# Patient Record
Sex: FEMALE | Race: WHITE | NOT HISPANIC OR LATINO | Employment: OTHER | URBAN - METROPOLITAN AREA
[De-identification: names, ages, dates, MRNs, and addresses within clinical notes are randomized per-mention and may not be internally consistent; named-entity substitution may affect disease eponyms.]

---

## 2017-02-27 DIAGNOSIS — G43.909 MIGRAINE, UNSPECIFIED, NOT INTRACTABLE, WITHOUT STATUS MIGRAINOSUS: ICD-10-CM

## 2017-02-28 NOTE — TELEPHONE ENCOUNTER
NIFEdipine      Last Written Prescription Date: 04/04/2016  Last Fill Quantity: 90, # refills: 3  Last Office Visit with G, [facility] or [facility] prescribing provider: 04/04/2016       BP Readings from Last 3 Encounters:   12/09/16 122/72   05/17/16 116/78   05/19/15 124/84

## 2017-03-01 RX ORDER — NIFEDIPINE 90 MG/1
TABLET, EXTENDED RELEASE ORAL
Qty: 90 TABLET | Refills: 0 | Status: SHIPPED | OUTPATIENT
Start: 2017-03-01 | End: 2017-03-20

## 2017-03-14 NOTE — PROGRESS NOTES
"  SUBJECTIVE:                                                    Christine Dubose is a 46 year old female who presents to clinic today for the following health issues:    Hypertension Follow-up      Outpatient blood pressures are not being checked.    Low Salt Diet: no added salt    Headaches: no    Edema: YES, by the end of the day       Depression and Anxiety Follow-Up    Status since last visit: No change    Other associated symptoms:None    Complicating factors:     Significant life event: No     Current substance abuse: None    Judaism makes her feel better    Activity: Walks    Side effects: no    Patient is flying tomorrow and would like something to help her sleep.     PHQ-9 SCORE 2/27/2015 5/17/2016   Total Score 9 -   Total Score - 5     JAYDE-7 SCORE 2/27/2015 5/17/2016   Total Score 0 -   Total Score - 0        PHQ-9  English      PHQ-9   Any Language     GAD7     Hypothyroidism Follow-up      Since last visit, patient describes the following symptoms: Weight gain- does not know if from the thyroid, no hair loss, no skin changes, no constipation, no loose stools       Amount of exercise or physical activity: walking    Problems taking medications regularly: No    Medication side effects: none    Diet: low salt    Problem list and histories reviewed & adjusted, as indicated.  Additional history: as documented    ROS:  Constitutional, HEENT, cardiovascular, pulmonary, GI, , musculoskeletal, neuro, skin, endocrine and psych systems are negative, except as otherwise noted.    This document serves as a record of the services and decisions personally performed and made by Prudencio Conde MD. It was created on his behalf by Mayelin Navarro, a trained medical scribe. The creation of this document is based the provider's statements to the medical scribe.  Mayelin Navarro    OBJECTIVE:                                                    /78  Pulse 83  Temp 98.2  F (36.8  C) (Oral)  Ht 1.689 m (5' 6.5\")  Wt 102.1 " kg (225 lb)  LMP 03/17/2014  SpO2 97%  BMI 35.77 kg/m2 Body mass index is 35.77 kg/(m^2).   GENERAL: healthy, alert, well nourished, well hydrated, no distress  EYES: Eyes grossly normal to inspection, extraocular movements - intact  RESP: lungs clear to auscultation - no rales, no rhonchi, no wheezes  CV: regular rates and rhythm, normal S1 S2, no S3 or S4 and no murmur, no click or rub -  MS: extremities- no gross deformities noted, no edema  SKIN: no suspicious lesions, no rashes  PSYCH: Alert and oriented times 3; speech- coherent , normal rate and volume; able to articulate logical thoughts, able to abstract reason, no tangential thoughts, no hallucinations or delusions, affect- normal  Diagnostic test results:  Results for orders placed or performed in visit on 03/20/17 (from the past 48 hour(s))   BASIC METABOLIC PANEL   Result Value Ref Range    Sodium 140 133 - 144 mmol/L    Potassium 3.9 3.4 - 5.3 mmol/L    Chloride 104 94 - 109 mmol/L    Carbon Dioxide 26 20 - 32 mmol/L    Anion Gap 10 3 - 14 mmol/L    Glucose 86 70 - 99 mg/dL    Urea Nitrogen 13 7 - 30 mg/dL    Creatinine 0.74 0.52 - 1.04 mg/dL    GFR Estimate 84 >60 mL/min/1.7m2    GFR Estimate If Black >90   GFR Calc   >60 mL/min/1.7m2    Calcium 9.3 8.5 - 10.1 mg/dL   Albumin Random Urine Quantitative   Result Value Ref Range    Creatinine Urine 33 mg/dL    Albumin Urine mg/L <5 mg/L    Albumin Urine mg/g Cr Unable to calculate due to low value 0 - 25 mg/g Cr          ASSESSMENT/PLAN:         Christine was seen today for recheck medication.    Diagnoses and all orders for this visit:    Hypothyroidism, unspecified type - controlled - continue medication and followed by Endocrine.    Posttraumatic stress disorder: - controlled - continue medication.  -     escitalopram (LEXAPRO) 20 MG tablet; Take 0.5-1 tablets (10-20 mg) by mouth every evening    Hypertension goal BP (blood pressure) < 140/90  -     BASIC METABOLIC PANEL  -     Albumin  "Random Urine Quantitative  -     Lipid panel reflex to direct LDL - FUTURE  S+90; Future  -     lisinopril (PRINIVIL/ZESTRIL) 5 MG tablet; Take 1 tablet (5 mg) by mouth daily    Migraine, unspecified, not intractable, without status migrainosus: - controlled - continue medication.  -     NIFEdipine ER osmotic (PROCARDIA XL) 90 MG 24 hr tablet; Take 1 tablet (90 mg) by mouth daily    Sleep disorder, circadian, jet lag type: Patient advised to start with half a dose and see if that helps her sleep.   -     diazepam (VALIUM) 5 MG tablet; Take 1 tablet (5 mg) by mouth every 6 hours as needed for anxiety or sleep    Risks, benefits and alternatives of treatments discussed. Plan agreed on.      Followup: 12 months    Will call, return to clinic, or go to ED if worsening or symptoms not improving as discussed.    See patient instructions.     BMI:   Estimated body mass index is 35.77 kg/(m^2) as calculated from the following:    Height as of this encounter: 1.689 m (5' 6.5\").    Weight as of this encounter: 102.1 kg (225 lb).   Weight management plan: Discussed healthy diet and exercise guidelines and patient will follow up in 6 months in clinic to re-evaluate.      Health Maintenance Topics with due status: Overdue       Topic Date Due    HPV Q5 YEARS (Complete with PAP) 04/08/2000    LIPID MONITORING Q1 YEAR( NO INBASKET) 02/03/2015    WELLNESS VISIT Q1 YR (NO INBASKET) 02/27/2016    BMP Q1 YR (NO INBASKET) 09/14/2016     Health Maintenance Topics with due status: Due Soon       Topic Date Due    MICROALBUMIN Q1 YEAR( NO INBASKET) 04/14/2017       Health maintenance reviewed/updated? Yes    The information in this document, created by the medical scribe for me, accurately reflects the services I personally performed and the decisions made by me. I have reviewed and approved this document for accuracy prior to leaving the patient care area.  Prudencio Conde MD March 20, 2017 7:52 AM        Sonny Conde MD   "

## 2017-03-20 ENCOUNTER — OFFICE VISIT (OUTPATIENT)
Dept: FAMILY MEDICINE | Facility: CLINIC | Age: 47
End: 2017-03-20
Payer: COMMERCIAL

## 2017-03-20 VITALS
SYSTOLIC BLOOD PRESSURE: 120 MMHG | HEIGHT: 67 IN | WEIGHT: 225 LBS | BODY MASS INDEX: 35.31 KG/M2 | HEART RATE: 83 BPM | OXYGEN SATURATION: 97 % | TEMPERATURE: 98.2 F | DIASTOLIC BLOOD PRESSURE: 78 MMHG

## 2017-03-20 DIAGNOSIS — I10 HYPERTENSION GOAL BP (BLOOD PRESSURE) < 140/90: ICD-10-CM

## 2017-03-20 DIAGNOSIS — F43.10 POSTTRAUMATIC STRESS DISORDER: ICD-10-CM

## 2017-03-20 DIAGNOSIS — G43.909 MIGRAINE, UNSPECIFIED, NOT INTRACTABLE, WITHOUT STATUS MIGRAINOSUS: ICD-10-CM

## 2017-03-20 DIAGNOSIS — G47.25 SLEEP DISORDER, CIRCADIAN, JET LAG TYPE: ICD-10-CM

## 2017-03-20 DIAGNOSIS — E03.9 HYPOTHYROIDISM, UNSPECIFIED TYPE: Primary | ICD-10-CM

## 2017-03-20 LAB
ANION GAP SERPL CALCULATED.3IONS-SCNC: 10 MMOL/L (ref 3–14)
BUN SERPL-MCNC: 13 MG/DL (ref 7–30)
CALCIUM SERPL-MCNC: 9.3 MG/DL (ref 8.5–10.1)
CHLORIDE SERPL-SCNC: 104 MMOL/L (ref 94–109)
CO2 SERPL-SCNC: 26 MMOL/L (ref 20–32)
CREAT SERPL-MCNC: 0.74 MG/DL (ref 0.52–1.04)
GFR SERPL CREATININE-BSD FRML MDRD: 84 ML/MIN/1.7M2
GLUCOSE SERPL-MCNC: 86 MG/DL (ref 70–99)
POTASSIUM SERPL-SCNC: 3.9 MMOL/L (ref 3.4–5.3)
SODIUM SERPL-SCNC: 140 MMOL/L (ref 133–144)

## 2017-03-20 PROCEDURE — 36415 COLL VENOUS BLD VENIPUNCTURE: CPT | Performed by: FAMILY MEDICINE

## 2017-03-20 PROCEDURE — 80048 BASIC METABOLIC PNL TOTAL CA: CPT | Performed by: FAMILY MEDICINE

## 2017-03-20 PROCEDURE — 82043 UR ALBUMIN QUANTITATIVE: CPT | Performed by: FAMILY MEDICINE

## 2017-03-20 PROCEDURE — 99214 OFFICE O/P EST MOD 30 MIN: CPT | Performed by: FAMILY MEDICINE

## 2017-03-20 RX ORDER — NIFEDIPINE 90 MG/1
90 TABLET, EXTENDED RELEASE ORAL DAILY
Qty: 90 TABLET | Refills: 3 | Status: SHIPPED | OUTPATIENT
Start: 2017-03-20 | End: 2018-04-30

## 2017-03-20 RX ORDER — ESCITALOPRAM OXALATE 20 MG/1
10-20 TABLET ORAL EVERY EVENING
Qty: 90 TABLET | Refills: 3 | Status: SHIPPED | OUTPATIENT
Start: 2017-03-20 | End: 2018-04-30

## 2017-03-20 RX ORDER — LEVOTHYROXINE SODIUM 125 UG/1
125 TABLET ORAL DAILY
Qty: 90 TABLET | Refills: 3 | Status: CANCELLED | OUTPATIENT
Start: 2017-03-20

## 2017-03-20 RX ORDER — LISINOPRIL 5 MG/1
5 TABLET ORAL DAILY
Qty: 90 TABLET | Refills: 3 | Status: SHIPPED | OUTPATIENT
Start: 2017-03-20 | End: 2018-04-30

## 2017-03-20 RX ORDER — DIAZEPAM 5 MG
5 TABLET ORAL EVERY 6 HOURS PRN
Qty: 2 TABLET | Refills: 0 | Status: SHIPPED | OUTPATIENT
Start: 2017-03-20 | End: 2017-06-23

## 2017-03-20 ASSESSMENT — ANXIETY QUESTIONNAIRES
6. BECOMING EASILY ANNOYED OR IRRITABLE: SEVERAL DAYS
IF YOU CHECKED OFF ANY PROBLEMS ON THIS QUESTIONNAIRE, HOW DIFFICULT HAVE THESE PROBLEMS MADE IT FOR YOU TO DO YOUR WORK, TAKE CARE OF THINGS AT HOME, OR GET ALONG WITH OTHER PEOPLE: NOT DIFFICULT AT ALL
GAD7 TOTAL SCORE: 1
7. FEELING AFRAID AS IF SOMETHING AWFUL MIGHT HAPPEN: NOT AT ALL
5. BEING SO RESTLESS THAT IT IS HARD TO SIT STILL: NOT AT ALL
1. FEELING NERVOUS, ANXIOUS, OR ON EDGE: NOT AT ALL
2. NOT BEING ABLE TO STOP OR CONTROL WORRYING: NOT AT ALL
3. WORRYING TOO MUCH ABOUT DIFFERENT THINGS: NOT AT ALL

## 2017-03-20 ASSESSMENT — PATIENT HEALTH QUESTIONNAIRE - PHQ9: 5. POOR APPETITE OR OVEREATING: NOT AT ALL

## 2017-03-20 NOTE — NURSING NOTE
"Chief Complaint   Patient presents with     Recheck Medication       Initial /78  Pulse 83  Temp 98.2  F (36.8  C) (Oral)  Ht 5' 6.5\" (1.689 m)  Wt 225 lb (102.1 kg)  LMP 03/17/2014  SpO2 97%  BMI 35.77 kg/m2 Estimated body mass index is 35.77 kg/(m^2) as calculated from the following:    Height as of this encounter: 5' 6.5\" (1.689 m).    Weight as of this encounter: 225 lb (102.1 kg).  Medication Reconciliation: complete  "

## 2017-03-20 NOTE — LETTER
[facility] PRIOR [address] [address]  [phone]        June 19, 2017    Christine Dubose  [address] [facility] [address]              Dear Christine Dubose    This is to remind you that your fasting lab work is due.    You may call our office at [phone] to schedule an appointment.    Please disregard this notice if you have already had your labs drawn or made an appointment.        Sincerely,        Prudencio Conde MD

## 2017-03-20 NOTE — MR AVS SNAPSHOT
After Visit Summary   3/20/2017    Christine Dubose    MRN: [id number]           Patient Information     Date Of Birth          1970        Visit Information        Provider Department      3/20/2017 2:40 PM Prudencio Conde MD [facility]        Today's Diagnoses     Hypothyroidism, unspecified type    -  1    Posttraumatic stress disorder        Hypertension goal BP (blood pressure) < 140/90        Migraine, unspecified, not intractable, without status migrainosus        Sleep disorder, circadian, jet lag type           Follow-ups after your visit        Your next 10 appointments already scheduled     May 30, 2017  1:15 PM CDT   MA SCREENING DIGITAL BILATERAL with WEMA1   [facility] ([facility])    [address] [place] [address], [address]  [facility] [id number] [phone]           Do not use any powder, lotion or deodorant under your arms or on your breast. If you do, we will ask you to remove it before your exam.  Wear comfortable, two-piece clothing.  If you have any allergies, tell your care team.  Bring any previous mammograms from other facilities or have them mailed to the breast center.            May 30, 2017  1:30 PM CDT   PHYSICAL with Christiana Andersen MD   [facility] ([facility])    [address] [facility]  [address]  [facility] [id number] [phone]              Future tests that were ordered for you today     Open Future Orders        Priority Expected Expires Ordered    Lipid panel reflex to direct LDL - FUTURE  S+90 Routine  6/12/2017 3/20/2017            Who to contact     If you have questions or need follow up information about today's clinic visit or your schedule please contact [facility] directly at [phone].  Normal or non-critical lab and imaging results will be communicated to you by MyChart, letter or phone within 4 business days after the clinic has  "received the results. If you do not hear from us within 7 days, please contact the clinic through [facility] or phone. If you have a critical or abnormal lab result, we will notify you by phone as soon as possible.  Submit refill requests through [facility] or call your pharmacy and they will forward the refill request to us. Please allow 3 business days for your refill to be completed.          Additional Information About Your Visit        [facility] Information     [facility] gives you secure access to your electronic health record. If you see a primary care provider, you can also send messages to your care team and make appointments. If you have questions, please call your primary care clinic.  If you do not have a primary care provider, please call [phone] and they will assist you.        Care EveryWhere ID     This is your Care EveryWhere ID. This could be used by other organizations to access your [facility] medical records  [id number]        Your Vitals Were     Pulse Temperature Height Last Period Pulse Oximetry BMI (Body Mass Index)    83 98.2  F (36.8  C) (Oral) 5' 6.5\" (1.689 m) 03/17/2014 97% 35.77 kg/m2       Blood Pressure from Last 3 Encounters:   03/20/17 120/78   12/09/16 122/72   05/17/16 116/78    Weight from Last 3 Encounters:   03/20/17 225 lb (102.1 kg)   12/09/16 222 lb (100.7 kg)   05/17/16 224 lb (101.6 kg)              We Performed the Following     Albumin Random Urine Quantitative     BASIC METABOLIC PANEL          Today's Medication Changes          These changes are accurate as of: 3/20/17  3:21 PM.  If you have any questions, ask your nurse or doctor.               Start taking these medicines.        Dose/Directions    diazepam 5 MG tablet   Commonly known as:  VALIUM   Used for:  Sleep disorder, circadian, jet lag type   Started by:  Prudencio Conde MD        Dose:  5 mg   Take 1 tablet (5 mg) by mouth every 6 hours as needed for anxiety or sleep   Quantity:  2 tablet   Refills:  0       "   These medicines have changed or have updated prescriptions.        Dose/Directions    NIFEdipine ER osmotic 90 MG 24 hr tablet   Commonly known as:  PROCARDIA XL   This may have changed:  See the new instructions.   Used for:  Migraine, unspecified, not intractable, without status migrainosus   Changed by:  Prudencio Conde MD        Dose:  90 mg   Take 1 tablet (90 mg) by mouth daily   Quantity:  90 tablet   Refills:  3            Where to get your medicines      These medications were sent to [place] [address] [place] [facility] - [facility] [address] [facility] [address]  [address] [facility] [address], [place] [address]     Phone:  [phone]     escitalopram 20 MG tablet    lisinopril 5 MG tablet    NIFEdipine ER osmotic 90 MG 24 hr tablet         Some of these will need a paper prescription and others can be bought over the counter.  Ask your nurse if you have questions.     Bring a paper prescription for each of these medications     diazepam 5 MG tablet                Primary Care Provider Office Phone # Fax #    Prudencio Conde [phone]       [address] [address]        Thank you!     Thank you for choosing [facility]  for your care. Our goal is always to provide you with excellent care. Hearing back from our patients is one way we can continue to improve our services. Please take a few minutes to complete the written survey that you may receive in the mail after your visit with us. Thank you!             Your Updated Medication List - Protect others around you: Learn how to safely use, store and throw away your medicines at www.disposemymeds.org.          This list is accurate as of: 3/20/17  3:21 PM.  Always use your most recent med list.                   Brand Name Dispense Instructions for use    Calcium-Magnesium-Zinc 333-133-5 MG Tabs      Take 1 tablet by mouth At Bedtime       diazepam 5 MG tablet    VALIUM    2 tablet    Take 1 tablet (5 mg)  by mouth every 6 hours as needed for anxiety or sleep       escitalopram 20 MG tablet    LEXAPRO    90 tablet    Take 0.5-1 tablets (10-20 mg) by mouth every evening       KRILL OIL PO      Take 1 capsule by mouth every evening       levothyroxine 125 MCG tablet    SYNTHROID/LEVOTHROID    90 tablet    Take 1 tablet (125 mcg) by mouth daily Take 1 Tablet daily       lisinopril 5 MG tablet    PRINIVIL/ZESTRIL    90 tablet    Take 1 tablet (5 mg) by mouth daily       MULTIVITAMIN GUMMIES CHILDRENS PO      Take 2 chew tab by mouth daily as needed       NIFEdipine ER osmotic 90 MG 24 hr tablet    PROCARDIA XL    90 tablet    Take 1 tablet (90 mg) by mouth daily       PATANOL 0.1 % ophthalmic solution   Generic drug:  olopatadine      Place 1 drop into both eyes daily as needed for allergies       VITAMIN B-1 PO          VITAMIN D3 PO      Take by mouth daily

## 2017-03-21 LAB
CREAT UR-MCNC: 33 MG/DL
MICROALBUMIN UR-MCNC: <5 MG/L
MICROALBUMIN/CREAT UR: NORMAL MG/G CR (ref 0–25)

## 2017-03-21 ASSESSMENT — PATIENT HEALTH QUESTIONNAIRE - PHQ9: SUM OF ALL RESPONSES TO PHQ QUESTIONS 1-9: 6

## 2017-03-21 ASSESSMENT — ANXIETY QUESTIONNAIRES: GAD7 TOTAL SCORE: 1

## 2017-05-27 DIAGNOSIS — G43.909 MIGRAINE, UNSPECIFIED, NOT INTRACTABLE, WITHOUT STATUS MIGRAINOSUS: ICD-10-CM

## 2017-05-30 ENCOUNTER — RADIANT APPOINTMENT (OUTPATIENT)
Dept: MAMMOGRAPHY | Facility: CLINIC | Age: 47
End: 2017-05-30
Payer: COMMERCIAL

## 2017-05-30 DIAGNOSIS — Z12.31 VISIT FOR SCREENING MAMMOGRAM: ICD-10-CM

## 2017-05-30 PROCEDURE — 77063 BREAST TOMOSYNTHESIS BI: CPT | Mod: TC

## 2017-05-30 PROCEDURE — G0202 SCR MAMMO BI INCL CAD: HCPCS | Mod: TC

## 2017-05-30 NOTE — TELEPHONE ENCOUNTER
NIFEdipine ER osmotic (PROCARDIA XL) 90 MG 24 hr tablet      This refill has been requested too soon.  Please decline and close.

## 2017-05-31 RX ORDER — NIFEDIPINE 90 MG/1
TABLET, EXTENDED RELEASE ORAL
Qty: 90 TABLET | Refills: 0 | OUTPATIENT
Start: 2017-05-31

## 2017-06-23 ENCOUNTER — OFFICE VISIT (OUTPATIENT)
Dept: OBGYN | Facility: CLINIC | Age: 47
End: 2017-06-23
Payer: COMMERCIAL

## 2017-06-23 VITALS
HEIGHT: 66 IN | DIASTOLIC BLOOD PRESSURE: 78 MMHG | WEIGHT: 228 LBS | SYSTOLIC BLOOD PRESSURE: 122 MMHG | BODY MASS INDEX: 36.64 KG/M2

## 2017-06-23 DIAGNOSIS — Z01.419 ENCOUNTER FOR GYNECOLOGICAL EXAMINATION WITHOUT ABNORMAL FINDING: Primary | ICD-10-CM

## 2017-06-23 DIAGNOSIS — E03.9 HYPOTHYROIDISM, UNSPECIFIED TYPE: ICD-10-CM

## 2017-06-23 PROCEDURE — 87624 HPV HI-RISK TYP POOLED RSLT: CPT | Performed by: OBSTETRICS & GYNECOLOGY

## 2017-06-23 PROCEDURE — 99396 PREV VISIT EST AGE 40-64: CPT | Performed by: OBSTETRICS & GYNECOLOGY

## 2017-06-23 PROCEDURE — G0145 SCR C/V CYTO,THINLAYER,RESCR: HCPCS | Performed by: OBSTETRICS & GYNECOLOGY

## 2017-06-23 RX ORDER — LEVOTHYROXINE SODIUM 137 UG/1
TABLET ORAL
Refills: 3 | COMMUNITY
Start: 2017-04-27 | End: 2018-04-30

## 2017-06-23 ASSESSMENT — ANXIETY QUESTIONNAIRES
IF YOU CHECKED OFF ANY PROBLEMS ON THIS QUESTIONNAIRE, HOW DIFFICULT HAVE THESE PROBLEMS MADE IT FOR YOU TO DO YOUR WORK, TAKE CARE OF THINGS AT HOME, OR GET ALONG WITH OTHER PEOPLE: NOT DIFFICULT AT ALL
6. BECOMING EASILY ANNOYED OR IRRITABLE: NOT AT ALL
2. NOT BEING ABLE TO STOP OR CONTROL WORRYING: NOT AT ALL
7. FEELING AFRAID AS IF SOMETHING AWFUL MIGHT HAPPEN: NOT AT ALL
1. FEELING NERVOUS, ANXIOUS, OR ON EDGE: NOT AT ALL
3. WORRYING TOO MUCH ABOUT DIFFERENT THINGS: NOT AT ALL
5. BEING SO RESTLESS THAT IT IS HARD TO SIT STILL: NOT AT ALL
GAD7 TOTAL SCORE: 0

## 2017-06-23 ASSESSMENT — PATIENT HEALTH QUESTIONNAIRE - PHQ9: 5. POOR APPETITE OR OVEREATING: NOT AT ALL

## 2017-06-23 NOTE — PROGRESS NOTES
Christine is a 47 year old  female who presents for annual exam.     Besides routine health maintenance, she has no other health concerns today .    HPI:  Here today for yearly exam --doing well.  S/p LASH in  with [facility].  No vb/spotting.  Has also had anterior repair with Dr. Kurtz in .  Denies hot flushes or night sweats.  +SA --no issues.  Denies dryness or pain.  Has had hemorrhoid since travel in march --denies signficant constipation.  Improving but will monitor.  No bleeding.  No bladder issues --getting up 1-2x/night but manageable at this time.  No leaking with coughing/sneezing/laughing.    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 Ayala --increased thyroid meds this spring  Hx depression/anxiety/PTSD --stable on lexapro with PCP         GYNECOLOGIC HISTORY:    Patient's last menstrual period was 2014.  Her current contraception method is: hysterectomy.  She  reports that she has never smoked. She has never used smokeless tobacco.    Patient is sexually active.  STD testing offered?  Declined  Last PHQ-9 score on record =   PHQ-9 SCORE 2017   Total Score -   Total Score 6     Last GAD7 score on record =   JAYDE-7 SCORE 2017   Total Score -   Total Score 0     Alcohol Score = 1    HEALTH MAINTENANCE:  Cholesterol:   Cholesterol   Date Value Ref Range Status   2014 202 (H) 0 - 200 mg/dL Final     Comment:     LDL Cholesterol is the primary guide to therapy.   The NCEP recommends further evaluation of: patients with cholesterol greater   than 200 mg/dL if additional risk factors are present, cholesterol greater   than   240 mg/dL, triglycerides greater than 150 mg/dL, or HDL less than 40 mg/dL.    2012 188 0 - 200 mg/dL Final     Comment:     LDL Cholesterol is the primary guide to therapy.   The NCEP recommends further evaluation of: patients with cholesterol greater   than 200 mg/dL if additional risk factors are present, cholesterol greater   than   240 mg/dL, triglycerides greater than 150 mg/dL, or HDL less than 40 mg/dL.   Last Mammo: 2017, Result: normal, Next Mammo: 2018  Pap: 2013 - WNL  Colonoscopy:  Never, Result: not applicable, Next Colonoscopy: Age 50  Dexa: Never    Health maintenance updated:  yes    HISTORY:  Obstetric History       T3      L4     SAB0   TAB0   Ectopic0   Multiple0   Live Births4       # Outcome Date GA Lbr Amol/2nd Weight Sex Delivery Anes PTL Lv   5 Term 07 37w0d  6 lb 14 oz (3.118 kg) M    SHAN      Name: Garrick   4 Term 02 37w0d  6 lb 6 oz (2.892 kg) M    SHAN      Name: Tone   3 Term 99 40w0d  8 lb 3 oz (3.714 kg) F    SHAN   2  97 35w0d   M -SEC   SHAN      Name: Giancarlo Vizcarra SAB                   Patient Active Problem List   Diagnosis     CARDIOVASCULAR SCREENING; LDL GOAL LESS THAN 160     Hypertension goal BP (blood pressure) < 140/90     Migraine     Osteoarthritis     Knee joint replacement by other means     Abnormal gait     Low back pain     Synovial cyst     History of arthroplasty of left knee     Posttraumatic stress disorder     Hypothyroidism, unspecified type     Past Surgical History:   Procedure Laterality Date     ARTHROPLASTY KNEE UNICOMPARTMENT Right 2015    Procedure: ARTHROPLASTY KNEE UNICOMPARTMENT;  Surgeon: Pablo Lara MD;  Location:  OR     ARTHROPLASTY KNEE UNICOMPARTMENT Left 10/27/2015    Procedure: ARTHROPLASTY KNEE UNICOMPARTMENT;  Surgeon: Pablo Lara MD;  Location:  OR     BACK SURGERY      CERVICAL SURGERY  (injections)      SECTION  1997    for breech     CYCTOCELE REPAIR       CYSTOSCOPY  2014    Procedure:  CYSTOSCOPY;;  Surgeon: Ad Crandall MD;  Location: [facility]     DECOMPRESSION LUMBAR ONE LEVEL Right 9/16/2015    Procedure: DECOMPRESSION LUMBAR ONE LEVEL;  Surgeon: Elio Starr MD;  Location:  OR     DILATION AND CURETTAGE, OPERATIVE HYSTEROSCOPY, COMBINED  3/8/2012    Procedure:COMBINED DILATION AND CURETTAGE, OPERATIVE HYSTEROSCOPY; OPERATIVE HYSTEROSCOPY, DILATION AND CURETTAGE, POSSIBLE RESECTION OF POLYP; Surgeon:AD CRANDALL; Location:[facility]     ENT SURGERY      TONSILLECTOMY     GENITOURINARY SURGERY      BLADDER SLING     LAPAROSCOPIC HYSTERECTOMY SUPRACERVICAL  4/9/2014    Procedure: LAPAROSCOPIC HYSTERECTOMY SUPRACERVICAL;  LAPAROSCOPIC ASSISTED SUPRACERVICAL HYSTERECTOMY, cystoscopy;  Surgeon: Ad Crandall MD;  Location: [facility]     STRIP VEIN       STRIP VEIN  11/2010     TONSILLECTOMY  1974      Social History   Substance Use Topics     Smoking status: Never Smoker     Smokeless tobacco: Never Used     Alcohol use 0.0 oz/week     0 Standard drinks or equivalent per week      Comment: social      Problem (# of Occurrences) Relation (Name,Age of Onset)    CEREBROVASCULAR DISEASE (1) Paternal Grandmother: TIA    DIABETES (4) Maternal Grandmother, Maternal Grandfather, Paternal Grandmother, Paternal Grandfather    Fractures (1) Maternal Grandmother: Hip    Hyperlipidemia (2) Mother: high triglycerides, Father: not on meds    Hypertension (4) Mother, Father, Maternal Grandmother, Paternal Grandmother    Lung Cancer (1) Maternal Grandfather (68): smoker    OSTEOPOROSIS (1) Maternal Grandmother    Stomach Cancer (1) Paternal Grandfather (68)    Thyroid Disease (1) Mother       Negative family history of: Coronary Artery Disease, Breast Cancer, Cancer - colorectal            Current Outpatient Prescriptions   Medication Sig     levothyroxine (SYNTHROID/LEVOTHROID) 137 MCG tablet TAKE ONE TABLET BY MOUTH ONCE DAILY.     escitalopram (LEXAPRO) 20 MG tablet Take  "0.5-1 tablets (10-20 mg) by mouth every evening     lisinopril (PRINIVIL/ZESTRIL) 5 MG tablet Take 1 tablet (5 mg) by mouth daily     NIFEdipine ER osmotic (PROCARDIA XL) 90 MG 24 hr tablet Take 1 tablet (90 mg) by mouth daily     Thiamine HCl (VITAMIN B-1 PO)      Pediatric Multivit-Minerals-C (MULTIVITAMIN GUMMIES CHILDRENS PO) Take 2 chew tab by mouth daily as needed     Calcium-Magnesium-Zinc 333-133-5 MG TABS Take 1 tablet by mouth At Bedtime     KRILL OIL PO Take 1 capsule by mouth every evening      PATANOL 0.1 % ophthalmic solution Place 1 drop into both eyes daily as needed for allergies      No current facility-administered medications for this visit.      Allergies   Allergen Reactions     Imitrex [Sumatriptan] Other (See Comments)     Throat tightness       Past medical, surgical, social and family histories were reviewed and updated in EPIC.    ROS:   12 point review of systems negative other than symptoms noted below.  Constitutional: Fatigue and Weight Gain  Cardiovascular: Lower Extremity Swelling  Gastrointestinal: Hemorrhoids     EXAM:  /78  Ht 5' 6\" (1.676 m)  Wt 228 lb (103.4 kg)  LMP 03/17/2014  Breastfeeding? No  BMI 36.8 kg/m2   BMI: Body mass index is 36.8 kg/(m^2).    PHYSICAL EXAM:  Constitutional:  Appearance: Well nourished, well developed, alert, in no acute distress  Neck:  Lymph Nodes:  No lymphadenopathy present    Thyroid:  Gland size normal, nontender, no nodules or masses present  on palpation  Chest:  Respiratory Effort:  Breathing unlabored  Cardiovascular:    Heart: Auscultation:  Regular rate, normal rhythm, no murmurs present  Breasts: Inspection of Breasts:  No lymphadenopathy present    Palpation of Breasts and Axillae:  No masses present on palpation, no  breast tenderness    Axillary Lymph Nodes:  No lymphadenopathy present  Gastrointestinal:   Abdominal Examination:  Abdomen nontender to palpation, tone normal without rigidity or guarding, no masses present, " umbilicus without lesions   Liver and Spleen:  No hepatomegaly present, liver nontender to palpation    Hernias:  No hernias present  Lymphatic: Lymph Nodes:  No other lymphadenopathy present  Skin:  General Inspection:  No rashes present, no lesions present, no areas of  discoloration    Genitalia and Groin:  No rashes present, no lesions present, no areas of  discoloration, no masses present  Neurologic/Psychiatric:    Mental Status:  Oriented X3     Pelvic Exam:  External Genitalia:     Normal appearance for age, no discharge present, no tenderness present, no inflammatory lesions present, color normal  Vagina:     Normal vaginal vault without central or paravaginal defects, no discharge present, no inflammatory lesions present, no masses present  Bladder:     Nontender to palpation  Urethra:   Urethral Body:  Urethra palpation normal, urethra structural support normal   Urethral Meatus:  No erythema or lesions present  Cervix:     Appearance healthy, no lesions present, nontender to palpation, no bleeding present  Uterus:     Surgically absent  Adnexa:     No adnexal tenderness present, no adnexal masses present  Perineum:     Perineum within normal limits, no evidence of trauma, no rashes or skin lesions present  Anus:     Anus within normal limits, no hemorrhoids present  Inguinal Lymph Nodes:     No lymphadenopathy present  Pubic Hair:     Normal pubic hair distribution for age  Genitalia and Groin:     No rashes present, no lesions present, no areas of discoloration, no masses present    COUNSELING:   Reviewed preventive health counseling, as reflected in patient instructions  Special attention given to:        Regular exercise       Healthy diet/nutrition       (Amanda)menopause management    BMI: Body mass index is 36.8 kg/(m^2).  Weight management plan: Discussed healthy diet and exercise guidelines and patient will follow up in 12 months in clinic to re-evaluate.    ASSESSMENT:  47 year old female with  satisfactory annual exam.    ICD-10-CM    1. Encounter for gynecological examination without abnormal finding Z01.419 Pap imaged thin layer screen with HPV - recommended age 30 - 65     HPV High Risk Types DNA Cervical   2. Hypothyroidism, unspecified type E03.9        PLAN:  Patient Instructions   Follow up with your primary care provider for your other medical problems.  Continue self breast exam.  Increase physical activity and exercise.  Lab and pap smear results will be called to the patient.  Co-testing done today and will repeat in 5yrs if negative.  Usual safety and preventative measures counseling done.  BMI >25  Weight loss encouraged.      Christiana Andersen MD

## 2017-06-23 NOTE — PATIENT INSTRUCTIONS
Follow up with your primary care provider for your other medical problems.  Continue self breast exam.  Increase physical activity and exercise.  Lab and pap smear results will be called to the patient.  Co-testing done today and will repeat in 5yrs if negative.  Usual safety and preventative measures counseling done.  BMI >25  Weight loss encouraged.

## 2017-06-23 NOTE — MR AVS SNAPSHOT
After Visit Summary   6/23/2017    Christine Dubose    MRN: [id number]           Patient Information     Date Of Birth          1970        Visit Information        Provider Department      6/23/2017 2:00 PM Christiana Andersen MD [facility]        Today's Diagnoses     Encounter for gynecological examination without abnormal finding    -  1    Hypothyroidism, unspecified type          Care Instructions    Follow up with your primary care provider for your other medical problems.  Continue self breast exam.  Increase physical activity and exercise.  Lab and pap smear results will be called to the patient.  Co-testing done today and will repeat in 5yrs if negative.  Usual safety and preventative measures counseling done.  BMI >25  Weight loss encouraged.          Follow-ups after your visit        Follow-up notes from your care team     Return in about 1 year (around 6/23/2018) for Annual Exam.      Who to contact     If you have questions or need follow up information about today's clinic visit or your schedule please contact [facility] directly at [phone].  Normal or non-critical lab and imaging results will be communicated to you by [facility] [employer]ion[employer], letter or phone within 4 business days after the clinic has received the results. If you do not hear from us within 7 days, please contact the clinic through [email or web address] or phone. If you have a critical or abnormal lab result, we will notify you by phone as soon as possible.  Submit refill requests through [email or web address] or call your pharmacy and they will forward the refill request to us. Please allow 3 business days for your refill to be completed.          Additional Information About Your Visit        [facility] [employer] Information     [email or web address] gives you secure access to your electronic health record. If you see a primary care provider, you can also send messages to your care team and make appointments. If you have questions, please  "call your primary care clinic.  If you do not have a primary care provider, please call [phone] and they will assist you.        Care EveryWhere ID     This is your Care EveryWhere ID. This could be used by other organizations to access your [place] medical records  [id number]        Your Vitals Were     Height Last Period Breastfeeding? BMI (Body Mass Index)          5' 6\" (1.676 m) 03/17/2014 No 36.8 kg/m2         Blood Pressure from Last 3 Encounters:   06/23/17 122/78   03/20/17 120/78   12/09/16 122/72    Weight from Last 3 Encounters:   06/23/17 228 lb (103.4 kg)   03/20/17 225 lb (102.1 kg)   12/09/16 222 lb (100.7 kg)              We Performed the Following     HPV High Risk Types DNA Cervical     Pap imaged thin layer screen with HPV - recommended age 30 - 65        Primary Care Provider Office Phone # Fax #    Prudencio Conde [phone]       [address] [address]        Equal Access to Services     PADMINI ALTAMIRANO : Hadii aguila ku hadasho Sokristopher, waaxda luqadaha, qaybta kaalmada mindy, stephany benavidez . So [facility] [phone].    ATENCIÓN: Si habla español, tiene a aguirre disposición servicios gratuitos de asistencia lingüística. [facility] [phone].    We comply with applicable federal civil rights laws and [place] laws. We do not discriminate on the basis of race, color, national origin, age, disability sex, sexual orientation or gender identity.            Thank you!     Thank you for choosing [facility]  for your care. Our goal is always to provide you with excellent care. Hearing back from our patients is one way we can continue to improve our services. Please take a few minutes to complete the written survey that you may receive in the mail after your visit with us. Thank you!             Your Updated Medication List - Protect others around you: Learn how to safely use, store and " throw away your medicines at www.disposemymeds.org.          This list is accurate as of: 6/23/17  2:53 PM.  Always use your most recent med list.                   Brand Name Dispense Instructions for use Diagnosis    Calcium-Magnesium-Zinc 333-133-5 MG Tabs      Take 1 tablet by mouth At Bedtime        escitalopram 20 MG tablet    LEXAPRO    90 tablet    Take 0.5-1 tablets (10-20 mg) by mouth every evening    Posttraumatic stress disorder       KRILL OIL PO      Take 1 capsule by mouth every evening        levothyroxine 137 MCG tablet    SYNTHROID/LEVOTHROID     TAKE ONE TABLET BY MOUTH ONCE DAILY.        lisinopril 5 MG tablet    PRINIVIL/ZESTRIL    90 tablet    Take 1 tablet (5 mg) by mouth daily    Hypertension goal BP (blood pressure) < 140/90       MULTIVITAMIN GUMMIES CHILDRENS PO      Take 2 chew tab by mouth daily as needed        NIFEdipine ER osmotic 90 MG 24 hr tablet    PROCARDIA XL    90 tablet    Take 1 tablet (90 mg) by mouth daily    Migraine, unspecified, not intractable, without status migrainosus       PATANOL 0.1 % ophthalmic solution   Generic drug:  olopatadine      Place 1 drop into both eyes daily as needed for allergies        VITAMIN B-1 PO

## 2017-06-24 ASSESSMENT — PATIENT HEALTH QUESTIONNAIRE - PHQ9: SUM OF ALL RESPONSES TO PHQ QUESTIONS 1-9: 6

## 2017-06-24 ASSESSMENT — ANXIETY QUESTIONNAIRES: GAD7 TOTAL SCORE: 0

## 2017-06-26 DIAGNOSIS — I10 HYPERTENSION GOAL BP (BLOOD PRESSURE) < 140/90: ICD-10-CM

## 2017-06-26 LAB
CHOLEST SERPL-MCNC: 192 MG/DL
HDLC SERPL-MCNC: 36 MG/DL
LDLC SERPL CALC-MCNC: 115 MG/DL
NONHDLC SERPL-MCNC: 156 MG/DL
TRIGL SERPL-MCNC: 207 MG/DL

## 2017-06-26 PROCEDURE — 36415 COLL VENOUS BLD VENIPUNCTURE: CPT | Performed by: FAMILY MEDICINE

## 2017-06-26 PROCEDURE — 80061 LIPID PANEL: CPT | Performed by: FAMILY MEDICINE

## 2017-06-26 NOTE — PROGRESS NOTES
Dear Candie,    Here is a summary of your recent test results:  -LDL(bad) cholesterol level is normal.  -HDL(good) cholesterol level is low and your triglycerides are elevated which can increase your heart disease risk.  A diet high in fat and simple carbohydrates, genetics and being overweight can contribute to this.   ADVISE: a regular exercise program with at least 30 minutes of aerobic exercise 3-4 days/week ( 45 minutes 4-6 days/week if weight loss needed), and omega-3 fatty acids (fish oil) 3976-4212 mg daily are helpful to improve this.  Rechecking your cholesterol in 12 months is recommended     For additional lab test information, labtestsonline.org is an excellent reference.             Thank you very much for trusting me and [facility].     Healthy regards,  Sonny Conde MD

## 2017-06-27 LAB
COPATH REPORT: NORMAL
PAP: NORMAL

## 2017-06-28 LAB
FINAL DIAGNOSIS: NORMAL
HPV HR 12 DNA CVX QL NAA+PROBE: NEGATIVE
HPV16 DNA SPEC QL NAA+PROBE: NEGATIVE
HPV18 DNA SPEC QL NAA+PROBE: NEGATIVE
SPECIMEN DESCRIPTION: NORMAL

## 2017-07-20 ENCOUNTER — COMMUNICATION - HEALTHEAST (OUTPATIENT)
Dept: PALLIATIVE MEDICINE | Facility: OTHER | Age: 47
End: 2017-07-20

## 2017-07-20 DIAGNOSIS — M47.812 CERVICAL SPONDYLOSIS: ICD-10-CM

## 2017-07-31 ENCOUNTER — TELEPHONE (OUTPATIENT)
Dept: FAMILY MEDICINE | Facility: CLINIC | Age: 47
End: 2017-07-31

## 2017-08-01 NOTE — PROGRESS NOTES
SUBJECTIVE:                                                    Christine Dubose is a 47 year old female who presents to clinic today for the following health issues:    Forms DMV - The patient has a history of one unresponsive episode in 2006. The patient had one tonic-clonic episode after a neck steroid injection over ten years ago. She was originally placed on seizure controlling medications but stopped taking them ten years ago. She had an EEG done which was normal. She needs forms completed for the DMV every four years.    Joint Pain - The patient has been experiencing right shoulder, elbow, and wrist pain. She has a history of arthritis in the knees.       Problem list and histories reviewed & adjusted, as indicated.  Additional history: as documented      ROS:  Constitutional, HEENT, cardiovascular, pulmonary, GI, , musculoskeletal, neuro, skin, endocrine and psych systems are negative, except as otherwise noted.    This document serves as a record of the services and decisions personally performed and made by Prudencio Conde MD. It was created on his behalf by Flavia Carcamo, a trained medical scribe. The creation of this document is based on the provider's statements to the medical scribe.  Flavia Carcamo 8:33 AM 8/3/2017  OBJECTIVE:                                                    LMP 03/17/2014 There is no height or weight on file to calculate BMI.   GENERAL: healthy, alert, well nourished, well hydrated, no distress  HENT: ear canals- normal; TMs- normal; Nose- normal; Mouth- no ulcers, no lesions  NECK: no tenderness, no adenopathy, no asymmetry, no masses, no stiffness; thyroid- normal to palpation  RESP: lungs clear to auscultation - no rales, no rhonchi, no wheezes  CV: regular rates and rhythm, normal S1 S2, no S3 or S4 and no murmur, no click or rub -  ABDOMEN: soft, no tenderness, no  hepatosplenomegaly, no masses, normal bowel sounds  MS: right DIP and PIP synovitis, proximal elbow pain  with forced pronation of the forearm, no pain with rotation of the shoulder, tender over the right AC joint, otherwise extremities- no gross deformities noted, no edema  SKIN: no suspicious lesions, no rashes  NEURO: strength and tone- normal, sensory exam- grossly normal, mentation- intact, speech- normal, reflexes- symmetric    Diagnostic test results:  none      ASSESSMENT/PLAN:         Christine was seen today for forms.    Diagnoses and all orders for this visit:    Unresponsive episode -no symptoms - OK to drive - DMV paperwork completed.    Arthralgia of right acromioclavicular joint - Stretching and ROM exercises. Take Aleve as needed for symptoms.  -     naproxen sodium (ANAPROX) 220 MG tablet; Take 2 tablets (440 mg) by mouth 2 times daily (with meals) for 5 days    Arthritis of right hand - Stretching and ROM exercises. Take Aleve as needed for symptoms.  -     naproxen sodium (ANAPROX) 220 MG tablet; Take 2 tablets (440 mg) by mouth 2 times daily (with meals) for 5 days        Risks, benefits and alternatives of treatments discussed. Plan agreed on.      Followup: As needed    Will call, return to clinic, or go to ED if worsening or symptoms not improving as discussed.    See patient instructions.       Health Maintenance Topics with due status: Overdue       Topic Date Due    WELLNESS VISIT Q1 YR 02/27/2016       Health maintenance reviewed/updated? Yes    The information in this document, created by a scribe for me, accurately reflects the services I personally performed and the decisions made by me. I have reviewed and approved this document for accuracy.      Sonny Conde MD

## 2017-08-03 ENCOUNTER — OFFICE VISIT (OUTPATIENT)
Dept: FAMILY MEDICINE | Facility: CLINIC | Age: 47
End: 2017-08-03
Payer: COMMERCIAL

## 2017-08-03 DIAGNOSIS — M19.041 ARTHRITIS OF RIGHT HAND: ICD-10-CM

## 2017-08-03 DIAGNOSIS — M25.511 ARTHRALGIA OF RIGHT ACROMIOCLAVICULAR JOINT: ICD-10-CM

## 2017-08-03 DIAGNOSIS — R40.4 UNRESPONSIVE EPISODE: Primary | ICD-10-CM

## 2017-08-03 PROCEDURE — 99213 OFFICE O/P EST LOW 20 MIN: CPT | Performed by: FAMILY MEDICINE

## 2017-08-03 RX ORDER — NAPROXEN SODIUM 220 MG
440 TABLET ORAL 2 TIMES DAILY WITH MEALS
Qty: 32 TABLET | Refills: 0 | COMMUNITY
Start: 2017-08-03 | End: 2017-08-08

## 2017-08-03 NOTE — MR AVS SNAPSHOT
After Visit Summary   8/3/2017    Christine Duboes    MRN: [id number]           Patient Information     Date Of Birth          1970        Visit Information        Provider Department      8/3/2017 11:20 AM Prudencio Conde MD [facility]        Today's Diagnoses     Unresponsive episode    -  1    Arthralgia of right acromioclavicular joint        Arthritis of right hand           Follow-ups after your visit        Who to contact     If you have questions or need follow up information about today's clinic visit or your schedule please contact [facility] directly at [phone].  Normal or non-critical lab and imaging results will be communicated to you by MyChart, letter or phone within 4 business days after the clinic has received the results. If you do not hear from us within 7 days, please contact the clinic through [employer] or phone. If you have a critical or abnormal lab result, we will notify you by phone as soon as possible.  Submit refill requests through [employer] or call your pharmacy and they will forward the refill request to us. Please allow 3 business days for your refill to be completed.          Additional Information About Your Visit        MyChart Information     [employer] gives you secure access to your electronic health record. If you see a primary care provider, you can also send messages to your care team and make appointments. If you have questions, please call your primary care clinic.  If you do not have a primary care provider, please call [phone] and they will assist you.        Care EveryWhere ID     This is your Care EveryWhere ID. This could be used by other organizations to access your [facility] medical records  [id number]        Your Vitals Were     Last Period                   03/17/2014            Blood Pressure from Last 3 Encounters:   06/23/17 122/78   03/20/17 120/78   12/09/16 122/72    Weight from Last 3 Encounters:    06/23/17 228 lb (103.4 kg)   03/20/17 225 lb (102.1 kg)   12/09/16 222 lb (100.7 kg)              Today, you had the following     No orders found for display         Today's Medication Changes          These changes are accurate as of: 8/3/17 11:48 AM.  If you have any questions, ask your nurse or doctor.               Start taking these medicines.        Dose/Directions    naproxen sodium 220 MG tablet   Commonly known as:  ANAPROX   Used for:  Arthritis of right hand, Arthralgia of right acromioclavicular joint   Started by:  Prudencio Conde MD        Dose:  440 mg   Take 2 tablets (440 mg) by mouth 2 times daily (with meals) for 5 days   Quantity:  32 tablet   Refills:  0            Where to get your medicines      Some of these will need a paper prescription and others can be bought over the counter.  Ask your nurse if you have questions.     You don't need a prescription for these medications     naproxen sodium 220 MG tablet                Primary Care Provider Office Phone # Fax #    Prudencio Conde [phone]       [facility] [address] [place] [address]        Equal Access to Services     [facility] AH: Hadii aad ku hadasho Soomaali, waaxda luqadaha, qaybta kaalmada adeegyada, waxay timboin haydavinn ashley benavidez ah. So [facility] [phone].    ATENCIÓN: Si habla español, tiene a aguirre disposición servicios gratuitos de asistencia lingüística. Llame al [phone].    We comply with applicable federal civil rights laws and [place] laws. We do not discriminate on the basis of race, color, national origin, age, disability sex, sexual orientation or gender identity.            Thank you!     Thank you for choosing [facility]  for your care. Our goal is always to provide you with excellent care. Hearing back from our patients is one way we can continue to improve our services. Please take a few minutes to complete the written survey that you  may receive in the mail after your visit with us. Thank you!             Your Updated Medication List - Protect others around you: Learn how to safely use, store and throw away your medicines at www.disposemymeds.org.          This list is accurate as of: 8/3/17 11:48 AM.  Always use your most recent med list.                   Brand Name Dispense Instructions for use Diagnosis    Calcium-Magnesium-Zinc 333-133-5 MG Tabs per tablet      Take 1 tablet by mouth At Bedtime        escitalopram 20 MG tablet    LEXAPRO    90 tablet    Take 0.5-1 tablets (10-20 mg) by mouth every evening    Posttraumatic stress disorder       KRILL OIL PO      Take 1 capsule by mouth every evening        levothyroxine 137 MCG tablet    SYNTHROID/LEVOTHROID     TAKE ONE TABLET BY MOUTH ONCE DAILY.        lisinopril 5 MG tablet    PRINIVIL/ZESTRIL    90 tablet    Take 1 tablet (5 mg) by mouth daily    Hypertension goal BP (blood pressure) < 140/90       MULTIVITAMIN GUMMIES CHILDRENS PO      Take 2 chew tab by mouth daily as needed        naproxen sodium 220 MG tablet    ANAPROX    32 tablet    Take 2 tablets (440 mg) by mouth 2 times daily (with meals) for 5 days    Arthritis of right hand, Arthralgia of right acromioclavicular joint       NIFEdipine ER osmotic 90 MG 24 hr tablet    PROCARDIA XL    90 tablet    Take 1 tablet (90 mg) by mouth daily    Migraine, unspecified, not intractable, without status migrainosus       PATANOL 0.1 % ophthalmic solution   Generic drug:  olopatadine      Place 1 drop into both eyes daily as needed for allergies        VITAMIN B-1 PO

## 2017-09-07 DIAGNOSIS — H10.13 ALLERGIC CONJUNCTIVITIS, BILATERAL: Primary | ICD-10-CM

## 2017-09-07 NOTE — TELEPHONE ENCOUNTER
Reason for Call:  Other - prescription    Detailed comments: Refill of patanol - patient called [facility] and they stated they have no standing orders for this.  Phone Number Patient can be reached at: Cell number on file:    Telephone Information:   Mobile [phone]       Best Time: any    Can we leave a detailed message on this number? YES    Call taken on 9/7/2017 at 10:50 AM by Kelsey Lew

## 2017-09-08 RX ORDER — OLOPATADINE HYDROCHLORIDE 1 MG/ML
1 SOLUTION/ DROPS OPHTHALMIC DAILY PRN
Qty: 1 BOTTLE | Refills: 1 | Status: SHIPPED | OUTPATIENT
Start: 2017-09-08 | End: 2018-08-21

## 2017-09-08 NOTE — TELEPHONE ENCOUNTER
Disp Refills Start End GEO   PATANOL 0.1 % ophthalmic solution  98 5/17/2015  --   Sig: Place 1 drop into both eyes daily as needed for allergies    Class: Historical - reported by patient     Need to verify current usage, dose, diagnosis for med.     Called   Telephone Information:   Mobile [phone]     Patient states that this was originally prescribed by her eye doctor but she does not see that provider anymore.   Patient takes for seasonal allergies (fall) - uses for ~1 month per year.   Directions: Take 1 drop in both eyes as needed.   Dose: 0.1%    Routing to PCP for further review/recommendations/orders.      Sharon Davila RN  [facility]

## 2017-09-14 ENCOUNTER — COMMUNICATION - HEALTHEAST (OUTPATIENT)
Dept: PALLIATIVE MEDICINE | Facility: OTHER | Age: 47
End: 2017-09-14

## 2017-09-18 ENCOUNTER — HOSPITAL ENCOUNTER (OUTPATIENT)
Dept: PALLIATIVE MEDICINE | Facility: OTHER | Age: 47
Discharge: HOME | End: 2017-09-18
Attending: PAIN MEDICINE

## 2017-09-18 DIAGNOSIS — M47.812 CERVICAL SPONDYLOSIS: ICD-10-CM

## 2017-09-18 ASSESSMENT — MIFFLIN-ST. JEOR: SCORE: 1538.94

## 2017-09-19 ENCOUNTER — COMMUNICATION - HEALTHEAST (OUTPATIENT)
Dept: PALLIATIVE MEDICINE | Facility: OTHER | Age: 47
End: 2017-09-19

## 2017-10-09 ENCOUNTER — HOSPITAL ENCOUNTER (OUTPATIENT)
Dept: PALLIATIVE MEDICINE | Facility: OTHER | Age: 47
Discharge: HOME | End: 2017-10-09
Attending: PAIN MEDICINE

## 2017-10-09 ENCOUNTER — TRANSFERRED RECORDS (OUTPATIENT)
Dept: HEALTH INFORMATION MANAGEMENT | Facility: CLINIC | Age: 47
End: 2017-10-09

## 2017-10-09 DIAGNOSIS — M79.18 MYOFASCIAL PAIN ON LEFT SIDE: ICD-10-CM

## 2017-10-09 DIAGNOSIS — M47.812 CERVICAL SPONDYLOSIS: ICD-10-CM

## 2017-10-09 ASSESSMENT — MIFFLIN-ST. JEOR: SCORE: 1538.94

## 2017-10-10 ENCOUNTER — COMMUNICATION - HEALTHEAST (OUTPATIENT)
Dept: PALLIATIVE MEDICINE | Facility: OTHER | Age: 47
End: 2017-10-10

## 2017-11-06 ENCOUNTER — COMMUNICATION - HEALTHEAST (OUTPATIENT)
Dept: PALLIATIVE MEDICINE | Facility: OTHER | Age: 47
End: 2017-11-06

## 2017-11-07 ENCOUNTER — HOSPITAL ENCOUNTER (OUTPATIENT)
Dept: PALLIATIVE MEDICINE | Facility: OTHER | Age: 47
Discharge: HOME | End: 2017-11-07
Attending: PAIN MEDICINE

## 2017-11-07 DIAGNOSIS — M47.812 CERVICAL SPONDYLOSIS WITHOUT MYELOPATHY: ICD-10-CM

## 2017-11-07 ASSESSMENT — MIFFLIN-ST. JEOR: SCORE: 1538.94

## 2017-11-08 ENCOUNTER — COMMUNICATION - HEALTHEAST (OUTPATIENT)
Dept: PALLIATIVE MEDICINE | Facility: OTHER | Age: 47
End: 2017-11-08

## 2018-04-30 ENCOUNTER — OFFICE VISIT (OUTPATIENT)
Dept: FAMILY MEDICINE | Facility: CLINIC | Age: 48
End: 2018-04-30
Payer: COMMERCIAL

## 2018-04-30 VITALS
BODY MASS INDEX: 37.93 KG/M2 | DIASTOLIC BLOOD PRESSURE: 76 MMHG | WEIGHT: 236 LBS | TEMPERATURE: 97 F | RESPIRATION RATE: 12 BRPM | HEART RATE: 80 BPM | HEIGHT: 66 IN | OXYGEN SATURATION: 99 % | SYSTOLIC BLOOD PRESSURE: 124 MMHG

## 2018-04-30 DIAGNOSIS — F43.10 POSTTRAUMATIC STRESS DISORDER: ICD-10-CM

## 2018-04-30 DIAGNOSIS — E03.9 HYPOTHYROIDISM, UNSPECIFIED TYPE: ICD-10-CM

## 2018-04-30 DIAGNOSIS — I10 HYPERTENSION GOAL BP (BLOOD PRESSURE) < 140/90: Primary | ICD-10-CM

## 2018-04-30 DIAGNOSIS — G43.909 MIGRAINE WITHOUT STATUS MIGRAINOSUS, NOT INTRACTABLE, UNSPECIFIED MIGRAINE TYPE: ICD-10-CM

## 2018-04-30 DIAGNOSIS — E66.01 MORBID OBESITY (H): ICD-10-CM

## 2018-04-30 LAB
ALBUMIN SERPL-MCNC: 3.6 G/DL (ref 3.4–5)
ALP SERPL-CCNC: 73 U/L (ref 40–150)
ALT SERPL W P-5'-P-CCNC: 25 U/L (ref 0–50)
ANION GAP SERPL CALCULATED.3IONS-SCNC: 7 MMOL/L (ref 3–14)
AST SERPL W P-5'-P-CCNC: 13 U/L (ref 0–45)
BILIRUB SERPL-MCNC: 0.6 MG/DL (ref 0.2–1.3)
BUN SERPL-MCNC: 14 MG/DL (ref 7–30)
CALCIUM SERPL-MCNC: 9.7 MG/DL (ref 8.5–10.1)
CHLORIDE SERPL-SCNC: 105 MMOL/L (ref 94–109)
CHOLEST SERPL-MCNC: 211 MG/DL
CO2 SERPL-SCNC: 25 MMOL/L (ref 20–32)
CREAT SERPL-MCNC: 0.75 MG/DL (ref 0.52–1.04)
CREAT UR-MCNC: 60 MG/DL
GFR SERPL CREATININE-BSD FRML MDRD: 83 ML/MIN/1.7M2
GLUCOSE SERPL-MCNC: 92 MG/DL (ref 70–99)
HDLC SERPL-MCNC: 37 MG/DL
LDLC SERPL CALC-MCNC: 145 MG/DL
MICROALBUMIN UR-MCNC: 5 MG/L
MICROALBUMIN/CREAT UR: 9.04 MG/G CR (ref 0–25)
NONHDLC SERPL-MCNC: 174 MG/DL
POTASSIUM SERPL-SCNC: 4 MMOL/L (ref 3.4–5.3)
PROT SERPL-MCNC: 7.4 G/DL (ref 6.8–8.8)
SODIUM SERPL-SCNC: 137 MMOL/L (ref 133–144)
T3FREE SERPL-MCNC: 2.4 PG/ML (ref 2.3–4.2)
T4 FREE SERPL-MCNC: 1.24 NG/DL (ref 0.76–1.46)
TRIGL SERPL-MCNC: 146 MG/DL
TSH SERPL DL<=0.005 MIU/L-ACNC: 2.08 MU/L (ref 0.4–4)

## 2018-04-30 PROCEDURE — 84481 FREE ASSAY (FT-3): CPT | Performed by: FAMILY MEDICINE

## 2018-04-30 PROCEDURE — 80053 COMPREHEN METABOLIC PANEL: CPT | Performed by: FAMILY MEDICINE

## 2018-04-30 PROCEDURE — 84443 ASSAY THYROID STIM HORMONE: CPT | Performed by: FAMILY MEDICINE

## 2018-04-30 PROCEDURE — 99214 OFFICE O/P EST MOD 30 MIN: CPT | Performed by: FAMILY MEDICINE

## 2018-04-30 PROCEDURE — 80061 LIPID PANEL: CPT | Performed by: FAMILY MEDICINE

## 2018-04-30 PROCEDURE — 36415 COLL VENOUS BLD VENIPUNCTURE: CPT | Performed by: FAMILY MEDICINE

## 2018-04-30 PROCEDURE — 82043 UR ALBUMIN QUANTITATIVE: CPT | Performed by: FAMILY MEDICINE

## 2018-04-30 PROCEDURE — 84439 ASSAY OF FREE THYROXINE: CPT | Performed by: FAMILY MEDICINE

## 2018-04-30 RX ORDER — LEVOTHYROXINE SODIUM 137 UG/1
137 TABLET ORAL DAILY
Qty: 90 TABLET | Refills: 3 | Status: SHIPPED | OUTPATIENT
Start: 2018-04-30 | End: 2019-03-28

## 2018-04-30 RX ORDER — LISINOPRIL 10 MG/1
10 TABLET ORAL DAILY
Qty: 90 TABLET | Refills: 3 | Status: SHIPPED | OUTPATIENT
Start: 2018-04-30 | End: 2019-05-03

## 2018-04-30 RX ORDER — NIFEDIPINE 60 MG/1
60 TABLET, EXTENDED RELEASE ORAL DAILY
Qty: 90 TABLET | Refills: 3 | Status: SHIPPED | OUTPATIENT
Start: 2018-04-30 | End: 2019-05-03

## 2018-04-30 RX ORDER — ESCITALOPRAM OXALATE 20 MG/1
10-20 TABLET ORAL EVERY EVENING
Qty: 90 TABLET | Refills: 3 | Status: SHIPPED | OUTPATIENT
Start: 2018-04-30 | End: 2018-04-30

## 2018-04-30 ASSESSMENT — ANXIETY QUESTIONNAIRES
3. WORRYING TOO MUCH ABOUT DIFFERENT THINGS: NOT AT ALL
6. BECOMING EASILY ANNOYED OR IRRITABLE: NOT AT ALL
GAD7 TOTAL SCORE: 0
7. FEELING AFRAID AS IF SOMETHING AWFUL MIGHT HAPPEN: NOT AT ALL
IF YOU CHECKED OFF ANY PROBLEMS ON THIS QUESTIONNAIRE, HOW DIFFICULT HAVE THESE PROBLEMS MADE IT FOR YOU TO DO YOUR WORK, TAKE CARE OF THINGS AT HOME, OR GET ALONG WITH OTHER PEOPLE: NOT DIFFICULT AT ALL
2. NOT BEING ABLE TO STOP OR CONTROL WORRYING: NOT AT ALL
5. BEING SO RESTLESS THAT IT IS HARD TO SIT STILL: NOT AT ALL
1. FEELING NERVOUS, ANXIOUS, OR ON EDGE: NOT AT ALL

## 2018-04-30 ASSESSMENT — PATIENT HEALTH QUESTIONNAIRE - PHQ9: 5. POOR APPETITE OR OVEREATING: NOT AT ALL
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
## 2018-04-30 NOTE — PROGRESS NOTES
Dear Candie,    Here is a summary of your recent test results:  -Liver and gallbladder tests are normal. (ALT,AST, Alk phos, bilirubin), kidney function is normal (Cr, GFR), Sodium is normal, Potassium is normal, Calcium is normal, Glucose is normal (diabetes screening test).   -LDL(bad) cholesterol level is elevated,  A diet high in fat and simple carbohydrates, genetics and being overweight can contribute to this. ADVISE: Exercise, a low fat, low carbohydrate diet and weight control are helpful to improve this.  Rechecking your fasting cholesterol panel in 12 months is recommended (Lipid w/ LDL reflex, DX:hyperlipidemia)  -TSH (thyroid stimulating hormone) level is normal which indicates appropriate thyroid replacement dosing.  ADVISE: continuing same replacement dose and recheck in 12 months (TSH w/ T4 reflex, DX: hypothyroidism.)  -Microalbumin (urine protein) test is normal.  ADVISE: recheck annually    For additional lab test information, labtestsonline.org is an excellent reference.    Thank you very much for trusting me and [facility].     Healthy regards,  Sonny Conde MD

## 2018-04-30 NOTE — NURSING NOTE
"Chief Complaint   Patient presents with     Thyroid Problem       Initial /76 (BP Location: Right arm, Patient Position: Sitting, Cuff Size: Adult Large)  Pulse 80  Temp 97  F (36.1  C) (Tympanic)  Resp 12  Ht 5' 6\" (1.676 m)  Wt 236 lb (107 kg)  LMP 03/17/2014  SpO2 99%  Breastfeeding? No  BMI 38.09 kg/m2 Estimated body mass index is 38.09 kg/(m^2) as calculated from the following:    Height as of this encounter: 5' 6\" (1.676 m).    Weight as of this encounter: 236 lb (107 kg).  Medication Reconciliation: complete   Silvia Bloedow LPN    "

## 2018-04-30 NOTE — MR AVS SNAPSHOT
After Visit Summary   4/30/2018    Christine Dubose    MRN: [id number]           Patient Information     Date Of Birth          1970        Visit Information        Provider Department      4/30/2018 9:40 AM Prudencio Conde MD [facility]        Today's Diagnoses     Hypertension goal BP (blood pressure) < 140/90    -  1    Hypothyroidism, unspecified type        Posttraumatic stress disorder        Migraine without status migrainosus, not intractable, unspecified migraine type        Morbid obesity (H)           Follow-ups after your visit        Follow-up notes from your care team     Return in about 2 months (around 6/30/2018) for recheck.      Your next 10 appointments already scheduled     Jul 02, 2018 11:00 AM CDT   Office Visit with Prudencio Conde MD   [facility]    [address] [place] [id number] [phone]           Bring a current list of meds and any records pertaining to this visit. For Physicals, please bring immunization records and any forms needing to be filled out. Please arrive 10 minutes early to complete paperwork.            Jul 13, 2018  1:15 PM CDT   (Arrive by 1:00 PM)   MA SCREENING DIGITAL BILATERAL with [id number]   [facility] [place] ([facility])    [address], [address]  [facility] [id number] [phone]           Do not use any powder, lotion or deodorant under your arms or on your breast. If you do, we will ask you to remove it before your exam.  Wear comfortable, two-piece clothing.  If you have any allergies, tell your care team.  Bring any previous mammograms from other facilities or have them mailed to the breast center. Three-dimensional (3D) mammograms are available at [place] locations in [facility], [place], [place], [facility], [place], [place], and [place]. [facility] locations include [place] and [facility] in [place]. Benefits of 3D mammograms include: - Improved rate of cancer detection - Decreases your chance of having to go back for more tests, which means fewer: - \"False-positive\" results (This means that there is an abnormal area but it isn't cancer.) - Invasive testing procedures, such as a biopsy or surgery - Can provide clearer images of the breast if you have dense breast tissue. 3D mammography is an optional exam that anyone can have with a 2D mammogram. It doesn't replace or take the place of a 2D mammogram. 2D mammograms remain an effective screening test for all women.  Not all insurance companies cover the cost of a 3D mammogram. Check with your insurance.            Jul 13, 2018  1:30 PM CDT   PHYSICAL with Christiana Andersen MD   [facility] ([facility])    [address] [place] [id number] [phone]              Who to contact     If you have questions or need follow up information about today's clinic visit or your schedule please contact [facility] directly at [phone].  Normal or non-critical lab and imaging results will be communicated to you by [employer], letter or phone within 4 business days after the clinic has received the results. If you do not hear from us within 7 days, please contact the clinic through [employer] or phone. If you have a critical or abnormal lab result, we will notify you by phone as soon as possible.  Submit refill requests through [employer] or call your pharmacy and they will forward the refill request to us. Please allow 3 business days for your refill to be completed.          Additional Information About Your Visit        [employer] Information     [employer] gives you secure access to your electronic health record. If you see a primary care provider, you can also send messages to your care team and make appointments. If you have questions, please call your primary care " "clinic.  If you do not have a primary care provider, please call [phone] and they will assist you.        Care EveryWhere ID     This is your Care EveryWhere ID. This could be used by other organizations to access your [facility] medical records  [id number]        Your Vitals Were     Pulse Temperature Respirations Height Last Period Pulse Oximetry    80 97  F (36.1  C) (Tympanic) 12 5' 6\" (1.676 m) 03/17/2014 99%    Breastfeeding? BMI (Body Mass Index)                No 38.09 kg/m2           Blood Pressure from Last 3 Encounters:   04/30/18 124/76   06/23/17 122/78   03/20/17 120/78    Weight from Last 3 Encounters:   04/30/18 236 lb (107 kg)   06/23/17 228 lb (103.4 kg)   03/20/17 225 lb (102.1 kg)              We Performed the Following     Albumin Random Urine Quantitative with Creat Ratio     Comprehensive metabolic panel (BMP + Alb, Alk Phos, ALT, AST, Total. Bili, TP)     Lipid panel reflex to direct LDL Fasting     T3, Free     T4 free     TSH          Today's Medication Changes          These changes are accurate as of 4/30/18 11:10 AM.  If you have any questions, ask your nurse or doctor.               These medicines have changed or have updated prescriptions.        Dose/Directions    levothyroxine 137 MCG tablet   Commonly known as:  SYNTHROID/LEVOTHROID   This may have changed:  See the new instructions.   Used for:  Hypothyroidism, unspecified type   Changed by:  Prudencio Conde MD        Dose:  137 mcg   Take 1 tablet (137 mcg) by mouth daily   Quantity:  90 tablet   Refills:  3       lisinopril 10 MG tablet   Commonly known as:  PRINIVIL/ZESTRIL   This may have changed:    - medication strength  - how much to take   Used for:  Hypertension goal BP (blood pressure) < 140/90   Changed by:  Prudencio Conde MD        Dose:  10 mg   Take 1 tablet (10 mg) by mouth daily   Quantity:  90 tablet   Refills:  3       NIFEdipine ER osmotic 60 MG Tb24   Commonly known as:  PROCARDIA XL   This may have " changed:    - medication strength  - how much to take   Used for:  Migraine without status migrainosus, not intractable, unspecified migraine type   Changed by:  Prudencio Conde MD        Dose:  60 mg   Take 1 tablet (60 mg) by mouth daily   Quantity:  90 tablet   Refills:  3         Stop taking these medicines if you haven't already. Please contact your care team if you have questions.     escitalopram 20 MG tablet   Commonly known as:  LEXAPRO   Stopped by:  Prudencio Conde MD                Where to get your medicines      These medications were sent to [facility] [id number] [address] AT [facility] [address], [facility] [address]    Hours:  24-hours Phone:  [phone]     levothyroxine 137 MCG tablet    lisinopril 10 MG tablet    NIFEdipine ER osmotic 60 MG Tb24                Primary Care Provider Office Phone # Fax #    Prudencio Conde [phone]       [address] [place] [address]        Equal Access to Services     [facility] AH: Hadii aad ku hadasho Soomaali, waaxda luqadaha, qaybta kaalmada adeegyada, waxay idiin hayaan ashley benavidez . So [facility] [phone].    ATENCIÓN: Si habla español, tiene a aguirre disposición servicios gratuitos de asistencia lingüística. Ll[facility] [phone].    We comply with applicable federal civil rights laws and [place] laws. We do not discriminate on the basis of race, color, national origin, age, disability, sex, sexual orientation, or gender identity.            Thank you!     Thank you for choosing [facility]  for your care. Our goal is always to provide you with excellent care. Hearing back from our patients is one way we can continue to improve our services. Please take a few minutes to complete the written survey that you may receive in the mail after your visit with us. Thank you!             Your Updated Medication List - Protect others around you:  Learn how to safely use, store and throw away your medicines at www.disposemymeds.org.          This list is accurate as of 4/30/18 11:10 AM.  Always use your most recent med list.                   Brand Name Dispense Instructions for use Diagnosis    Calcium-Magnesium-Zinc 333-133-5 MG Tabs per tablet      Take 1 tablet by mouth At Bedtime        KRILL OIL PO      Take 1 capsule by mouth every evening        levothyroxine 137 MCG tablet    SYNTHROID/LEVOTHROID    90 tablet    Take 1 tablet (137 mcg) by mouth daily    Hypothyroidism, unspecified type       lisinopril 10 MG tablet    PRINIVIL/ZESTRIL    90 tablet    Take 1 tablet (10 mg) by mouth daily    Hypertension goal BP (blood pressure) < 140/90       MULTIVITAMIN GUMMIES CHILDRENS PO      Take 2 chew tab by mouth daily as needed        NIFEdipine ER osmotic 60 MG Tb24    PROCARDIA XL    90 tablet    Take 1 tablet (60 mg) by mouth daily    Migraine without status migrainosus, not intractable, unspecified migraine type       olopatadine 0.1 % ophthalmic solution    PATANOL    1 Bottle    Place 1 drop into both eyes daily as needed for allergies    Allergic conjunctivitis, bilateral       VITAMIN B-1 PO

## 2018-05-01 ASSESSMENT — PATIENT HEALTH QUESTIONNAIRE - PHQ9: SUM OF ALL RESPONSES TO PHQ QUESTIONS 1-9: 5

## 2018-05-01 ASSESSMENT — ANXIETY QUESTIONNAIRES: GAD7 TOTAL SCORE: 0

## 2018-06-01 ENCOUNTER — OFFICE VISIT (OUTPATIENT)
Dept: OBGYN | Facility: CLINIC | Age: 48
End: 2018-06-01
Payer: COMMERCIAL

## 2018-06-01 VITALS — WEIGHT: 237 LBS | DIASTOLIC BLOOD PRESSURE: 80 MMHG | BODY MASS INDEX: 38.25 KG/M2 | SYSTOLIC BLOOD PRESSURE: 132 MMHG

## 2018-06-01 DIAGNOSIS — N92.6 IRREGULAR BLEEDING: Primary | ICD-10-CM

## 2018-06-01 PROCEDURE — 99213 OFFICE O/P EST LOW 20 MIN: CPT | Performed by: OBSTETRICS & GYNECOLOGY

## 2018-06-01 NOTE — MR AVS SNAPSHOT
"              After Visit Summary   6/1/2018    Christine Dubose    MRN: [id number]           Patient Information     Date Of Birth          1970        Visit Information        Provider Department      6/1/2018 10:00 AM Christiana Andersen MD [facility]        Today's Diagnoses     Irregular bleeding    -  1      Care Instructions    Reassurance given with normal exam today.  No cervical abnormalities or mesh exposure.  Will continue to monitor over the next month prior to her yearly exam.          Follow-ups after your visit        Follow-up notes from your care team     Return in about 4 weeks (around 6/29/2018) for Annual Exam.      Your next 10 appointments already scheduled     Jul 13, 2018  1:15 PM CDT   MA SCREENING DIGITAL BILATERAL with WEMA1   [facility] ([facility])    [address] [place] [address], [address]  [facility] [address] [phone]           Do not use any powder, lotion or deodorant under your arms or on your breast. If you do, we will ask you to remove it before your exam.  Wear comfortable, two-piece clothing.  If you have any allergies, tell your care team.  Bring any previous mammograms from other facilities or have them mailed to the breast center. Three-dimensional (3D) mammograms are available at [place] locations in [facility], [facility], [facility], and [place]. [facility] locations include [facility] in [place]. Benefits of 3D mammograms include: - Improved rate of cancer detection - Decreases your chance of having to go back for more tests, which means fewer: - \"False-positive\" results (This means that there is an abnormal area but it isn't cancer.) - Invasive testing procedures, such as a biopsy or surgery - Can provide clearer images of the breast if you have dense breast tissue. 3D mammography is an optional exam that anyone can have with a 2D " mammogram. It doesn't replace or take the place of a 2D mammogram. 2D mammograms remain an effective screening test for all women.  Not all insurance companies cover the cost of a 3D mammogram. Check with your insurance.            Jul 13, 2018  1:30 PM CDT   PHYSICAL with Christiana Andersen MD   [facility] ([facility])    [address] [place] [id number] [phone]            Jul 23, 2018 11:00 AM CDT   Office Visit with Prudencio Conde MD   [facility] ([facility])    [address] [place] [id number] [phone]           Bring a current list of meds and any records pertaining to this visit. For Physicals, please bring immunization records and any forms needing to be filled out. Please arrive 10 minutes early to complete paperwork.              Who to contact     If you have questions or need follow up information about today's clinic visit or your schedule please contact [facility] directly at [phone].  Normal or non-critical lab and imaging results will be communicated to you by Vericanhart, letter or phone within 4 business days after the clinic has received the results. If you do not hear from us within 7 days, please contact the clinic through 2degreesmobilet or phone. If you have a critical or abnormal lab result, we will notify you by phone as soon as possible.  Submit refill requests through [employer] or call your pharmacy and they will forward the refill request to us. Please allow 3 business days for your refill to be completed.          Additional Information About Your Visit        [employer] Information     [employer] gives you secure access to your electronic health record. If you see a primary care provider, you can also send messages to your care team and make appointments. If you have questions, please call your primary care clinic.  If you do not have a primary  care provider, please call [phone] and they will assist you.        Care EveryWhere ID     This is your Care EveryWhere ID. This could be used by other organizations to access your [place] medical records  [id number]        Your Vitals Were     Last Period Breastfeeding? BMI (Body Mass Index)             03/17/2014 No 38.25 kg/m2          Blood Pressure from Last 3 Encounters:   06/01/18 132/80   04/30/18 124/76   06/23/17 122/78    Weight from Last 3 Encounters:   06/01/18 237 lb (107.5 kg)   04/30/18 236 lb (107 kg)   06/23/17 228 lb (103.4 kg)              Today, you had the following     No orders found for display       Primary Care Provider Office Phone # Fax #    Prudencio Conde [phone] [address] [facility] [address]        Equal Access to Services     [facility]: Hadii aguila munroe hadasho Sokristopher, waaxda luqadaha, qaybta kaalmada adeegyada, stephany benavidez . So [facility] [phone].    ATENCIÓN: Si habla español, tiene a aguirre disposición servicios gratuitos de asistencia lingüística. Llame al [phone].    We comply with applicable federal civil rights laws and [place] laws. We do not discriminate on the basis of race, color, national origin, age, disability, sex, sexual orientation, or gender identity.            Thank you!     Thank you for choosing [facility]  for your care. Our goal is always to provide you with excellent care. Hearing back from our patients is one way we can continue to improve our services. Please take a few minutes to complete the written survey that you may receive in the mail after your visit with us. Thank you!             Your Updated Medication List - Protect others around you: Learn how to safely use, store and throw away your medicines at www.disposemymeds.org.          This list is accurate as of 6/1/18 12:34 PM.  Always use your most recent med list.                   Brand Name  Dispense Instructions for use Diagnosis    Calcium-Magnesium-Zinc 333-133-5 MG Tabs per tablet      Take 1 tablet by mouth At Bedtime        KRILL OIL PO      Take 1 capsule by mouth every evening        levothyroxine 137 MCG tablet    SYNTHROID/LEVOTHROID    90 tablet    Take 1 tablet (137 mcg) by mouth daily    Hypothyroidism, unspecified type       lisinopril 10 MG tablet    PRINIVIL/ZESTRIL    90 tablet    Take 1 tablet (10 mg) by mouth daily    Hypertension goal BP (blood pressure) < 140/90       MULTIVITAMIN GUMMIES CHILDRENS PO      Take 2 chew tab by mouth daily as needed        NIFEdipine ER osmotic 60 MG Tb24    PROCARDIA XL    90 tablet    Take 1 tablet (60 mg) by mouth daily    Migraine without status migrainosus, not intractable, unspecified migraine type       olopatadine 0.1 % ophthalmic solution    PATANOL    1 Bottle    Place 1 drop into both eyes daily as needed for allergies    Allergic conjunctivitis, bilateral       VITAMIN B-1 PO

## 2018-06-01 NOTE — PATIENT INSTRUCTIONS
Reassurance given with normal exam today.  No cervical abnormalities or mesh exposure.  Will continue to monitor over the next month prior to her yearly exam.

## 2018-06-01 NOTE — PROGRESS NOTES
SUBJECTIVE:                                                   Christine Dubose is a 48 year old female who presents to clinic today for the following health issue(s):  Patient presents with:  Vaginal Problem: spotting started yesterday, BRB-pink, this is the 3rd episode, had 2014.       HPI:  Here today for vaginal spotting.  Has had 3 episodes this year --first two were dark brown/maroon spotting for 1-2d.  No cramping or pain.  Otherwise feeling well.  Had repeat episode this week --this time was pink/light red spotting.  Nothing requiring pad or tampon --mainly just spots on her underwear and with wiping.  No cramping/pain.  No urinary symptoms.  Bowels moving well.  +SA but very infrequently; has not be SA recently  Otherwise healthy  Stressed lately with daughter's graduation and upcoming party    Patient's last menstrual period was 2014..   Patient is sexually active, .  Using hysterectomy for contraception.    reports that she has never smoked. She has never used smokeless tobacco.    STD testing offered?  Declined    Health maintenance updated:  yes    Today's PHQ-2 Score:   PHQ-2 (  [employer]) 2018   Q1: Little interest or pleasure in doing things 1   Q2: Feeling down, depressed or hopeless 1   PHQ-2 Score 2     Today's PHQ-9 Score:   PHQ-9 SCORE 2018   Total Score -   Total Score 5     Today's JAYDE-7 Score:   JAYDE-7 SCORE 2018   Total Score -   Total Score 0       Problem list and histories reviewed & adjusted, as indicated.  Additional history: as documented.    Patient Active Problem List   Diagnosis     CARDIOVASCULAR SCREENING; LDL GOAL LESS THAN 160     Hypertension goal BP (blood pressure) < 140/90     Migraine     Osteoarthritis     Knee joint replacement by other means     Abnormal gait     Low back pain     Synovial cyst     History of arthroplasty of left knee     Posttraumatic stress disorder     Hypothyroidism, unspecified type     Arthritis of right hand      Arthralgia of right acromioclavicular joint     Morbid obesity (H)     Past Surgical History:   Procedure Laterality Date     ARTHROPLASTY KNEE UNICOMPARTMENT Right 2015    Procedure: ARTHROPLASTY KNEE UNICOMPARTMENT;  Surgeon: Pablo Lara MD;  Location:  OR     ARTHROPLASTY KNEE UNICOMPARTMENT Left 10/27/2015    Procedure: ARTHROPLASTY KNEE UNICOMPARTMENT;  Surgeon: Pablo Lara MD;  Location:  OR     BACK SURGERY      CERVICAL SURGERY  (injections)      SECTION  1997    for breech     CYCTOCELE REPAIR       CYSTOSCOPY  2014    Procedure: CYSTOSCOPY;;  Surgeon: Ad Crandall MD;  Location: [facility]     DECOMPRESSION LUMBAR ONE LEVEL Right 2015    Procedure: DECOMPRESSION LUMBAR ONE LEVEL;  Surgeon: Elio Starr MD;  Location: [facility]     DILATION AND CURETTAGE, OPERATIVE HYSTEROSCOPY, COMBINED  3/8/2012    Procedure:COMBINED DILATION AND CURETTAGE, OPERATIVE HYSTEROSCOPY; OPERATIVE HYSTEROSCOPY, DILATION AND CURETTAGE, POSSIBLE RESECTION OF POLYP; Surgeon:AD CRANDALL; Location:[facility]     ENT SURGERY      TONSILLECTOMY     GENITOURINARY SURGERY      BLADDER SLING     LAPAROSCOPIC HYSTERECTOMY SUPRACERVICAL  2014    Procedure: LAPAROSCOPIC HYSTERECTOMY SUPRACERVICAL;  LAPAROSCOPIC ASSISTED SUPRACERVICAL HYSTERECTOMY, cystoscopy;  Surgeon: Ad Crandall MD;  Location: [facility]     STRIP VEIN       STRIP VEIN  2010     TONSILLECTOMY  1974      Social History   Substance Use Topics     Smoking status: Never Smoker     Smokeless tobacco: Never Used     Alcohol use 0.0 oz/week     0 Standard drinks or equivalent per week      Comment: social      Problem (# of Occurrences) Relation (Name,Age of Onset)    CEREBROVASCULAR DISEASE (1) Paternal Grandmother: TIA    DIABETES (4) Maternal Grandmother, Maternal Grandfather, Paternal Grandmother, Paternal Grandfather    Fractures (1) Maternal Grandmother: Hip    Hyperlipidemia (2) Mother: high  triglycerides, Father: not on meds    Hypertension (4) Mother, Father, Maternal Grandmother, Paternal Grandmother    Lung Cancer (1) Maternal Grandfather (68): smoker    OSTEOPOROSIS (1) Maternal Grandmother    Stomach Cancer (1) Paternal Grandfather (68)    Thyroid Disease (1) Mother       Negative family history of: Coronary Artery Disease, Breast Cancer, Cancer - colorectal            Current Outpatient Prescriptions   Medication Sig     Calcium-Magnesium-Zinc 333-133-5 MG TABS Take 1 tablet by mouth At Bedtime     KRILL OIL PO Take 1 capsule by mouth every evening      levothyroxine (SYNTHROID/LEVOTHROID) 137 MCG tablet Take 1 tablet (137 mcg) by mouth daily     lisinopril (PRINIVIL/ZESTRIL) 10 MG tablet Take 1 tablet (10 mg) by mouth daily     NIFEdipine ER osmotic (PROCARDIA XL) 60 MG TB24 Take 1 tablet (60 mg) by mouth daily     olopatadine (PATANOL) 0.1 % ophthalmic solution Place 1 drop into both eyes daily as needed for allergies     Pediatric Multivit-Minerals-C (MULTIVITAMIN GUMMIES CHILDRENS PO) Take 2 chew tab by mouth daily as needed     Thiamine HCl (VITAMIN B-1 PO)      No current facility-administered medications for this visit.      Allergies   Allergen Reactions     Imitrex [Sumatriptan] Other (See Comments)     Throat tightness       ROS:  Constitutional: Fatigue and Weight Gain  Genitourinary: Spotting  Musculoskeletal: Joint Pain and Muscle Cramps    OBJECTIVE:     /80  Wt 237 lb (107.5 kg)  LMP 03/17/2014  Breastfeeding? No  BMI 38.25 kg/m2  Body mass index is 38.25 kg/(m^2).    Exam:  Constitutional:  Appearance: Well nourished, well developed alert, in no acute distress  Gastrointestinal:  Abdominal Examination:  Abdomen nontender to palpation, tone normal without rigidity or guarding, no masses present, umbilicus without lesions; Liver/Spleen:  No hepatomegaly present, liver nontender to palpation; Hernias:  No hernias present  Skin:General Inspection:  No rashes present, no  lesions present, no areas of discoloration; Genitalia and Groin:  No rashes present, no lesions present, no areas of discoloration, no masses present.  Neurologic/Psychiatric:  Mental Status:  Oriented X3   Pelvic Exam:  External Genitalia:     Normal appearance for age, no discharge present, no tenderness present, no inflammatory lesions present, color normal  Vagina:     Normal vaginal vault without central or paravaginal defects, no discharge present, no inflammatory lesions present, no masses present; OLD BLOOD IN VAULT; NO ACTIVE BLEEDING; MESH PALPABLE BUT NO EXPOSURE APPRECIATED  Bladder:     Nontender to palpation  Urethra:   Urethral Body:  Urethra palpation normal, urethra structural support normal   Urethral Meatus:  No erythema or lesions present  Cervix:     Appearance healthy, no lesions present, nontender to palpation, no bleeding present; NORMAL; NO POLYP  Uterus:     Surgically absent  Adnexa:     No adnexal tenderness present, no adnexal masses present  Perineum:     Perineum within normal limits, no evidence of trauma, no rashes or skin lesions present  Anus:     Anus within normal limits, no hemorrhoids present  Inguinal Lymph Nodes:     No lymphadenopathy present  Pubic Hair:     Normal pubic hair distribution for age  Genitalia and Groin:     No rashes present, no lesions present, no areas of discoloration, no masses present       In-Clinic Test Results:  No results found for this or any previous visit (from the past 24 hour(s)).    ASSESSMENT/PLAN:                                                        ICD-10-CM    1. Irregular bleeding N92.6        Patient Instructions   Reassurance given with normal exam today.  No cervical abnormalities or mesh exposure.  Will continue to monitor over the next month prior to her yearly exam.      Christiana Andersen MD  [facility] [place]

## 2018-07-13 ENCOUNTER — OFFICE VISIT (OUTPATIENT)
Dept: OBGYN | Facility: CLINIC | Age: 48
End: 2018-07-13
Payer: COMMERCIAL

## 2018-07-13 ENCOUNTER — RADIANT APPOINTMENT (OUTPATIENT)
Dept: MAMMOGRAPHY | Facility: CLINIC | Age: 48
End: 2018-07-13
Payer: COMMERCIAL

## 2018-07-13 VITALS
HEART RATE: 84 BPM | HEIGHT: 67 IN | WEIGHT: 238 LBS | SYSTOLIC BLOOD PRESSURE: 124 MMHG | DIASTOLIC BLOOD PRESSURE: 78 MMHG | BODY MASS INDEX: 37.35 KG/M2

## 2018-07-13 DIAGNOSIS — Z12.31 VISIT FOR SCREENING MAMMOGRAM: ICD-10-CM

## 2018-07-13 DIAGNOSIS — E66.01 MORBID OBESITY (H): ICD-10-CM

## 2018-07-13 DIAGNOSIS — Z01.419 ENCOUNTER FOR GYNECOLOGICAL EXAMINATION WITHOUT ABNORMAL FINDING: Primary | ICD-10-CM

## 2018-07-13 PROCEDURE — 99396 PREV VISIT EST AGE 40-64: CPT | Performed by: OBSTETRICS & GYNECOLOGY

## 2018-07-13 PROCEDURE — 77063 BREAST TOMOSYNTHESIS BI: CPT | Mod: TC

## 2018-07-13 PROCEDURE — 77067 SCR MAMMO BI INCL CAD: CPT | Mod: TC

## 2018-07-13 RX ORDER — ESCITALOPRAM OXALATE 20 MG/1
TABLET ORAL
Refills: 3 | COMMUNITY
Start: 2018-07-02 | End: 2018-08-21

## 2018-07-13 ASSESSMENT — ANXIETY QUESTIONNAIRES
IF YOU CHECKED OFF ANY PROBLEMS ON THIS QUESTIONNAIRE, HOW DIFFICULT HAVE THESE PROBLEMS MADE IT FOR YOU TO DO YOUR WORK, TAKE CARE OF THINGS AT HOME, OR GET ALONG WITH OTHER PEOPLE: NOT DIFFICULT AT ALL
5. BEING SO RESTLESS THAT IT IS HARD TO SIT STILL: NOT AT ALL
6. BECOMING EASILY ANNOYED OR IRRITABLE: NOT AT ALL
GAD7 TOTAL SCORE: 0
7. FEELING AFRAID AS IF SOMETHING AWFUL MIGHT HAPPEN: NOT AT ALL
3. WORRYING TOO MUCH ABOUT DIFFERENT THINGS: NOT AT ALL
2. NOT BEING ABLE TO STOP OR CONTROL WORRYING: NOT AT ALL
1. FEELING NERVOUS, ANXIOUS, OR ON EDGE: NOT AT ALL

## 2018-07-13 ASSESSMENT — PATIENT HEALTH QUESTIONNAIRE - PHQ9: 5. POOR APPETITE OR OVEREATING: NOT AT ALL

## 2018-07-13 NOTE — MR AVS SNAPSHOT
After Visit Summary   7/13/2018    Christine Dubose    MRN: [id number]           Patient Information     Date Of Birth          1970        Visit Information        Provider Department      7/13/2018 1:30 PM Christiana Andersen MD [facility]        Today's Diagnoses     Encounter for gynecological examination without abnormal finding    -  1    Morbid obesity (H)          Care Instructions    Follow up with your primary care provider for your other medical problems.  Continue self breast exam.  Increase physical activity and exercise.  Usual safety and preventative measures counseling done.  BMI >25  Weight loss encouraged.  Last pap smear (2017) was normal and negative for the DNA of high risk HPV subtypes.  No pap was obtained this year.  This was discussed with the patient and she agrees with the plan.          Follow-ups after your visit        Follow-up notes from your care team     Return in about 1 year (around 7/13/2019) for Annual Exam.      Your next 10 appointments already scheduled     Jul 23, 2018 11:00 AM CDT   Office Visit with Prudencio Conde MD   [facility]    [address] [place] [id number] [phone]           Bring a current list of meds and any records pertaining to this visit. For Physicals, please bring immunization records and any forms needing to be filled out. Please arrive 10 minutes early to complete paperwork.              Who to contact     If you have questions or need follow up information about today's clinic visit or your schedule please contact [facility] directly at [phone].  Normal or non-critical lab and imaging results will be communicated to you by MyChart, letter or phone within 4 business days after the clinic has received the results. If you do not hear from us within 7 days, please contact the clinic through MyChart or phone. If you  "have a critical or abnormal lab result, we will notify you by phone as soon as possible.  Submit refill requests through K[employer]cher or call your pharmacy and they will forward the refill request to us. Please allow 3 business days for your refill to be completed.          Additional Information About Your Visit        MyChart Information     K[employer]cher gives you secure access to your electronic health record. If you see a primary care provider, you can also send messages to your care team and make appointments. If you have questions, please call your primary care clinic.  If you do not have a primary care provider, please call [phone] and they will assist you.        Care EveryWhere ID     This is your Care EveryWhere ID. This could be used by other organizations to access your [place] medical records  [id number]        Your Vitals Were     Pulse Height Last Period BMI (Body Mass Index)          84 5' 7.25\" (1.708 m) 03/17/2014 37 kg/m2         Blood Pressure from Last 3 Encounters:   07/13/18 124/78   06/01/18 132/80   04/30/18 124/76    Weight from Last 3 Encounters:   07/13/18 238 lb (108 kg)   06/01/18 237 lb (107.5 kg)   04/30/18 236 lb (107 kg)              Today, you had the following     No orders found for display       Primary Care Provider Office Phone # Fax #    Prudencio Conde [phone]       [address] [place] [address]        Equal Access to Services     [facility]: Hadii aad ku hadasho Soomaali, waaxda luqadaha, qaybta kaalmada adeegyada, stephany limon. So [facility] [phone].    ATENCIÓN: Si habla español, tiene a aguirre disposición servicios gratuitos de asistencia lingüística. Llame al [phone].    We comply with applicable federal civil rights laws and [place] laws. We do not discriminate on the basis of race, color, national origin, age, disability, sex, sexual orientation, or gender identity.            Thank you!     Thank you " for choosing [facility] [place]  for your care. Our goal is always to provide you with excellent care. Hearing back from our patients is one way we can continue to improve our services. Please take a few minutes to complete the written survey that you may receive in the mail after your visit with us. Thank you!             Your Updated Medication List - Protect others around you: Learn how to safely use, store and throw away your medicines at www.disposemymeds.org.          This list is accurate as of 7/13/18  2:19 PM.  Always use your most recent med list.                   Brand Name Dispense Instructions for use Diagnosis    Calcium-Magnesium-Zinc 333-133-5 MG Tabs per tablet      Take 1 tablet by mouth At Bedtime        escitalopram 20 MG tablet    LEXAPRO          KRILL OIL PO      Take 1 capsule by mouth every evening        levothyroxine 137 MCG tablet    SYNTHROID/LEVOTHROID    90 tablet    Take 1 tablet (137 mcg) by mouth daily    Hypothyroidism, unspecified type       lisinopril 10 MG tablet    PRINIVIL/ZESTRIL    90 tablet    Take 1 tablet (10 mg) by mouth daily    Hypertension goal BP (blood pressure) < 140/90       MULTIVITAMIN GUMMIES CHILDRENS PO      Take 2 chew tab by mouth daily as needed        NIFEdipine ER osmotic 60 MG Tb24    PROCARDIA XL    90 tablet    Take 1 tablet (60 mg) by mouth daily    Migraine without status migrainosus, not intractable, unspecified migraine type       olopatadine 0.1 % ophthalmic solution    PATANOL    1 Bottle    Place 1 drop into both eyes daily as needed for allergies    Allergic conjunctivitis, bilateral       VITAMIN B-1 PO

## 2018-07-13 NOTE — PATIENT INSTRUCTIONS
Follow up with your primary care provider for your other medical problems.  Continue self breast exam.  Increase physical activity and exercise.  Usual safety and preventative measures counseling done.  BMI >25  Weight loss encouraged.  Last pap smear (2017) was normal and negative for the DNA of high risk HPV subtypes.  No pap was obtained this year.  This was discussed with the patient and she agrees with the plan.

## 2018-07-13 NOTE — PROGRESS NOTES
Christine is a 48 year old  female who presents for annual exam.     Besides routine health maintenance, she has no other health concerns today .    HPI:  Here today for yearly exam --doing well.  S/p LASH in  with Dr. Crandall.  Had one episode of spotting in early  --nothing since that time.  Denies hot flushes or night sweats.  +SA --no issues.  Deneis dryness or pain.  S/p AP repair and sling in  with Dr. Kurtz.  Denies leaking of urine.  No bowel issues --continues to be regular.    ; 4 children --youngest daughter graduated from  this year and is heading to small college in [place] this fall.  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 Ayala for thyroid  -questions about some joint issues --hip pain, hand joints, etc --mother has had some rheum issues      GYNECOLOGIC HISTORY:    Patient's last menstrual period was 2014.  Her current contraception method is: hysterectomy.  She  reports that she has never smoked. She has never used smokeless tobacco.    Patient is sexually active.  STD testing offered?  Declined  Last PHQ-9 score on record =   PHQ-9 SCORE 2018   Total Score -   Total Score 5     Last GAD7 score on record =   JAYDE-7 SCORE 2018   Total Score -   Total Score 0     Alcohol Score = 0    HEALTH MAINTENANCE:  Cholesterol: 18   Total= 211, Triglycerides=146, HDL=37, SLN=726, FBS=92, TSH=2.08    Cholesterol   Date Value Ref Range Status   2018 211 (H) <200 mg/dL Final     Comment:     Desirable:       <200 mg/dl   2017 192 <200 mg/dL Final      Last Mammo: one year ago, Result: normal, Next Mammo: today   Pap:   Lab Results   Component Value Date    PAP NIL HPV- 2017      Colonoscopy:   never, Result: not applicable, Next Colonoscopy: age 50.  Dexa:  never    Health maintenance updated:  yes    HISTORY:  Obstetric History       T3      L4     SAB1   TAB0   Ectopic0   Multiple0   Live Births4       # Outcome Date GA Lbr Amol/2nd Weight Sex Delivery Anes PTL Lv   5 Term 07 37w0d  6 lb 14 oz (3.118 kg) M    SHAN      Name: Garrick   4 Term 02 37w0d  6 lb 6 oz (2.892 kg) M    SHAN      Name: Tone   3 Term 99 40w0d  8 lb 3 oz (3.714 kg) F    SHAN   2  97 35w0d   M -SEC   SAHN      Name: Giancarlo   1 SAB                   Patient Active Problem List   Diagnosis     CARDIOVASCULAR SCREENING; LDL GOAL LESS THAN 160     Hypertension goal BP (blood pressure) < 140/90     Migraine     Osteoarthritis     Knee joint replacement by other means     Abnormal gait     Low back pain     Synovial cyst     History of arthroplasty of left knee     Posttraumatic stress disorder     Hypothyroidism, unspecified type     Arthritis of right hand     Arthralgia of right acromioclavicular joint     Morbid obesity (H)     Past Surgical History:   Procedure Laterality Date     ARTHROPLASTY KNEE UNICOMPARTMENT Right 2015    Procedure: ARTHROPLASTY KNEE UNICOMPARTMENT;  Surgeon: Pablo Lara MD;  Location:  OR     ARTHROPLASTY KNEE UNICOMPARTMENT Left 10/27/2015    Procedure: ARTHROPLASTY KNEE UNICOMPARTMENT;  Surgeon: Pablo Lara MD;  Location:  OR     BACK SURGERY      CERVICAL SURGERY  (injections)      SECTION  1997    for breech     CYCTOCELE REPAIR       CYSTOSCOPY  2014    Procedure: CYSTOSCOPY;;  Surgeon: Candie Crandall MD;  Location:  [facility]     DECOMPRESSION LUMBAR ONE LEVEL Right 2015    Procedure: DECOMPRESSION LUMBAR ONE LEVEL;  Surgeon: Elio Starr MD;  Location:  OR     DILATION AND CURETTAGE, OPERATIVE HYSTEROSCOPY, COMBINED  3/8/2012    Procedure:COMBINED DILATION AND CURETTAGE, OPERATIVE  HYSTEROSCOPY; OPERATIVE HYSTEROSCOPY, DILATION AND CURETTAGE, POSSIBLE RESECTION OF POLYP; Surgeon:AD CRANDALL; Location:[facility]     ENT SURGERY      TONSILLECTOMY     GENITOURINARY SURGERY      BLADDER SLING     LAPAROSCOPIC HYSTERECTOMY SUPRACERVICAL  4/9/2014    Procedure: LAPAROSCOPIC HYSTERECTOMY SUPRACERVICAL;  LAPAROSCOPIC ASSISTED SUPRACERVICAL HYSTERECTOMY, cystoscopy;  Surgeon: Ad Crandall MD;  Location: [facility]     STRIP VEIN       STRIP VEIN  11/2010     TONSILLECTOMY  1974      Social History   Substance Use Topics     Smoking status: Never Smoker     Smokeless tobacco: Never Used     Alcohol use 0.0 oz/week     0 Standard drinks or equivalent per week      Comment: social      Problem (# of Occurrences) Relation (Name,Age of Onset)    Cerebrovascular Disease (1) Paternal Grandmother: TIA    Diabetes (4) Maternal Grandmother, Maternal Grandfather, Paternal Grandmother, Paternal Grandfather    Fractures (1) Maternal Grandmother: Hip    Hyperlipidemia (2) Mother: high triglycerides, Father: not on meds    Hypertension (4) Mother, Father, Maternal Grandmother, Paternal Grandmother    Lung Cancer (1) Maternal Grandfather (68): smoker    Osteoperosis (1) Maternal Grandmother    Stomach Cancer (1) Paternal Grandfather (68)    Thyroid Disease (1) Mother       Negative family history of: Coronary Artery Disease, Breast Cancer, Cancer - colorectal            Current Outpatient Prescriptions   Medication Sig     Calcium-Magnesium-Zinc 333-133-5 MG TABS Take 1 tablet by mouth At Bedtime     escitalopram (LEXAPRO) 20 MG tablet      KRILL OIL PO Take 1 capsule by mouth every evening      levothyroxine (SYNTHROID/LEVOTHROID) 137 MCG tablet Take 1 tablet (137 mcg) by mouth daily     lisinopril (PRINIVIL/ZESTRIL) 10 MG tablet Take 1 tablet (10 mg) by mouth daily     NIFEdipine ER osmotic (PROCARDIA XL) 60 MG TB24 Take 1 tablet (60 mg) by mouth daily     olopatadine (PATANOL) 0.1 % ophthalmic  "solution Place 1 drop into both eyes daily as needed for allergies     Pediatric Multivit-Minerals-C (MULTIVITAMIN GUMMIES CHILDRENS PO) Take 2 chew tab by mouth daily as needed     Thiamine HCl (VITAMIN B-1 PO)      No current facility-administered medications for this visit.      Allergies   Allergen Reactions     Imitrex [Sumatriptan] Other (See Comments)     Throat tightness       Past medical, surgical, social and family histories were reviewed and updated in EPIC.    ROS:   12 point review of systems negative other than symptoms noted below.  Constitutional: Fatigue and Weight Gain  Cardiovascular: Lower Extremity Swelling  Neurologic: Headaches  Psychiatric: Depression    EXAM:  /78  Pulse 84  Ht 5' 7.25\" (1.708 m)  Wt 238 lb (108 kg)  LMP 03/17/2014  BMI 37 kg/m2   BMI: Body mass index is 37 kg/(m^2).    PHYSICAL EXAM:  Constitutional:  Appearance: Well nourished, well developed, alert, in no acute distress  Neck:  Lymph Nodes:  No lymphadenopathy present    Thyroid:  Gland size normal, nontender, no nodules or masses present  on palpation  Chest:  Respiratory Effort:  Breathing unlabored  Cardiovascular:    Heart: Auscultation:  Regular rate, normal rhythm, no murmurs present  Breasts: Inspection of Breasts:  No lymphadenopathy present., Palpation of Breasts and Axillae:  No masses present on palpation, no breast tenderness., Axillary Lymph Nodes:  No lymphadenopathy present. and No nodularity, asymmetry or nipple discharge bilaterally.  Gastrointestinal:   Abdominal Examination:  Abdomen nontender to palpation, tone normal without rigidity or guarding, no masses present, umbilicus without lesions   Liver and Spleen:  No hepatomegaly present, liver nontender to palpation    Hernias:  No hernias present  Lymphatic: Lymph Nodes:  No other lymphadenopathy present  Skin:  General Inspection:  No rashes present, no lesions present, no areas of  discoloration    Genitalia and Groin:  No rashes present, " no lesions present, no areas of  discoloration, no masses present  Neurologic/Psychiatric:    Mental Status:  Oriented X3     Pelvic Exam:  External Genitalia:     Normal appearance for age, no discharge present, no tenderness present, no inflammatory lesions present, color normal  Vagina:     Normal vaginal vault without central or paravaginal defects, no discharge present, no inflammatory lesions present, no masses present  Bladder:     Nontender to palpation  Urethra:   Urethral Body:  Urethra palpation normal, urethra structural support normal   Urethral Meatus:  No erythema or lesions present  Cervix:     Appearance healthy, no lesions present, nontender to palpation, no bleeding present  Uterus:     Surgically absent  Adnexa:     No adnexal tenderness present, no adnexal masses present  Perineum:     Perineum within normal limits, no evidence of trauma, no rashes or skin lesions present  Anus:     Anus within normal limits, no hemorrhoids present  Inguinal Lymph Nodes:     No lymphadenopathy present  Pubic Hair:     Normal pubic hair distribution for age  Genitalia and Groin:     No rashes present, no lesions present, no areas of discoloration, no masses present      COUNSELING:   Reviewed preventive health counseling, as reflected in patient instructions  Special attention given to:        Regular exercise       Healthy diet/nutrition       (Amanda)menopause management    BMI: Body mass index is 37 kg/(m^2).  Weight management plan: Discussed healthy diet and exercise guidelines and patient will follow up in 12 months in clinic to re-evaluate. Patient was referred to their PCP to discuss a diet and exercise plan.    ASSESSMENT:  48 year old female with satisfactory annual exam.    ICD-10-CM    1. Encounter for gynecological examination without abnormal finding Z01.419    2. Morbid obesity (H) E66.01        PLAN:  Patient Instructions   Follow up with your primary care provider for your other medical  problems.  Continue self breast exam.  Increase physical activity and exercise.  Usual safety and preventative measures counseling done.  BMI >25  Weight loss encouraged.  Last pap smear (2017) was normal and negative for the DNA of high risk HPV subtypes.  No pap was obtained this year.  This was discussed with the patient and she agrees with the plan.      Christiana Andersen MD

## 2018-07-14 ASSESSMENT — ANXIETY QUESTIONNAIRES: GAD7 TOTAL SCORE: 0

## 2018-07-14 ASSESSMENT — PATIENT HEALTH QUESTIONNAIRE - PHQ9: SUM OF ALL RESPONSES TO PHQ QUESTIONS 1-9: 5

## 2018-08-21 ENCOUNTER — OFFICE VISIT (OUTPATIENT)
Dept: FAMILY MEDICINE | Facility: CLINIC | Age: 48
End: 2018-08-21
Payer: COMMERCIAL

## 2018-08-21 VITALS
SYSTOLIC BLOOD PRESSURE: 122 MMHG | DIASTOLIC BLOOD PRESSURE: 80 MMHG | WEIGHT: 238 LBS | TEMPERATURE: 97.3 F | HEART RATE: 82 BPM | HEIGHT: 67 IN | OXYGEN SATURATION: 97 % | BODY MASS INDEX: 37.35 KG/M2

## 2018-08-21 DIAGNOSIS — H10.13 ALLERGIC CONJUNCTIVITIS, BILATERAL: ICD-10-CM

## 2018-08-21 DIAGNOSIS — F41.9 ANXIETY: ICD-10-CM

## 2018-08-21 DIAGNOSIS — M70.61 TROCHANTERIC BURSITIS OF RIGHT HIP: Primary | ICD-10-CM

## 2018-08-21 PROCEDURE — 99214 OFFICE O/P EST MOD 30 MIN: CPT | Performed by: FAMILY MEDICINE

## 2018-08-21 RX ORDER — ESCITALOPRAM OXALATE 20 MG/1
20 TABLET ORAL DAILY
Qty: 90 TABLET | Refills: 1 | Status: SHIPPED | OUTPATIENT
Start: 2018-08-21 | End: 2019-05-03

## 2018-08-21 RX ORDER — NAPROXEN SODIUM 220 MG
440 TABLET ORAL 2 TIMES DAILY WITH MEALS
Qty: 180 TABLET | Refills: 3 | COMMUNITY
Start: 2018-08-21 | End: 2018-08-28

## 2018-08-21 RX ORDER — OLOPATADINE HYDROCHLORIDE 1 MG/ML
1 SOLUTION/ DROPS OPHTHALMIC DAILY PRN
Qty: 1 BOTTLE | Refills: 1 | Status: SHIPPED | OUTPATIENT
Start: 2018-08-21 | End: 2019-05-03

## 2018-08-21 NOTE — PROGRESS NOTES
"  SUBJECTIVE:                                                      Christine Dubose is a 48 year old female who presents to clinic today for the following health issues:    Hypertension Follow-up    Outpatient blood pressures are not being checked.    Low Salt Diet: no added salt    - Pt is doing well on lisinopril & nifedipine, with BP measuring 122/80 today in clinic & no side effect complaints.       Depression and Anxiety Follow-Up    Status since last visit: No change    Other associated symptoms:None    Complicating factors:     Significant life event: No     Current substance abuse: None    PHQ-9  English  PHQ-9 6/23/2017 4/30/2018 7/13/2018   Total Score 6 5 5   Q9: Suicide Ideation Not at all Not at all Not at all     JAYDE-7  JAYDE-7 SCORE 6/23/2017 4/30/2018 7/13/2018   Total Score - - -   Total Score 0 0 0   Suicide Assessment Five-step Evaluation and Treatment (SAFE-T)    - Pt is doing well on Lexapro, reports stable mood & no side effect complaints today.       Hypothyroidism Follow-up    Since last visit, patient describes the following symptoms: Weight stable, no hair loss, no skin changes, no constipation, no loose stools    - Pt is doing well on levothyroxine, no concerns today.      Right Hip Pain -- persisting x 2 months; She reports a \"burning\" pain on the lateral side of her right hip, intermittently radiates down into her leg & knee. The pain is exacerbated with position changes [standing up from chair or pivoting to turn] & lying on her right side to sleep.         Problem list and histories reviewed & adjusted, as indicated.  Additional history: as documented    ROS:  Constitutional, HEENT, cardiovascular, pulmonary, GI, , musculoskeletal, neuro, skin, endocrine and psych systems are negative, except as otherwise noted.      This document serves as a record of the services and decisions personally performed and made by Prudencio Conde MD. It was created on his behalf by Christiana Jaffe, a trained " "medical scribe. The creation of this document is based the provider's statements to the medical scribe.  Steven Jaffe 3:59 PM, August 21, 2018    OBJECTIVE:                                                      /80  Pulse 82  Temp 97.3  F (36.3  C) (Oral)  Ht 1.708 m (5' 7.25\")  Wt 108 kg (238 lb)  LMP 03/17/2014  SpO2 97%  BMI 37 kg/m2 Body mass index is 37 kg/(m^2).   GENERAL: healthy, alert, well nourished, well hydrated, no distress  HENT: ear canals- normal; TMs- normal; Nose- normal; Mouth- no ulcers, no lesions  NECK: no tenderness, no adenopathy, no asymmetry, no masses, no stiffness; thyroid- normal to palpation  RESP: lungs clear to auscultation - no rales, no rhonchi, no wheezes  CV: regular rates and rhythm, normal S1 S2, no S3 or S4 and no murmur, no click or rub -  ABDOMEN: soft, no tenderness, no  hepatosplenomegaly, no masses, normal bowel sounds  MS: Right hip - mild tenderness over greater trochanter; otherwise extremities- no gross deformities noted, no edema  SKIN: no suspicious lesions, no rashes  PSYCH: Alert and oriented times 3; speech- coherent , normal rate and volume; able to articulate logical thoughts, able to abstract reason, no tangential thoughts, no hallucinations or delusions, affect- normal    Diagnostic test results:  None  ASSESSMENT/PLAN:                                                      Christine was seen today for recheck medication and musculoskeletal problem.    Diagnoses and all orders for this visit:    Trochanteric bursitis of right hip - Reviewed stretches & exercises [information given], advised to use ice & OTC naproxen as needed to alleviate pain  -     naproxen sodium (ALEVE) 220 MG tablet; Take 2 tablets (440 mg) by mouth 2 times daily (with meals) for 7 days    Anxiety - Controlled, continue medication  -     escitalopram (LEXAPRO) 20 MG tablet; Take 1 tablet (20 mg) by mouth daily    Allergic conjunctivitis, bilateral - Controlled, continue " "medication  -     olopatadine (PATANOL) 0.1 % ophthalmic solution; Place 1 drop into both eyes daily as needed for allergies        Risks, benefits and alternatives of treatments discussed. Plan agreed on.      Followup: Return in about 6 months (around 2/21/2019) for Med Check.    See patient instructions.       BMI:   Estimated body mass index is 37 kg/(m^2) as calculated from the following:    Height as of this encounter: 1.708 m (5' 7.25\").    Weight as of this encounter: 108 kg (238 lb).   Weight management plan: Discussed healthy diet and exercise guidelines and patient will follow up in 12 months in clinic to re-evaluate.        The information in this document, created by the medical scribe for me, accurately reflects the services I personally performed and the decisions made by me. I have reviewed and approved this document for accuracy prior to leaving the patient care area.  3:59 PM, 08/21/18        Sonny Conde MD   Pager: [phone]    "

## 2018-08-21 NOTE — MR AVS SNAPSHOT
After Visit Summary   8/21/2018    Christine Dubose    MRN: [id number]           Patient Information     Date Of Birth          1970        Visit Information        Provider Department      8/21/2018 3:20 PM Prudencio Conde MD [facility]        Today's Diagnoses     Trochanteric bursitis of right hip    -  1    Anxiety        Allergic conjunctivitis, bilateral          Care Instructions                    Trochanteric Bursitis            What is trochanteric bursitis?   Trochanteric bursitis is irritation or inflammation of the trochanteric bursa. A bursa is a fluid-filled sac that acts as a cushion between tendons, bones, and skin. The trochanteric bursa is located on the upper, outer area of the thigh. There is a bump on the outer side of the upper part of the thigh bone (femur) called the greater trochanter. The trochanteric bursa is located over the greater trochanter.   How does it occur?   The trochanteric bursa may be inflamed by a group of muscles or tendons rubbing over the bursa and causing friction against the thigh bone. This injury can occur with running, walking, or bicycling, especially when the bicycle seat is too high.   What are the symptoms?   You have pain on the upper outer area of your thigh or in your hip. The pain is worse when you walk, bicycle, or go up or down stairs. You have pain when you move your thigh bone and feel tenderness in the area over the greater trochanter.   How is it diagnosed?   Your healthcare provider will ask about your symptoms and examine your hip and thigh.   How is it treated?   To treat this condition:   Put an ice pack, gel pack, or package of frozen vegetables, wrapped in a cloth on the area every 3 to 4 hours, for up to 20 minutes at a time.   Take an anti-inflammatory medicine such as ibuprofen, or other medicine as directed by your provider. Nonsteroidal anti-inflammatory medicines (NSAIDs) may cause stomach bleeding and  other problems. These risks increase with age. Read the label and take as directed. Unless recommended by your healthcare provider, do not take for more than 10 days.   Your provider may give you an injection of a corticosteroid medicine.   While you are recovering from your injury you will need to change your sport or activity to one that does not make your condition worse. For example, you may need to swim instead of running or bicycling. If you are bicycling, you may need to lower your bicycle seat.   How long do the effects last?   The length of recovery depends on many factors such as your age, health, and if you have had a previous injury. Recovery time also depends on the severity of the injury. A bursa that is only mildly inflamed and has just started to hurt may improve within a few weeks. A bursa that is significantly inflamed and has been painful for a long time may take up to a few months to improve. You need to stop doing the activities that cause pain until your bursa has healed. If you continue doing activities that cause pain, your symptoms will return and it will take longer to recover.   When can I return to my normal activities?   Everyone recovers from an injury at a different rate. Return to your activities depends on how soon your leg recovers, not by how many days or weeks it has been since your injury has occurred. In general, the longer you have symptoms before you start treatment, the longer it will take to get better. The goal of rehabilitation is to return to your normal activities as soon as is safely possible. If you return too soon you may worsen your injury.   You may safely return to your normal activities when, starting from the top of the list and progressing to the end, each of the following is true:   You have full range of motion in the injured leg compared to the uninjured leg.   You have full strength of the injured leg compared to the uninjured leg.   You can walk straight  ahead without pain or limping.   How can I prevent trochanteric bursitis?   Trochanteric bursitis is best prevented by warming up properly and stretching the muscles on the outer side of your upper thigh.     Published by [employer].  This content is reviewed periodically and is subject to change as new health information becomes available. The information is intended to inform and educate and is not a replacement for medical evaluation, advice, diagnosis or treatment by a healthcare professional.   Written by Jaylen Vivas MD, for [facility].   ? 2010 [facility] and/or its affiliates. All Rights Reserved.   Copyright   Clinical Reference Systems 2011         Hip Bursitis Exercises   You can do the first 3 stretches to begin stretching the muscles that run along the outside of your hip. You can do the strengthening exercises when the sharp pain lessens.  Stretching exercises  Gluteal stretch: Lie on your back with both knees bent. Rest the ankle of your injured leg over the knee of your other leg. Grasp the thigh of the leg on the uninjured side and pull toward your chest. You will feel a stretch along the buttocks on the injured side and possibly along the outside of your hip. Hold the stretch for 15 to 30 seconds. Repeat 3 times.   Iliotibial band stretch, standing: Cross your uninjured leg in front of the other leg and bend down and reach toward the inside of your back foot. Do not bend your knees. Hold this position for 15 to 30 seconds. Return to the starting position. Repeat 3 times.   Iliotibial band stretch, side-leaning: Stand sideways near a wall with your injured side closest to the wall. Place a hand on the wall for support. Cross the leg farther from the wall over the other leg. Keep the foot closest to the wall flat on the floor. Lean your hips into the wall. Hold the stretch for 15 to 30 seconds. Repeat 3 times   Strengthening exercises  Straight leg raise: Lie on your back with your legs  straight out in front of you. Bend the knee on your uninjured side and place the foot flat on the floor. Tighten the thigh muscle on your injured side and lift your leg about 8 inches off the floor. Keep your leg straight and your thigh muscle tight. Slowly lower your leg back down to the floor. Do 2 sets of 15.   Prone hip extension: Lie on your stomach with your legs straight out behind you. Draw your belly button in towards your spine and tighten your abdominal muscles. Tighten the buttocks and thigh muscles of the leg on your injured side and lift the leg off the floor about 8 inches. Keep your leg straight. Hold for 5 seconds. Then lower your leg and relax. Do 2 sets of 15.   Side-lying leg lift: Lie on your uninjured side. Tighten the front thigh muscles on your injured leg and lift that leg 8 to 10 inches away from the other leg. Keep the leg straight and lower it slowly. Do 2 sets of 15.   Wall squat with a ball: Stand with your back, shoulders, and head against a wall. Look straight ahead. Keep your shoulders relaxed and your feet 3 feet from the wall and shoulder's width apart. Place a soccer or basketball-sized ball behind your back. Keeping your back against the wall, slowly squat down to a 45-degree angle. Your thighs will not yet be parallel to the floor. Hold this position for 10 seconds and then slowly slide back up the wall. Repeat 10 times. Build up to 2 sets of 15.   Clam exercise: Lie on your uninjured side with your hips and knees bent and feet together. Slowly raise your top leg toward the ceiling while keeping your heels touching each other. Hold for 2 seconds and lower slowly. Do 2 sets of 15 repetitions.   Written by Nena Toth, MS, PT, and Carina Bassett, PT, DHSc, OCS, for [facility].   Adult Advisor 2012.1 published by [employer].  Last modified: 2011-08-17  Last reviewed: 2011-05-25                              Follow-ups after your visit        Follow-up notes from your care team      "Return in about 6 months (around 2/21/2019) for [facility].      Who to contact     If you have questions or need follow up information about today's clinic visit or your schedule please contact [facility] directly at [phone].  Normal or non-critical lab and imaging results will be communicated to you by [employer], letter or phone within 4 business days after the clinic has received the results. If you do not hear from us within 7 days, please contact the clinic through [employer] or phone. If you have a critical or abnormal lab result, we will notify you by phone as soon as possible.  Submit refill requests through [employer] or call your pharmacy and they will forward the refill request to us. Please allow 3 business days for your refill to be completed.          Additional Information About Your Visit        [employer] Information     [employer] gives you secure access to your electronic health record. If you see a primary care provider, you can also send messages to your care team and make appointments. If you have questions, please call your primary care clinic.  If you do not have a primary care provider, please call [phone] and they will assist you.        Care EveryWhere ID     This is your Care EveryWhere ID. This could be used by other organizations to access your [place] medical records  [id number]        Your Vitals Were     Pulse Temperature Height Last Period Pulse Oximetry BMI (Body Mass Index)    82 97.3  F (36.3  C) (Oral) 5' 7.25\" (1.708 m) 03/17/2014 97% 37 kg/m2       Blood Pressure from Last 3 Encounters:   08/21/18 122/80   07/13/18 124/78   06/01/18 132/80    Weight from Last 3 Encounters:   08/21/18 238 lb (108 kg)   07/13/18 238 lb (108 kg)   06/01/18 237 lb (107.5 kg)              Today, you had the following     No orders found for display         Today's Medication Changes          These changes are accurate as of 8/21/18  4:11 PM.  If you have any questions, ask your nurse " or doctor.               Start taking these medicines.        Dose/Directions    naproxen sodium 220 MG tablet   Commonly known as:  ALEVE   Used for:  Trochanteric bursitis of right hip   Started by:  Prudencio Conde MD        Dose:  440 mg   Take 2 tablets (440 mg) by mouth 2 times daily (with meals) for 7 days   Quantity:  180 tablet   Refills:  3         These medicines have changed or have updated prescriptions.        Dose/Directions    escitalopram 20 MG tablet   Commonly known as:  LEXAPRO   This may have changed:    - how much to take  - how to take this  - when to take this   Used for:  Anxiety   Changed by:  Prudencio Conde MD        Dose:  20 mg   Take 1 tablet (20 mg) by mouth daily   Quantity:  90 tablet   Refills:  1            Where to get your medicines      [facility] [id number] [place] [address] [facility] AT SEC of [facility] [address]  [address] [facility] [address]     Phone:  [phone]     escitalopram 20 MG tablet    olopatadine 0.1 % ophthalmic solution         Some of these will need a paper prescription and others can be bought over the counter.  Ask your nurse if you have questions.     You don't need a prescription for these medications     naproxen sodium 220 MG tablet                Primary Care Provider Office Phone # Fax #    Prudencio Conde -[phone] [address] [facility] [address]        Equal Access to Services     [facility] AH: Hadii aguila ku hadasho Soomaali, waaxda luqadaha, qaybta kaalmada adeegyada, stephany lang haysuhail benavidez . So [facility] [phone].    ATENCIÓN: Si habla español, tiene a aguirre disposición servicios gratuitos de asistencia lingüística. Llame al [phone].    We comply with applicable federal civil rights laws and [place] laws. We do not discriminate on the basis of race, color, national origin, age, disability, sex, sexual orientation, or gender  identity.            Thank you!     Thank you for choosing [facility]  for your care. Our goal is always to provide you with excellent care. Hearing back from our patients is one way we can continue to improve our services. Please take a few minutes to complete the written survey that you may receive in the mail after your visit with us. Thank you!             Your Updated Medication List - Protect others around you: Learn how to safely use, store and throw away your medicines at www.disposemymeds.org.          This list is accurate as of 8/21/18  4:11 PM.  Always use your most recent med list.                   Brand Name Dispense Instructions for use Diagnosis    Calcium-Magnesium-Zinc 333-133-5 MG Tabs per tablet      Take 1 tablet by mouth At Bedtime        escitalopram 20 MG tablet    LEXAPRO    90 tablet    Take 1 tablet (20 mg) by mouth daily    Anxiety       KRILL OIL PO      Take 1 capsule by mouth every evening        levothyroxine 137 MCG tablet    SYNTHROID/LEVOTHROID    90 tablet    Take 1 tablet (137 mcg) by mouth daily    Hypothyroidism, unspecified type       lisinopril 10 MG tablet    PRINIVIL/ZESTRIL    90 tablet    Take 1 tablet (10 mg) by mouth daily    Hypertension goal BP (blood pressure) < 140/90       MULTIVITAMIN GUMMIES CHILDRENS PO      Take 2 chew tab by mouth daily as needed        naproxen sodium 220 MG tablet    ALEVE    180 tablet    Take 2 tablets (440 mg) by mouth 2 times daily (with meals) for 7 days    Trochanteric bursitis of right hip       NIFEdipine ER osmotic 60 MG Tb24    PROCARDIA XL    90 tablet    Take 1 tablet (60 mg) by mouth daily    Migraine without status migrainosus, not intractable, unspecified migraine type       olopatadine 0.1 % ophthalmic solution    PATANOL    1 Bottle    Place 1 drop into both eyes daily as needed for allergies    Allergic conjunctivitis, bilateral       VITAMIN B-1 PO

## 2018-08-21 NOTE — PATIENT INSTRUCTIONS
Trochanteric Bursitis            What is trochanteric bursitis?   Trochanteric bursitis is irritation or inflammation of the trochanteric bursa. A bursa is a fluid-filled sac that acts as a cushion between tendons, bones, and skin. The trochanteric bursa is located on the upper, outer area of the thigh. There is a bump on the outer side of the upper part of the thigh bone (femur) called the greater trochanter. The trochanteric bursa is located over the greater trochanter.   How does it occur?   The trochanteric bursa may be inflamed by a group of muscles or tendons rubbing over the bursa and causing friction against the thigh bone. This injury can occur with running, walking, or bicycling, especially when the bicycle seat is too high.   What are the symptoms?   You have pain on the upper outer area of your thigh or in your hip. The pain is worse when you walk, bicycle, or go up or down stairs. You have pain when you move your thigh bone and feel tenderness in the area over the greater trochanter.   How is it diagnosed?   Your healthcare provider will ask about your symptoms and examine your hip and thigh.   How is it treated?   To treat this condition:   Put an ice pack, gel pack, or package of frozen vegetables, wrapped in a cloth on the area every 3 to 4 hours, for up to 20 minutes at a time.   Take an anti-inflammatory medicine such as ibuprofen, or other medicine as directed by your provider. Nonsteroidal anti-inflammatory medicines (NSAIDs) may cause stomach bleeding and other problems. These risks increase with age. Read the label and take as directed. Unless recommended by your healthcare provider, do not take for more than 10 days.   Your provider may give you an injection of a corticosteroid medicine.   While you are recovering from your injury you will need to change your sport or activity to one that does not make your condition worse. For example, you may need to swim instead of running  or bicycling. If you are bicycling, you may need to lower your bicycle seat.   How long do the effects last?   The length of recovery depends on many factors such as your age, health, and if you have had a previous injury. Recovery time also depends on the severity of the injury. A bursa that is only mildly inflamed and has just started to hurt may improve within a few weeks. A bursa that is significantly inflamed and has been painful for a long time may take up to a few months to improve. You need to stop doing the activities that cause pain until your bursa has healed. If you continue doing activities that cause pain, your symptoms will return and it will take longer to recover.   When can I return to my normal activities?   Everyone recovers from an injury at a different rate. Return to your activities depends on how soon your leg recovers, not by how many days or weeks it has been since your injury has occurred. In general, the longer you have symptoms before you start treatment, the longer it will take to get better. The goal of rehabilitation is to return to your normal activities as soon as is safely possible. If you return too soon you may worsen your injury.   You may safely return to your normal activities when, starting from the top of the list and progressing to the end, each of the following is true:   You have full range of motion in the injured leg compared to the uninjured leg.   You have full strength of the injured leg compared to the uninjured leg.   You can walk straight ahead without pain or limping.   How can I prevent trochanteric bursitis?   Trochanteric bursitis is best prevented by warming up properly and stretching the muscles on the outer side of your upper thigh.     Published by [employer].  This content is reviewed periodically and is subject to change as new health information becomes available. The information is intended to inform and educate and is not a replacement for medical  evaluation, advice, diagnosis or treatment by a healthcare professional.   Written by Jaylen Vivas MD, for [employer].   ? 2010 [facility] and/or its affiliates. All Rights Reserved.   Copyright   Clinical Reference Systems 2011         Hip Bursitis Exercises   You can do the first 3 stretches to begin stretching the muscles that run along the outside of your hip. You can do the strengthening exercises when the sharp pain lessens.  Stretching exercises  Gluteal stretch: Lie on your back with both knees bent. Rest the ankle of your injured leg over the knee of your other leg. Grasp the thigh of the leg on the uninjured side and pull toward your chest. You will feel a stretch along the buttocks on the injured side and possibly along the outside of your hip. Hold the stretch for 15 to 30 seconds. Repeat 3 times.   Iliotibial band stretch, standing: Cross your uninjured leg in front of the other leg and bend down and reach toward the inside of your back foot. Do not bend your knees. Hold this position for 15 to 30 seconds. Return to the starting position. Repeat 3 times.   Iliotibial band stretch, side-leaning: Stand sideways near a wall with your injured side closest to the wall. Place a hand on the wall for support. Cross the leg farther from the wall over the other leg. Keep the foot closest to the wall flat on the floor. Lean your hips into the wall. Hold the stretch for 15 to 30 seconds. Repeat 3 times   Strengthening exercises  Straight leg raise: Lie on your back with your legs straight out in front of you. Bend the knee on your uninjured side and place the foot flat on the floor. Tighten the thigh muscle on your injured side and lift your leg about 8 inches off the floor. Keep your leg straight and your thigh muscle tight. Slowly lower your leg back down to the floor. Do 2 sets of 15.   Prone hip extension: Lie on your stomach with your legs straight out behind you. Draw your belly button in towards your  spine and tighten your abdominal muscles. Tighten the buttocks and thigh muscles of the leg on your injured side and lift the leg off the floor about 8 inches. Keep your leg straight. Hold for 5 seconds. Then lower your leg and relax. Do 2 sets of 15.   Side-lying leg lift: Lie on your uninjured side. Tighten the front thigh muscles on your injured leg and lift that leg 8 to 10 inches away from the other leg. Keep the leg straight and lower it slowly. Do 2 sets of 15.   Wall squat with a ball: Stand with your back, shoulders, and head against a wall. Look straight ahead. Keep your shoulders relaxed and your feet 3 feet from the wall and shoulder's width apart. Place a soccer or basketball-sized ball behind your back. Keeping your back against the wall, slowly squat down to a 45-degree angle. Your thighs will not yet be parallel to the floor. Hold this position for 10 seconds and then slowly slide back up the wall. Repeat 10 times. Build up to 2 sets of 15.   Clam exercise: Lie on your uninjured side with your hips and knees bent and feet together. Slowly raise your top leg toward the ceiling while keeping your heels touching each other. Hold for 2 seconds and lower slowly. Do 2 sets of 15 repetitions.   Written by Nena Toth MS, PT, and Carina Bassett PT, [facility], [facility], for [facility].   Adult Advisor 2012.1 published by [facility].  Last modified: 2011-08-17  Last reviewed: 2011-05-25

## 2018-10-01 ENCOUNTER — OFFICE VISIT (OUTPATIENT)
Dept: FAMILY MEDICINE | Facility: CLINIC | Age: 48
End: 2018-10-01
Payer: COMMERCIAL

## 2018-10-01 VITALS
BODY MASS INDEX: 37.83 KG/M2 | SYSTOLIC BLOOD PRESSURE: 118 MMHG | HEIGHT: 67 IN | OXYGEN SATURATION: 97 % | DIASTOLIC BLOOD PRESSURE: 78 MMHG | WEIGHT: 241 LBS | HEART RATE: 89 BPM | TEMPERATURE: 98.4 F

## 2018-10-01 DIAGNOSIS — M25.50 MULTIPLE JOINT PAIN: ICD-10-CM

## 2018-10-01 DIAGNOSIS — M70.61 TROCHANTERIC BURSITIS OF RIGHT HIP: Primary | ICD-10-CM

## 2018-10-01 PROCEDURE — 20610 DRAIN/INJ JOINT/BURSA W/O US: CPT | Mod: RT | Performed by: FAMILY MEDICINE

## 2018-10-01 PROCEDURE — 99213 OFFICE O/P EST LOW 20 MIN: CPT | Mod: 25 | Performed by: FAMILY MEDICINE

## 2018-10-01 NOTE — MR AVS SNAPSHOT
After Visit Summary   10/1/2018    Christine Dubose    MRN: [id number]           Patient Information     Date Of Birth          1970        Visit Information        Provider Department      10/1/2018 2:20 PM Prudencio Conde MD [facility]        Today's Diagnoses     Trochanteric bursitis of right hip    -  1    Multiple joint pain           Follow-ups after your visit        Additional Services     RHEUMATOLOGY REFERRAL       Your provider has referred you to:     [facility]: Rheumatology Clinic [facility] [phone]   [email or web address]    OR    [facility] - [place] [phone]   [email or web address]    Please be aware that coverage of these services is subject to the terms and limitations of your health insurance plan.  Call member services at your health plan with any benefit or coverage questions.      Please bring the following with you to your appointment:    (1) Any X-Rays, CTs or MRIs which have been performed.  Contact the facility where they were done to arrange for  prior to your scheduled appointment.    (2) List of current medications   (3) This referral request   (4) Any documents/labs given to you for this referral                  Follow-up notes from your care team     Return in about 2 months (around 12/1/2018) for recheck and referral to physical therapy if needed. .      Who to contact     If you have questions or need follow up information about today's clinic visit or your schedule please contact [facility] directly at [phone].  Normal or non-critical lab and imaging results will be communicated to you by MyChart, letter or phone within 4 business days after the clinic has received the results. If you do not hear from us within 7 days, please contact the clinic through MyChart or phone. If you have a critical or abnormal lab result, we will notify you by  "phone as soon as possible.  Submit refill requests through Tang[employer] or call your pharmacy and they will forward the refill request to us. Please allow 3 business days for your refill to be completed.          Additional Information About Your Visit        [employer] Information     [employer]angled gives you secure access to your electronic health record. If you see a primary care provider, you can also send messages to your care team and make appointments. If you have questions, please call your primary care clinic.  If you do not have a primary care provider, please call [phone] and they will assist you.        Care EveryWhere ID     This is your Care EveryWhere ID. This could be used by other organizations to access your [place] medical records  [id number]        Your Vitals Were     Pulse Temperature Height Last Period Pulse Oximetry BMI (Body Mass Index)    89 98.4  F (36.9  C) (Oral) 5' 7.25\" (1.708 m) 03/17/2014 97% 37.47 kg/m2       Blood Pressure from Last 3 Encounters:   10/01/18 118/78   08/21/18 122/80   07/13/18 124/78    Weight from Last 3 Encounters:   10/01/18 241 lb (109.3 kg)   08/21/18 238 lb (108 kg)   07/13/18 238 lb (108 kg)              We Performed the Following     DRAIN/INJECT LARGE JOINT/BURSA     RHEUMATOLOGY REFERRAL        Primary Care Provider Office Phone # Fax #    Prudencio Conde [phone]       [address] [place] [id number]        Equal Access to Services     [facility]: Hadii aad ku hadasho Soomaali, waaxda luqadaha, qaybta kaalmada adeegyada, waxay precious benavidez . So [facility] [phone].    ATENCIÓN: Si habla español, tiene a aguirre disposición servicios gratuitos de asistencia lingüística. Llame al [phone].    We comply with applicable federal civil rights laws and [place] laws. We do not discriminate on the basis of race, color, national origin, age, disability, sex, sexual orientation, or gender identity.            Thank " you!     Thank you for choosing [facility]  for your care. Our goal is always to provide you with excellent care. Hearing back from our patients is one way we can continue to improve our services. Please take a few minutes to complete the written survey that you may receive in the mail after your visit with us. Thank you!             Your Updated Medication List - Protect others around you: Learn how to safely use, store and throw away your medicines at www.disposemymeds.org.          This list is accurate as of 10/1/18  3:11 PM.  Always use your most recent med list.                   Brand Name Dispense Instructions for use Diagnosis    Calcium-Magnesium-Zinc 333-133-5 MG Tabs per tablet      Take 1 tablet by mouth At Bedtime        escitalopram 20 MG tablet    LEXAPRO    90 tablet    Take 1 tablet (20 mg) by mouth daily    Anxiety       KRILL OIL PO      Take 1 capsule by mouth every evening        levothyroxine 137 MCG tablet    SYNTHROID/LEVOTHROID    90 tablet    Take 1 tablet (137 mcg) by mouth daily    Hypothyroidism, unspecified type       lisinopril 10 MG tablet    PRINIVIL/ZESTRIL    90 tablet    Take 1 tablet (10 mg) by mouth daily    Hypertension goal BP (blood pressure) < 140/90       MULTIVITAMIN GUMMIES CHILDRENS PO      Take 2 chew tab by mouth daily as needed        NIFEdipine ER osmotic 60 MG Tb24    PROCARDIA XL    90 tablet    Take 1 tablet (60 mg) by mouth daily    Migraine without status migrainosus, not intractable, unspecified migraine type       olopatadine 0.1 % ophthalmic solution    PATANOL    1 Bottle    Place 1 drop into both eyes daily as needed for allergies    Allergic conjunctivitis, bilateral       VITAMIN B-1 PO

## 2018-10-01 NOTE — PROGRESS NOTES
"  SUBJECTIVE:                                                      Christine Dubose is a 48 year old female who presents to clinic today for the following health issues:    Right hip pain x 2 months flare up- pt has had injection before from orthopedics- having hard time sleeping    Problem list and histories reviewed & adjusted, as indicated.  Additional history: as documented    ROS:  Constitutional, HEENT, cardiovascular, pulmonary, GI, , musculoskeletal, neuro, skin, endocrine and psych systems are negative, except as otherwise noted.    OBJECTIVE:                                                      /78  Pulse 89  Temp 98.4  F (36.9  C) (Oral)  Ht 5' 7.25\" (1.708 m)  Wt 241 lb (109.3 kg)  LMP 03/17/2014  SpO2 97%  BMI 37.47 kg/m2 Body mass index is 37.47 kg/(m^2).   GENERAL: healthy, alert, well nourished, well hydrated, no distress  NECK: no tenderness, no adenopathy, no asymmetry, no masses, no stiffness; thyroid- normal to palpation  RESP: lungs clear to auscultation - no rales, no rhonchi, no wheezes  CV: regular rates and rhythm, normal S1 S2, no S3 or S4 and no murmur, no click or rub -  ABDOMEN: soft, no tenderness, no  hepatosplenomegaly, no masses, normal bowel sounds  MS: tender over the right lateral trochanter otherwise, good ROM of the hip otherwise extremities- no gross deformities noted, no edema  SKIN: no suspicious lesions, no rashes  NEURO: strength and tone- normal, sensory exam- grossly normal, mentation- intact, speech- normal, reflexes- symmetric  BACK: no CVA tenderness, no paralumbar tenderness    After consent was obtained, using sterile technique the right lateral hip was prepped and 4cc's of 1% plain Lidocaine and 40 mg Depomedrol was then injected and the needle withdrawn.  The procedure was well tolerated.  The patient is asked to continue to rest the leg for a few more days before resuming regular activities.  It may be more painful for the first 1-2 days.  Watch for " fever, or increased swelling or persistent pain in knee. Call or return to clinic prn if such symptoms occur or the pain fails to improve as anticipated.      ASSESSMENT/PLAN:                                                      Christine was seen today for imm/inj.    Diagnoses and all orders for this visit:    Trochanteric bursitis of right hip - continue stretching and   -     DRAIN/INJECT LARGE JOINT/BURSA    Multiple joint pain  -     RHEUMATOLOGY REFERRAL      Risks, benefits and alternatives of treatments discussed. Plan agreed on.      Followup: Return in about 2 months (around 12/1/2018) for recheck and referral to physical therapy if needed. .    See patient instructions.           Sonny Conde MD   Pager: [phone]

## 2019-02-22 ENCOUNTER — OFFICE VISIT (OUTPATIENT)
Dept: FAMILY MEDICINE | Facility: CLINIC | Age: 49
End: 2019-02-22

## 2019-02-22 VITALS
DIASTOLIC BLOOD PRESSURE: 78 MMHG | HEART RATE: 99 BPM | SYSTOLIC BLOOD PRESSURE: 112 MMHG | TEMPERATURE: 98.2 F | OXYGEN SATURATION: 97 % | BODY MASS INDEX: 37.83 KG/M2 | HEIGHT: 67 IN | WEIGHT: 241 LBS

## 2019-02-22 DIAGNOSIS — M70.61 TROCHANTERIC BURSITIS OF RIGHT HIP: Primary | ICD-10-CM

## 2019-02-22 DIAGNOSIS — S80.02XA CONTUSION OF LEFT KNEE, INITIAL ENCOUNTER: ICD-10-CM

## 2019-02-22 PROCEDURE — 20610 DRAIN/INJ JOINT/BURSA W/O US: CPT | Mod: RT | Performed by: FAMILY MEDICINE

## 2019-02-22 PROCEDURE — 99212 OFFICE O/P EST SF 10 MIN: CPT | Mod: 25 | Performed by: FAMILY MEDICINE

## 2019-02-22 RX ORDER — TRIAMCINOLONE ACETONIDE 40 MG/ML
40 INJECTION, SUSPENSION INTRA-ARTICULAR; INTRAMUSCULAR ONCE
Status: DISCONTINUED | OUTPATIENT
Start: 2019-02-22 | End: 2019-07-23

## 2019-02-22 ASSESSMENT — MIFFLIN-ST. JEOR: SCORE: 1759.76

## 2019-02-22 NOTE — PROGRESS NOTES
"  SUBJECTIVE:                                                      Christine Dubose is a 48 year old female who presents to clinic today for the following health issues:    Recurrent right lateral hip pain  Hip injection right hip last injection 10/01/46900 months ago    Left knee pain 4 days ago- fell on ice  Problem list and histories reviewed & adjusted, as indicated.  Additional history: as documented    ROS:  Constitutional, HEENT, cardiovascular, pulmonary, GI, , musculoskeletal, neuro, skin, endocrine and psych systems are negative, except as otherwise noted.    OBJECTIVE:                                                      /78   Pulse 99   Temp 98.2  F (36.8  C) (Oral)   Ht 1.708 m (5' 7.25\")   Wt 109.3 kg (241 lb)   LMP 03/17/2014   SpO2 97%   BMI 37.47 kg/m   Body mass index is 37.47 kg/m .   GENERAL: healthy, alert, well nourished, well hydrated, no distress  HENT: ear canals- normal; TMs- normal; Nose- normal; Mouth- no ulcers, no lesions  NECK: no tenderness, no adenopathy, no asymmetry, no masses, no stiffness; thyroid- normal to palpation  RESP: lungs clear to auscultation - no rales, no rhonchi, no wheezes  CV: regular rates and rhythm, normal S1 S2, no S3 or S4 and no murmur, no click or rub -  ABDOMEN: soft, no tenderness, no  hepatosplenomegaly, no masses, normal bowel sounds  MS: tender over the right lateral trochanter otherwise, good ROM of the hip otherwise extremities- no gross deformities noted, no edema  SKIN: no suspicious lesions, no rashes  NEURO: strength and tone- normal, sensory exam- grossly normal, mentation- intact, speech- normal, reflexes- symmetric  BACK: no CVA tenderness, no paralumbar tenderness    After consent was obtained, using sterile technique the right lateral hip was prepped and 4cc's of 1% plain Lidocaine and 40 mg Depomedrol was then injected and the needle withdrawn.  The procedure was well tolerated.  The patient is asked to continue to rest the " leg for a few more days before resuming regular activities.  It may be more painful for the first 1-2 days.  Watch for fever, or increased swelling or persistent pain in knee. Call or return to clinic prn if such symptoms occur or the pain fails to improve as anticipated.    ASSESSMENT/PLAN:                                                      Christine was seen today for imm/inj.    Diagnoses and all orders for this visit:    Trochanteric bursitis of right hip  -     DRAIN/INJECT LARGE JOINT/BURSA  -     triamcinolone (KENALOG-40) injection 40 mg  -     lidocaine 1 % 4 mL    After consent was obtained, using sterile technique the left lateral hip was prepped and 8cc's of 1% plain Lidocaine and 40 mg Depomedrol was then injected and the needle withdrawn.  The procedure was well tolerated.  The patient is asked to continue to rest the leg for a few more days before resuming regular activities.  It may be more painful for the first 1-2 days.  Watch for fever, or increased swelling or persistent pain in knee. Call or return to clinic prn if such symptoms occur or the pain fails to improve as anticipated.      Contusion of left knee, initial encounter - ice ROM exercises        Risks, benefits and alternatives of treatments discussed. Plan agreed on.      Followup: Return in about 3 months (around 5/22/2019), or if symptoms worsen or fail to improve, for recheck.    See patient instructions.           Sonny Conde MD   Pager: [phone]

## 2019-03-28 DIAGNOSIS — E03.9 HYPOTHYROIDISM, UNSPECIFIED TYPE: ICD-10-CM

## 2019-03-28 RX ORDER — LEVOTHYROXINE SODIUM 137 UG/1
TABLET ORAL
Qty: 90 TABLET | Refills: 0 | Status: CANCELLED | OUTPATIENT
Start: 2019-03-28

## 2019-03-28 RX ORDER — LEVOTHYROXINE SODIUM 137 UG/1
TABLET ORAL
Qty: 90 TABLET | Refills: 0 | Status: SHIPPED | OUTPATIENT
Start: 2019-03-28 | End: 2019-05-03

## 2019-03-28 NOTE — TELEPHONE ENCOUNTER
"Requested Prescriptions   Pending Prescriptions Disp Refills     levothyroxine (SYNTHROID/LEVOTHROID) 137 MCG tablet [Pharmacy Med Name: LEVOTHYROXINE 0.137MG (137MCG) TAB] 90 tablet 0     Sig: TAKE 1 TABLET(137 MCG) BY MOUTH DAILY    Last Refill:   levothyroxine (SYNTHROID/LEVOTHROID) 137 MCG tablet 90 tablet 3 4/30/2018  No   Sig - Route: Take 1 tablet (137 mcg) by mouth daily - Oral     Thyroid Protocol Passed - 3/28/2019  2:23 PM       Passed - Patient is 12 years or older       Passed - Recent (12 mo) or future (30 days) visit within the authorizing provider's specialty    Patient had office visit in the last 12 months or has a visit in the next 30 days with authorizing provider or within the authorizing provider's specialty.  See \"Patient Info\" tab in inbasket, or \"Choose Columns\" in Meds & Orders section of the refill encounter.      LOV: 2/22/19         Passed - Medication is active on med list       Passed - Normal TSH on file in past 12 months    Recent Labs   Lab Test 04/30/18  1021   TSH 2.08           Passed - No active pregnancy on record    If patient is pregnant or has had a positive pregnancy test, please check TSH.       Passed - No positive pregnancy test in past 12 months    If patient is pregnant or has had a positive pregnancy test, please check TSH.        Patient due for recheck of thyroid labs after 04/30/2019  Refilled per RN Protocol.     Sharon Davila RN  [facility] Triage      "

## 2019-03-31 ENCOUNTER — APPOINTMENT (OUTPATIENT)
Dept: CT IMAGING | Facility: CLINIC | Age: 49
End: 2019-03-31
Attending: EMERGENCY MEDICINE
Payer: COMMERCIAL

## 2019-03-31 ENCOUNTER — HOSPITAL ENCOUNTER (EMERGENCY)
Facility: CLINIC | Age: 49
Discharge: HOME | End: 2019-03-31
Attending: EMERGENCY MEDICINE | Admitting: EMERGENCY MEDICINE
Payer: COMMERCIAL

## 2019-03-31 ENCOUNTER — APPOINTMENT (OUTPATIENT)
Dept: ULTRASOUND IMAGING | Facility: CLINIC | Age: 49
End: 2019-03-31
Attending: EMERGENCY MEDICINE
Payer: COMMERCIAL

## 2019-03-31 VITALS
DIASTOLIC BLOOD PRESSURE: 83 MMHG | HEART RATE: 67 BPM | HEIGHT: 66 IN | OXYGEN SATURATION: 98 % | SYSTOLIC BLOOD PRESSURE: 138 MMHG | TEMPERATURE: 97.6 F | RESPIRATION RATE: 18 BRPM | BODY MASS INDEX: 36.16 KG/M2 | WEIGHT: 225 LBS

## 2019-03-31 DIAGNOSIS — N83.202 LEFT OVARIAN CYST: ICD-10-CM

## 2019-03-31 DIAGNOSIS — R10.32 LEFT LOWER QUADRANT PAIN: ICD-10-CM

## 2019-03-31 LAB
ALBUMIN UR-MCNC: NEGATIVE MG/DL
ANION GAP SERPL CALCULATED.3IONS-SCNC: 5 MMOL/L (ref 3–14)
APPEARANCE UR: ABNORMAL
BASOPHILS # BLD AUTO: 0.1 10E9/L (ref 0–0.2)
BASOPHILS NFR BLD AUTO: 0.7 %
BILIRUB UR QL STRIP: NEGATIVE
BUN SERPL-MCNC: 11 MG/DL (ref 7–30)
CALCIUM SERPL-MCNC: 9.3 MG/DL (ref 8.5–10.1)
CHLORIDE SERPL-SCNC: 104 MMOL/L (ref 94–109)
CO2 SERPL-SCNC: 28 MMOL/L (ref 20–32)
COLOR UR AUTO: YELLOW
CREAT SERPL-MCNC: 0.65 MG/DL (ref 0.52–1.04)
DIFFERENTIAL METHOD BLD: ABNORMAL
EOSINOPHIL # BLD AUTO: 0.1 10E9/L (ref 0–0.7)
EOSINOPHIL NFR BLD AUTO: 0.9 %
ERYTHROCYTE [DISTWIDTH] IN BLOOD BY AUTOMATED COUNT: 13.8 % (ref 10–15)
GFR SERPL CREATININE-BSD FRML MDRD: >90 ML/MIN/{1.73_M2}
GLUCOSE SERPL-MCNC: 85 MG/DL (ref 70–99)
GLUCOSE UR STRIP-MCNC: NEGATIVE MG/DL
HCT VFR BLD AUTO: 46.7 % (ref 35–47)
HGB BLD-MCNC: 15.1 G/DL (ref 11.7–15.7)
HGB UR QL STRIP: NEGATIVE
IMM GRANULOCYTES # BLD: 0.1 10E9/L (ref 0–0.4)
IMM GRANULOCYTES NFR BLD: 0.6 %
KETONES UR STRIP-MCNC: NEGATIVE MG/DL
LEUKOCYTE ESTERASE UR QL STRIP: NEGATIVE
LYMPHOCYTES # BLD AUTO: 1.6 10E9/L (ref 0.8–5.3)
LYMPHOCYTES NFR BLD AUTO: 18.4 %
MCH RBC QN AUTO: 28.4 PG (ref 26.5–33)
MCHC RBC AUTO-ENTMCNC: 32.3 G/DL (ref 31.5–36.5)
MCV RBC AUTO: 88 FL (ref 78–100)
MONOCYTES # BLD AUTO: 0.5 10E9/L (ref 0–1.3)
MONOCYTES NFR BLD AUTO: 5.3 %
NEUTROPHILS # BLD AUTO: 6.6 10E9/L (ref 1.6–8.3)
NEUTROPHILS NFR BLD AUTO: 74.1 %
NITRATE UR QL: NEGATIVE
NRBC # BLD AUTO: 0 10*3/UL
NRBC BLD AUTO-RTO: 0 /100
PH UR STRIP: 7.5 PH (ref 5–7)
PLATELET # BLD AUTO: 270 10E9/L (ref 150–450)
POTASSIUM SERPL-SCNC: 3.9 MMOL/L (ref 3.4–5.3)
RBC # BLD AUTO: 5.31 10E12/L (ref 3.8–5.2)
RBC #/AREA URNS AUTO: 1 /HPF (ref 0–2)
SODIUM SERPL-SCNC: 137 MMOL/L (ref 133–144)
SOURCE: ABNORMAL
SP GR UR STRIP: 1.02 (ref 1–1.03)
SQUAMOUS #/AREA URNS AUTO: 1 /HPF (ref 0–1)
UROBILINOGEN UR STRIP-MCNC: NORMAL MG/DL (ref 0–2)
WBC # BLD AUTO: 8.9 10E9/L (ref 4–11)
WBC #/AREA URNS AUTO: 3 /HPF (ref 0–5)

## 2019-03-31 PROCEDURE — 80048 BASIC METABOLIC PNL TOTAL CA: CPT | Performed by: EMERGENCY MEDICINE

## 2019-03-31 PROCEDURE — 25000131 ZZH RX MED GY IP 250 OP 636 PS 637: Performed by: EMERGENCY MEDICINE

## 2019-03-31 PROCEDURE — 99285 EMERGENCY DEPT VISIT HI MDM: CPT | Mod: 25

## 2019-03-31 PROCEDURE — 81001 URINALYSIS AUTO W/SCOPE: CPT | Performed by: EMERGENCY MEDICINE

## 2019-03-31 PROCEDURE — 74177 CT ABD & PELVIS W/CONTRAST: CPT

## 2019-03-31 PROCEDURE — 25000128 H RX IP 250 OP 636: Performed by: EMERGENCY MEDICINE

## 2019-03-31 PROCEDURE — 85025 COMPLETE CBC W/AUTO DIFF WBC: CPT | Performed by: EMERGENCY MEDICINE

## 2019-03-31 PROCEDURE — 93976 VASCULAR STUDY: CPT

## 2019-03-31 PROCEDURE — 96374 THER/PROPH/DIAG INJ IV PUSH: CPT | Mod: 59

## 2019-03-31 RX ORDER — KETOROLAC TROMETHAMINE 15 MG/ML
15 INJECTION, SOLUTION INTRAMUSCULAR; INTRAVENOUS ONCE
Status: COMPLETED | OUTPATIENT
Start: 2019-03-31 | End: 2019-03-31

## 2019-03-31 RX ORDER — ONDANSETRON 4 MG/1
4 TABLET, ORALLY DISINTEGRATING ORAL
Status: COMPLETED | OUTPATIENT
Start: 2019-03-31 | End: 2019-03-31

## 2019-03-31 RX ORDER — HYDROCODONE BITARTRATE AND ACETAMINOPHEN 5; 325 MG/1; MG/1
1 TABLET ORAL EVERY 6 HOURS PRN
Qty: 10 TABLET | Refills: 0 | Status: SHIPPED | OUTPATIENT
Start: 2019-03-31 | End: 2019-05-03

## 2019-03-31 RX ORDER — IOPAMIDOL 755 MG/ML
500 INJECTION, SOLUTION INTRAVASCULAR ONCE
Status: COMPLETED | OUTPATIENT
Start: 2019-03-31 | End: 2019-03-31

## 2019-03-31 RX ADMIN — KETOROLAC TROMETHAMINE 15 MG: 15 INJECTION, SOLUTION INTRAMUSCULAR; INTRAVENOUS at 15:39

## 2019-03-31 RX ADMIN — IOPAMIDOL 100 ML: 755 INJECTION, SOLUTION INTRAVENOUS at 16:01

## 2019-03-31 RX ADMIN — ONDANSETRON 4 MG: 4 TABLET, ORALLY DISINTEGRATING ORAL at 15:05

## 2019-03-31 RX ADMIN — SODIUM CHLORIDE 65 ML: 9 INJECTION, SOLUTION INTRAVENOUS at 16:01

## 2019-03-31 ASSESSMENT — ENCOUNTER SYMPTOMS
DIFFICULTY URINATING: 1
FLANK PAIN: 1
NAUSEA: 1
SHORTNESS OF BREATH: 0
FEVER: 0
COUGH: 0
VOMITING: 0

## 2019-03-31 ASSESSMENT — MIFFLIN-ST. JEOR: SCORE: 1667.34

## 2019-03-31 NOTE — ED PROVIDER NOTES
History     Chief Complaint:  Flank Pain    HPI   Christine Dubose is a 48 year old female who presents to the emergency department today with flank pain. Patient started having left flank pain last night, which has been constantly present since then. The pain radiates down to the groin area. Patient notes some nausea. She denies vomiting. She denies recent cough, shortness of breath, chest pain, fever. Patient has not eaten since pain started. Patient notes some decreased urine and mild odor. She denies recent trauma or fall. She denies history of kidney stones.     Allergies:  Imitrex [Sumatriptan]     Medications:    Calcium-magnesium-zinc 333-133-5 Mg Tabs  Escitalopram (Lexapro) 20 Mg Tablet  Krill Oil Po  Levothyroxine (Synthroid/levothroid) 137 Mcg Tablet  Lisinopril (Prinivil/zestril) 10 Mg Tablet  Nifedipine Er Osmotic (Procardia Xl) 60 Mg Tb24  Olopatadine (Patanol) 0.1 % Ophthalmic Solution  Pediatric Multivit-minerals-c (Multivitamin Gummies Childrens Po)  Thiamine Hcl (Vitamin B-1 Po)    Past Medical History:    Abnormal gait  Anxiety  Arthralgia of right acromioclavicular joint  Arthritis   Arthritis of right hand  Chronic pain   Depression   Dysfunctional uterine bleeding   Heart murmur   History of arthroplasty of left knee  Hypertension   Hypertension    Hypothyroidism, unspecified type  Knee joint replacement by other means  Low back pain  Menorrhagia   Migraine  Miscarriage   Morbid obesity (H)  MVP (mitral valve prolapse)   Osteoarthritis  Other chronic pain   Other forms of migraine, without mention of intractable migraine without mention of status migrainosus   Posttraumatic stress disorder  Seizures (H)   Stress incontinence, female   Synovial cyst  Thyroid disease      Past Surgical History:    Arthroplasty knee    section  Cystocele repair   Cystoscopy   Decompression lumbar one level  Dilation and curettage   Hysterectomy  Tonsillectomy   Bladder sling  Strip vein    Family  "History:    Hypertension  Hyperlipidemia  Thyroid disease   Diabetes  Osteoporosis   Stomach cancer   Lung cancer     Social History:  The patient was accompanied to the ED by .  Smoking Status: Never  Smokeless Tobacco: Never  Alcohol Use: Yes   Marital Status:       Review of Systems   Constitutional: Negative for fever.   Respiratory: Negative for cough and shortness of breath.    Cardiovascular: Negative for chest pain.   Gastrointestinal: Positive for nausea. Negative for vomiting.   Genitourinary: Positive for difficulty urinating and flank pain (left, radiates to groin).   All other systems reviewed and are negative.      Physical Exam     Patient Vitals for the past 24 hrs:   BP Temp Temp src Pulse Resp SpO2 Height Weight   03/31/19 1700 138/83 -- -- 67 -- 98 % -- --   03/31/19 1645 135/79 -- -- 66 -- 94 % -- --   03/31/19 1630 145/86 -- -- 64 -- 98 % -- --   03/31/19 1600 -- -- -- -- -- 95 % -- --   03/31/19 1545 128/74 -- -- 78 -- 96 % -- --   03/31/19 1435 (!) 159/96 97.6  F (36.4  C) Oral 79 18 96 % 1.676 m (5' 6\") 102.1 kg (225 lb)      Physical Exam  Vital signs and nursing notes reviewed.     Constitutional: laying on gurney appears uncomfortable  HENT: Oropharynx is clear and moist  Eyes: Conjunctivae are normal bilaterally. Pupils equal  Neck: normal range of motion  Cardiovascular: Normal rate, regular rhythm, normal heart sounds.   Pulmonary/Chest: Effort normal and breath sounds normal. No respiratory distress.   Abdominal: Soft. Bowel sounds are normal. Localized pain at left lower quadrant pain to palpation.  No significant other areas of abdominal pain noted, no flank pain to palpation.  No masses palpated, no rebound or guarding. No rebound or guarding.   Musculoskeletal: No joint swelling or edema.   Neurological: Alert and oriented. No focal weakness  Skin: Skin is warm and dry. No rash noted.   Psych: normal affect   Emergency Department Course   Imaging:  Radiology findings " were communicated with the patient who voiced understanding of the findings.  US Pelvic Complete w Transvaginal & Abd/Pel Duplex Limited   Final Result   IMPRESSION:     1. Supracervical hysterectomy with nabothian cysts in the remaining   cervix.   2. There are two cystic lesions in left ovary as described above.   These correspond with the findings on the CT scan today.        LESLEY FAJARDO MD      CT Abdomen Pelvis w Contrast   Final Result   IMPRESSION:    1. Left ovarian cystic lesion measures 4.2 cm. Pelvic ultrasound may   be helpful for further evaluation.   2. No other cause for left-sided pain is identified.   3. There is a trace amount of nonspecific free fluid in the pelvis.      ADAM FISHER MD      Report per radiology      Laboratory:  Laboratory findings were communicated with the patient who voiced understanding of the findings.  CBC: AWNL (WBC 8.9, HGB 15.1, )   BMP: AWNL (Creatinine 0.65)   UA: slightly cloudy, yellow urine with 7.5 pH o/w WNL     Interventions:  1505: Zofran 4mg IV   1539: Toradol 15mg IV injection     Emergency Department Course:  Nursing notes and vitals reviewed.  1516: I performed an exam of the patient as documented above.   IV was inserted and blood was drawn for laboratory testing, results above.  The patient provided a urine sample here in the emergency department. This was sent for laboratory testing, findings above.  The patient was sent for a CT Abdomen Pelvis and US Pelvic Complete w Transvaginal while in the emergency department, results above.   1759: Findings and plan explained to the Patient. Patient discharged home with instructions regarding supportive care, medications, and reasons to return. The importance of close follow-up was reviewed. The patient was prescribed Norco.    I personally reviewed the laboratory and imaging results with the Patient and answered all related questions prior to discharge.      Impression & Plan    Medical Decision  Making:  Christine Dubose is a 48 year old female who presents to the ED with acute onset of abdominal pain, most notably in the left lower abdomen, but also felt somewhat in her flank area as well. On arrival patient appeared mildly uncomfortable with normal vital signs, she had no fever or obvious infectious symptomatology. Labs tests were unremarkable. CT imaging of the abdomen and pelvis showed no evidence of colitis, splenic or kidney abnormality, but did show findings of left ovarian cyst. Therefore, an ultrasound was obtained which showed verification of the cyst. This appears to be simple in nature with another small cyst next to it, but no evidence of torsion, ovarian abscess, or free fluid in the pelvis. At recheck patient appeared quite comfortable. I discussed at length that it is possible the left ovarian cyst caused her increased pain today. There is no clear indication she had any other definable cause of her pain. She has no pleuritic area discomfort or lower rib area pain to suggest PE and again her pain is most reproducible at the lower left abdominal area. I discussed with her the findings and need to follow up with OB GYN to ensure that the cyst resolves and does not progress. If however, she has fever, bloody stools, worsening pain, vomiting, shortness of breath, or chest pain, etc. She is to return here. Patient was advised to take an antiinflammatory consistently over the next 2-3 days. All questions were answered prior to discharge.       Diagnosis:    ICD-10-CM    1. Left ovarian cyst N83.202    2. Left lower quadrant pain R10.32        Disposition:  discharged to home    Discharge Medications:     Medication List      Started    HYDROcodone-acetaminophen 5-325 MG tablet  Commonly known as:  NORCO  1 tablet, Oral, EVERY 6 HOURS PRN          Scribe Disclosure:  Chela PORRAS, am serving as a scribe at 6:09 PM on 3/31/2019 to document services personally performed by Craig Frias  MD based on my observations and the provider's statements to me.  3/31/2019   [facility] [place] [facility] EMERGENCY DEPARTMENT       Craig Frias MD  04/01/19 0910

## 2019-03-31 NOTE — ED AVS SNAPSHOT
[facility] Emergency Department  [address]  [facility] [address]  Phone:  [phone]  Fax:  [phone]                                    Christine Dubose   MRN: [id number]    Department:  [facility] Emergency Department   Date of Visit:  3/31/2019           After Visit Summary Signature Page    I have received my discharge instructions, and my questions have been answered. I have discussed any challenges I see with this plan with the nurse or doctor.    ..........................................................................................................................................  Patient/Patient Representative Signature      ..........................................................................................................................................  Patient Representative Print Name and Relationship to Patient    ..................................................               ................................................  Date                                   Time    ..........................................................................................................................................  Reviewed by Signature/Title    ...................................................              ..............................................  Date                                               Time          22EPIC Rev 08/18

## 2019-03-31 NOTE — ED TRIAGE NOTES
ABCs intact. Pt c/o L flank pain radiating around to groin. Denies urinary symptoms. Denies hx kidney stones.

## 2019-04-10 ENCOUNTER — TELEPHONE (OUTPATIENT)
Dept: RHEUMATOLOGY | Facility: CLINIC | Age: 49
End: 2019-04-10

## 2019-04-10 NOTE — LETTER
May 16, 2019    Christine Dubose  [address]  [facility] [address]    Dear Dr. oCnde,  We want to thank you for your support of the Rheumatology Department at [facility]. We value your partnership, and will continue to do our best to serve you and your patients.  However, our new patient appointments are booked several months, and we receive more requests than we are able to accommodate. Our practice currently consists of six physicians, who are responsible for teaching and research commitments as well as patient care. While we are recruiting, there is a continued shortage of available rheumatologists. In order to provide high quality care to our current patients, we limit new patients according to the likelihood that we will be to provide additional tests or treatment options that are of benefit to the patient.  After reviewing the records, we have determined that your patient does not meet criteria for an appointment.  We appreciate your understanding, and ask that you discuss other options with your patient.  We value you as a referring provider, and hope that you will continue to consider us for your patients who require chronic management for conditions where immunosuppressive or immunomodulatory treatments are required or likely benefit.  Sincerely,  The Division of Arthritis and Autoimmune Disorders

## 2019-04-10 NOTE — TELEPHONE ENCOUNTER
[facility] Call Center    Phone Message    May a detailed message be left on voicemail: yes    Reason for Call: Other: Patient is calling to schedule her appointment the referral has been in epic since october please follow up with the patient asap. Thank you.     Action Taken: Message routed to:  [facility] ([facility] rheum

## 2019-04-12 ENCOUNTER — OFFICE VISIT (OUTPATIENT)
Dept: OBGYN | Facility: CLINIC | Age: 49
End: 2019-04-12
Payer: COMMERCIAL

## 2019-04-12 VITALS — SYSTOLIC BLOOD PRESSURE: 126 MMHG | WEIGHT: 237 LBS | DIASTOLIC BLOOD PRESSURE: 78 MMHG | BODY MASS INDEX: 38.25 KG/M2

## 2019-04-12 DIAGNOSIS — N83.202 LEFT OVARIAN CYST: Primary | ICD-10-CM

## 2019-04-12 PROCEDURE — 99213 OFFICE O/P EST LOW 20 MIN: CPT | Performed by: OBSTETRICS & GYNECOLOGY

## 2019-04-12 NOTE — PATIENT INSTRUCTIONS
Reassurance given with ultrasound findings during ER visit in late March.  Reviewed reassuring features of left ovarian cysts and recommend conservative management at this time.  Will repeat pelvic ultrasound in 1 month with us.  Discussed precautions and reasons to return/call sooner from symptom standpoint.

## 2019-04-12 NOTE — PROGRESS NOTES
SUBJECTIVE:                                                   Christine Dubose is a 49 year old female who presents to clinic today for the following health issue(s):  Patient presents with:  Follow Up: L ovarian cyst, ER visit      HPI:  Here today in follow from ER visit on 3/31 for left side/flank pain.  Awoke at 3AM with sharp shooting left sided abdominal pains.  Radiated from left flank to lower abdomen.  Could not get comfortable --had more pain while lying on her left side.  Rated pain 8/10.  Did not get better throughout the AM and went to ED at noon.  CT scan was normal with exception of left ovarian cyst.  Pelvic us showed 2 small left ovarian cysts --both simple in nature and measuring 3.8cm and 2cm.  No free fluid.   Was sent home with pain meds and rested for ~3d.  Used pain meds for a few days and transitioned to ibuprofen/tylenol.  Has been off meds completely now for several days.  Still having dull aching pain --seems to be both in front and back.  No sharp shooting pains --much better than while in ED.  Rates pain today at 2-3/10.  Fairly constant.  No associated symptoms.  No n/v.  No bowel/bladder issue.  S/p LASH in .  No vb/spotting.  No vaginal symptoms.  Back to normal activities    Patient's last menstrual period was 2014..   Patient is sexually active, .  Using hysterectomy for contraception.    reports that she has never smoked. She has never used smokeless tobacco.    STD testing offered?  Declined    Health maintenance updated:  yes      Problem list and histories reviewed & adjusted, as indicated.  Additional history: as documented.    Patient Active Problem List   Diagnosis     CARDIOVASCULAR SCREENING; LDL GOAL LESS THAN 160     Hypertension goal BP (blood pressure) < 140/90     Migraine     Osteoarthritis     Knee joint replacement by other means     Abnormal gait     Low back pain     Synovial cyst     History of arthroplasty of left knee     Posttraumatic  stress disorder     Hypothyroidism, unspecified type     Arthritis of right hand     Arthralgia of right acromioclavicular joint     Morbid obesity (H)     Anxiety     Past Surgical History:   Procedure Laterality Date     ARTHROPLASTY KNEE UNICOMPARTMENT Right 2015    Procedure: ARTHROPLASTY KNEE UNICOMPARTMENT;  Surgeon: Pablo Lara MD;  Location:  OR     ARTHROPLASTY KNEE UNICOMPARTMENT Left 10/27/2015    Procedure: ARTHROPLASTY KNEE UNICOMPARTMENT;  Surgeon: Pablo Lara MD;  Location:  OR      SECTION  1997    for breech     CYCTOCELE REPAIR       CYSTOSCOPY  2014    Procedure: CYSTOSCOPY;;  Surgeon: Candie Crandall MD;  Location: [facility]     DECOMPRESSION LUMBAR ONE LEVEL Right 2015    Procedure: DECOMPRESSION LUMBAR ONE LEVEL;  Surgeon: Elio Starr MD;  Location:  OR     DILATION AND CURETTAGE, OPERATIVE HYSTEROSCOPY, COMBINED  3/8/2012    Procedure:COMBINED DILATION AND CURETTAGE, OPERATIVE HYSTEROSCOPY; OPERATIVE HYSTEROSCOPY, DILATION AND CURETTAGE, POSSIBLE RESECTION OF POLYP; Surgeon:CANDIE CRANDALL; Location:[facility]     ENT SURGERY      TONSILLECTOMY     GENITOURINARY SURGERY      BLADDER SLING     LAPAROSCOPIC HYSTERECTOMY SUPRACERVICAL  2014    Procedure: LAPAROSCOPIC HYSTERECTOMY SUPRACERVICAL;  LAPAROSCOPIC ASSISTED SUPRACERVICAL HYSTERECTOMY, cystoscopy;  Surgeon: Candie Crandall MD;  Location: [facility]     STRIP VEIN  2010     TONSILLECTOMY  1974      Social History     Tobacco Use     Smoking status: Never Smoker     Smokeless tobacco: Never Used   Substance Use Topics     Alcohol use: Yes     Alcohol/week: 0.0 oz     Comment: Socially      Problem (# of Occurrences) Relation (Name,Age of Onset)    Cerebrovascular Disease (1) Paternal Grandmother: TIA    Diabetes (4) Maternal Grandmother (Amy), Maternal Grandfather (Willis), Paternal Grandmother, Paternal Grandfather    Fractures (1) Maternal Grandmother  (Amy): Hip    Hyperlipidemia (2) Mother (Raiza): high triglycerides, Father (Remy): not on meds    Hypertension (4) Mother (Raiza), Father (Remy), Maternal Grandmother (Amy), Paternal Grandmother    Lung Cancer (1) Maternal Grandfather (Willis, 68): smoker    Osteoporosis (1) Maternal Grandmother (Amy)    Stomach Cancer (1) Paternal Grandfather (68)    Thyroid Disease (1) Mother (Raiza)       Negative family history of: Coronary Artery Disease, Breast Cancer, Cancer - colorectal            Current Outpatient Medications   Medication Sig     Calcium-Magnesium-Zinc 333-133-5 MG TABS Take 1 tablet by mouth At Bedtime     escitalopram (LEXAPRO) 20 MG tablet Take 1 tablet (20 mg) by mouth daily     HYDROcodone-acetaminophen (NORCO) 5-325 MG tablet Take 1 tablet by mouth every 6 hours as needed for severe pain     KRILL OIL PO Take 1 capsule by mouth every evening      levothyroxine (SYNTHROID/LEVOTHROID) 137 MCG tablet TAKE 1 TABLET(137 MCG) BY MOUTH DAILY     lisinopril (PRINIVIL/ZESTRIL) 10 MG tablet Take 1 tablet (10 mg) by mouth daily     NIFEdipine ER osmotic (PROCARDIA XL) 60 MG TB24 Take 1 tablet (60 mg) by mouth daily     olopatadine (PATANOL) 0.1 % ophthalmic solution Place 1 drop into both eyes daily as needed for allergies     Pediatric Multivit-Minerals-C (MULTIVITAMIN GUMMIES CHILDRENS PO) Take 2 chew tab by mouth daily as needed     Thiamine HCl (VITAMIN B-1 PO)      Current Facility-Administered Medications   Medication     lidocaine 1 % 4 mL     triamcinolone (KENALOG-40) injection 40 mg     Allergies   Allergen Reactions     Imitrex [Sumatriptan] Other (See Comments)     Throat tightness       ROS:  12 point review of systems negative other than symptoms noted below.  Constitutional: Fatigue  Genitourinary: Cramps    OBJECTIVE:     /78   Wt 107.5 kg (237 lb)   LMP 03/17/2014   Breastfeeding? No   BMI 38.25 kg/m    Body mass index is 38.25 kg/m .    Exam:  Constitutional:   Appearance: Well nourished, well developed alert, in no acute distress  Neurologic/Psychiatric:  Mental Status:  Oriented X3      In-Clinic Test Results:  No results found for this or any previous visit (from the past 24 hour(s)).    ASSESSMENT/PLAN:                                                        ICD-10-CM    1. Left ovarian cyst N83.202 US Transvaginal Non OB       Patient Instructions   Reassurance given with ultrasound findings during ER visit in late March.  Reviewed reassuring features of left ovarian cysts and recommend conservative management at this time.  Will repeat pelvic ultrasound in 1 month with us.  Discussed precautions and reasons to return/call sooner from symptom standpoint.      Christiana Andersen MD  [facility] [place]

## 2019-04-15 NOTE — TELEPHONE ENCOUNTER
Referral has been received in the Rheumatology clinic on 4/10/2019 for multiple joint pain. Our referral process is as follows:     The Rheumatology Clinic will follow up with the patient in 2-3 weeks to start the intake process by completing an intake form and discuss their medical history with them over the phone. In some cases, a Rheumatologist here at [facility] may not be the right doctor to for the patient. Alternative options will be suggested if that is the case.   Linette Senior CMA  4/15/2019 2:37 PM

## 2019-04-18 NOTE — TELEPHONE ENCOUNTER
Call was placed to patient regarding the referral without success.  Message was left asking patient to call the clinic to discuss the referral. Awaiting a call back from the patient.  Linette Senior CMA  4/18/2019 3:44 PM

## 2019-04-19 NOTE — TELEPHONE ENCOUNTER
[facility] Call Center    Phone Message    May a detailed message be left on voicemail: yes    Reason for Call: Other: Patient is returning Linette's call to schedule please follow up with the patient. Thank you.     Action Taken: Message routed to:  [facility] rheum

## 2019-04-26 NOTE — TELEPHONE ENCOUNTER
[facility] Call [facility]    Phone Message    May a detailed message be left on voicemail: yes    Reason for Call: Other: Patient is calling in for Linette and stated she has been trying for 3 weeks to get an appointment. Please follow up with the patient asap.Thank you.     Action Taken: Message routed to:  [facility] rheum

## 2019-04-26 NOTE — TELEPHONE ENCOUNTER
Another attempt made to reach patient.  Linette Senior [facility]  4/26/2019 3:42 PM

## 2019-05-01 NOTE — TELEPHONE ENCOUNTER
Pt was returning Linette's call.  Please have Linette follow up with pt, via her cell phone #[phone], per pt.  Thank you!

## 2019-05-03 ENCOUNTER — OFFICE VISIT (OUTPATIENT)
Dept: FAMILY MEDICINE | Facility: CLINIC | Age: 49
End: 2019-05-03
Payer: COMMERCIAL

## 2019-05-03 VITALS
WEIGHT: 235 LBS | HEART RATE: 80 BPM | TEMPERATURE: 97.8 F | OXYGEN SATURATION: 98 % | HEIGHT: 66 IN | BODY MASS INDEX: 37.77 KG/M2 | SYSTOLIC BLOOD PRESSURE: 120 MMHG | DIASTOLIC BLOOD PRESSURE: 80 MMHG

## 2019-05-03 DIAGNOSIS — E03.9 HYPOTHYROIDISM, UNSPECIFIED TYPE: ICD-10-CM

## 2019-05-03 DIAGNOSIS — I10 HYPERTENSION GOAL BP (BLOOD PRESSURE) < 130/80: ICD-10-CM

## 2019-05-03 DIAGNOSIS — Z00.00 ROUTINE GENERAL MEDICAL EXAMINATION AT A HEALTH CARE FACILITY: Primary | ICD-10-CM

## 2019-05-03 DIAGNOSIS — E66.01 MORBID OBESITY (H): ICD-10-CM

## 2019-05-03 DIAGNOSIS — F41.9 ANXIETY: ICD-10-CM

## 2019-05-03 DIAGNOSIS — G43.909 MIGRAINE WITHOUT STATUS MIGRAINOSUS, NOT INTRACTABLE, UNSPECIFIED MIGRAINE TYPE: ICD-10-CM

## 2019-05-03 DIAGNOSIS — H10.13 ALLERGIC CONJUNCTIVITIS, BILATERAL: ICD-10-CM

## 2019-05-03 LAB
ANION GAP SERPL CALCULATED.3IONS-SCNC: 5 MMOL/L (ref 3–14)
BUN SERPL-MCNC: 14 MG/DL (ref 7–30)
CALCIUM SERPL-MCNC: 8.9 MG/DL (ref 8.5–10.1)
CHLORIDE SERPL-SCNC: 107 MMOL/L (ref 94–109)
CHOLEST SERPL-MCNC: 199 MG/DL
CO2 SERPL-SCNC: 27 MMOL/L (ref 20–32)
CREAT SERPL-MCNC: 0.77 MG/DL (ref 0.52–1.04)
ERYTHROCYTE [DISTWIDTH] IN BLOOD BY AUTOMATED COUNT: 13.6 % (ref 10–15)
GFR SERPL CREATININE-BSD FRML MDRD: >90 ML/MIN/{1.73_M2}
GLUCOSE SERPL-MCNC: 94 MG/DL (ref 70–99)
HCT VFR BLD AUTO: 40.6 % (ref 35–47)
HDLC SERPL-MCNC: 40 MG/DL
HGB BLD-MCNC: 13.5 G/DL (ref 11.7–15.7)
LDLC SERPL CALC-MCNC: 125 MG/DL
MCH RBC QN AUTO: 29.7 PG (ref 26.5–33)
MCHC RBC AUTO-ENTMCNC: 33.3 G/DL (ref 31.5–36.5)
MCV RBC AUTO: 89 FL (ref 78–100)
NONHDLC SERPL-MCNC: 159 MG/DL
PLATELET # BLD AUTO: 256 10E9/L (ref 150–450)
POTASSIUM SERPL-SCNC: 4.1 MMOL/L (ref 3.4–5.3)
RBC # BLD AUTO: 4.55 10E12/L (ref 3.8–5.2)
SODIUM SERPL-SCNC: 140 MMOL/L (ref 133–144)
TRIGL SERPL-MCNC: 171 MG/DL
TSH SERPL DL<=0.005 MIU/L-ACNC: 0.48 MU/L (ref 0.4–4)
WBC # BLD AUTO: 9.5 10E9/L (ref 4–11)

## 2019-05-03 PROCEDURE — 82043 UR ALBUMIN QUANTITATIVE: CPT | Performed by: FAMILY MEDICINE

## 2019-05-03 PROCEDURE — 80048 BASIC METABOLIC PNL TOTAL CA: CPT | Performed by: FAMILY MEDICINE

## 2019-05-03 PROCEDURE — 36415 COLL VENOUS BLD VENIPUNCTURE: CPT | Performed by: FAMILY MEDICINE

## 2019-05-03 PROCEDURE — 85027 COMPLETE CBC AUTOMATED: CPT | Performed by: FAMILY MEDICINE

## 2019-05-03 PROCEDURE — 80061 LIPID PANEL: CPT | Performed by: FAMILY MEDICINE

## 2019-05-03 PROCEDURE — 99396 PREV VISIT EST AGE 40-64: CPT | Performed by: FAMILY MEDICINE

## 2019-05-03 PROCEDURE — 84443 ASSAY THYROID STIM HORMONE: CPT | Performed by: FAMILY MEDICINE

## 2019-05-03 RX ORDER — OLOPATADINE HYDROCHLORIDE 1 MG/ML
1 SOLUTION/ DROPS OPHTHALMIC 2 TIMES DAILY
Qty: 1 BOTTLE | Refills: 3 | Status: SHIPPED | OUTPATIENT
Start: 2019-05-03 | End: 2020-08-19

## 2019-05-03 RX ORDER — LEVOTHYROXINE SODIUM 137 UG/1
137 TABLET ORAL DAILY
Qty: 90 TABLET | Refills: 3 | Status: SHIPPED | OUTPATIENT
Start: 2019-05-03 | End: 2020-07-20

## 2019-05-03 RX ORDER — ESCITALOPRAM OXALATE 20 MG/1
20 TABLET ORAL DAILY
Qty: 90 TABLET | Refills: 1 | Status: SHIPPED | OUTPATIENT
Start: 2019-05-03 | End: 2020-04-20

## 2019-05-03 RX ORDER — NIFEDIPINE 60 MG/1
60 TABLET, EXTENDED RELEASE ORAL DAILY
Qty: 90 TABLET | Refills: 3 | Status: SHIPPED | OUTPATIENT
Start: 2019-05-03 | End: 2020-06-26

## 2019-05-03 RX ORDER — LISINOPRIL 10 MG/1
10 TABLET ORAL DAILY
Qty: 90 TABLET | Refills: 3 | Status: SHIPPED | OUTPATIENT
Start: 2019-05-03 | End: 2020-06-26

## 2019-05-03 ASSESSMENT — MIFFLIN-ST. JEOR: SCORE: 1707.7

## 2019-05-03 NOTE — PROGRESS NOTES
"   SUBJECTIVE:   CC: Christine Dubose is an 49 year old woman who presents for preventive health visit.     Healthy Habits:    Do you get at least three servings of calcium containing foods daily (dairy, green leafy vegetables, etc.)? yes    Amount of exercise or daily activities, outside of work: walking dog    Problems taking medications regularly No    Medication side effects: No    Have you had an eye exam in the past two years? yes    Do you see a dentist twice per year? yes    Do you have sleep apnea, excessive snoring or daytime drowsiness?no      Hypertension Follow-up    Outpatient blood pressures are not being checked.    Low Salt Diet: no added salt    - Currently on lisinopril & nifedipine, with BP measuring 120/80 today in clinic.    Anxiety Follow-Up    Status since last visit: No change    Other associated symptoms:None    Complicating factors:   Significant life event: No   Current substance abuse: None  Depression symptoms: No    JAYDE-7  JAYDE-7 SCORE 6/23/2017 4/30/2018 7/13/2018   Total Score - - -   Total Score 0 0 0     - Currently on Lexapro, with stable mood & no concerns. She does note her mood improves with warm better, is happy it is finally hamzah outside & the days are longer.           Hypothyroidism Follow-up    Since last visit, patient describes the following symptoms: Weight stable, no hair loss, no skin changes, no constipation, no loose stools    - Currently on levothyroxine 137 mcg, no concerns at today's visit. She does note her daughter has now been diagnosed with hypothyroidism.           Weight Loss  Pt has \"cut back\" on unhealthy foods & increased her diet, but feels she is not losing weight like she would like to. She does not drink any sugared pop, has switched to flavored water, but does have a sweet tooth -- will buy cookies or ice cream for her kids, and then eat them herself.             Today's PHQ-2 Score:   PHQ-2 ( 1999 [employer]) 5/3/2019 4/30/2018   Q1: Little interest or " pleasure in doing things 0 1   Q2: Feeling down, depressed or hopeless 0 1   PHQ-2 Score 0 2     Abuse: Current or Past(Physical, Sexual or Emotional)- No  Do you feel safe in your environment? Yes    Social History     Tobacco Use     Smoking status: Never Smoker     Smokeless tobacco: Never Used   Substance Use Topics     Alcohol use: Yes     Alcohol/week: 0.0 oz     Comment: Socially     If you drink alcohol do you typically have >3 drinks per day or >7 drinks per week? No                     Reviewed orders with patient.  Reviewed health maintenance and updated orders accordingly - Yes  Labs reviewed in [facility]    Mammogram Screening: Patient under age 50, mutual decision reflected in health maintenance.    Pertinent mammograms are reviewed under the imaging tab.  History of abnormal Pap smear: NO - age 30-65 PAP every 5 years with negative HPV co-testing recommended    PAP / HPV Latest Ref Rng & Units 6/23/2017   PAP - NIL   HPV 16 DNA NEG Negative   HPV 18 DNA NEG Negative   OTHER HR HPV NEG Negative     Reviewed and updated as needed this visit by clinical staff  Tobacco  Allergies  Meds  Med Hx  Surg Hx  Fam Hx  Soc Hx        Reviewed and updated as needed this visit by Provider  Fam Hx        Past Medical History:   Diagnosis Date     Arthritis      Chronic pain     CHRONIC NECK PAIN     Depression 2005    on Lexapro     Dysfunctional uterine bleeding      Heart murmur      Hypertension      Menorrhagia      Miscarriage 11/2001     MVP (mitral valve prolapse)     Needs antibiotics for procedures     Other chronic pain      Other forms of migraine, without mention of intractable migraine without mention of status migrainosus     takes Zomig     Ovarian cyst 03/31/2019    found in ER, L side     Seizures (H)     Had seizure in 2006. Was on Tegretol, but discontinued the meds on her own.Sees Dr. Gume Morgan [facility].Was started on Depakote in 2008 for migrines in addition to Zomig      "Stress incontinence, female 12/2009    Dr. Kurtz-Cystocoele repair and pubovag slling     Thyroid disease     on synthroid-Hashimotos thyroiditis; sees Lorenza        ROS:  Constitutional, HEENT, cardiovascular, pulmonary, GI, , musculoskeletal, neuro, skin, endocrine and psych systems are negative, except as otherwise noted.        This document serves as a record of the services and decisions personally performed and made by Prudencio Conde MD. It was created on his behalf by Christiana Jaffe, a trained medical scribe. The creation of this document is based the provider's statements to the medical scribe.  Scribe Christiana Jaffe 10:01 AM, May 3, 2019    OBJECTIVE:   /80   Pulse 80   Temp 97.8  F (36.6  C) (Oral)   Ht 1.676 m (5' 6\")   Wt 106.6 kg (235 lb)   LMP 03/17/2014   SpO2 98%   BMI 37.93 kg/m    EXAM:  GENERAL: healthy, alert and no distress  EYES: Eyes grossly normal to inspection, PERRL and conjunctivae and sclerae normal  HENT: ear canals and TM's normal, nose and mouth without ulcers or lesions  NECK: no adenopathy, no asymmetry, masses, or scars and thyroid normal to palpation  RESP: lungs clear to auscultation - no rales, rhonchi or wheezes  BREAST: Deferred -- will complete with OBGYN  CV: regular rate and rhythm, normal S1 S2, no S3 or S4, no murmur, click or rub, no peripheral edema and peripheral pulses strong  ABDOMEN: soft, nontender, no hepatosplenomegaly, no masses and bowel sounds normal   (female): Deferred -- will complete with OBGYN  MS: no gross musculoskeletal defects noted, no edema  SKIN: no suspicious lesions or rashes  NEURO: Normal strength and tone, mentation intact and speech normal  PSYCH: mentation appears normal, affect normal/bright    ASSESSMENT/PLAN:   Christine was seen today for physical.    Diagnoses and all orders for this visit:    Routine general medical examination at a health care facility - Normal exam    Anxiety - Controlled, continue medication  -     " "escitalopram (LEXAPRO) 20 MG tablet; Take 1 tablet (20 mg) by mouth daily     Hypothyroidism, unspecified type - Labs pending; Controlled, continue medication  -     TSH WITH FREE T4 REFLEX  -     levothyroxine (SYNTHROID/LEVOTHROID) 137 MCG tablet; Take 1 tablet (137 mcg) by mouth daily    Hypertension goal BP (blood pressure) < 130/80 - Labs pending; Controlled, continue medication  -     Lipid panel reflex to direct LDL Fasting  -     Albumin Random Urine Quantitative with Creat Ratio  -     lisinopril (PRINIVIL/ZESTRIL) 10 MG tablet; Take 1 tablet (10 mg) by mouth daily  -     CBC with platelets  -     Basic metabolic panel  (Ca, Cl, CO2, Creat, Gluc, K, Na, BUN)    Migraine without status migrainosus, not intractable, unspecified migraine type - Controlled, continue medication  -     NIFEdipine ER OSMOTIC (PROCARDIA XL) 60 MG 24 hr tablet; Take 1 tablet (60 mg) by mouth daily    Allergic conjunctivitis, bilateral - Controlled, continue medication - uses in the fall.   -     olopatadine (PATANOL) 0.1 % ophthalmic solution; Place 1 drop into both eyes 2 times daily    Morbid obesity (H) - Referral given to consult with Medical Weight Loss specialists; Counseled on cutting down on sweets consumption & exercising more regularly  -     BARIATRIC ADULT REFERRAL        COUNSELING:   Reviewed preventive health counseling, as reflected in patient instructions       Regular exercise       Healthy diet/nutrition    BP Readings from Last 1 Encounters:   05/03/19 120/80     Estimated body mass index is 37.93 kg/m  as calculated from the following:    Height as of this encounter: 1.676 m (5' 6\").    Weight as of this encounter: 106.6 kg (235 lb).      Weight management plan: Discussed healthy diet and exercise guidelines     reports that she has never smoked. She has never used smokeless tobacco.      Counseling Resources:  ATP IV Guidelines  Pooled Cohorts Equation Calculator  Breast Cancer Risk Calculator  FRAX Risk " Assessment  ICSI Preventive Guidelines  Dietary Guidelines for Americans, 2010  USDA's MyPlate  ASA Prophylaxis  Lung CA Screening          The information in this document, created by the medical scribe for me, accurately reflects the services I personally performed and the decisions made by me. I have reviewed and approved this document for accuracy prior to leaving the patient care area.  10:01 AM, 05/03/19    Prudencio Conde MD  [facility] [place]

## 2019-05-03 NOTE — TELEPHONE ENCOUNTER
Pt was calling to return Linette's call, as she has not heard back yet.  Please have Linette follow up with pt, per her cell number - [phone].  Thank you!

## 2019-05-04 LAB
CREAT UR-MCNC: 190 MG/DL
MICROALBUMIN UR-MCNC: 9 MG/L
MICROALBUMIN/CREAT UR: 4.96 MG/G CR (ref 0–25)

## 2019-05-05 NOTE — RESULT ENCOUNTER NOTE
Dear Candie,    Here is a summary of your recent test results:  -Normal red blood cell (hgb) levels, normal white blood cell count and normal platelet levels.  -Cholesterol levels (LDL,HDL, Triglycerides) are okay.  ADVISE: rechecking  in 1 year.  -Kidney function is normal (Cr, GFR), Sodium is normal, Potassium is normal, Calcium is normal, Glucose is normal.   -TSH (thyroid stimulating hormone) level is normal which indicates normal thyroid function.  -Microalbumin (urine protein) test is normal.  ADVISE: rechecking this annually.    For additional lab test information, labtestsonline.org is an excellent reference.    Thank you very much for trusting me and [facility].     Healthy regards,  Sonny Conde MD

## 2019-05-08 NOTE — TELEPHONE ENCOUNTER
Spoke with patient who stated that she was driving at this time and was not able to complete the intake form. I suggested to the patient that I can send them to her via [employer] and patient agreed with plan.  Linette Senior CMA  5/8/2019 3:33 PM

## 2019-05-10 ENCOUNTER — OFFICE VISIT (OUTPATIENT)
Dept: OBGYN | Facility: CLINIC | Age: 49
End: 2019-05-10
Attending: OBSTETRICS & GYNECOLOGY
Payer: COMMERCIAL

## 2019-05-10 ENCOUNTER — ANCILLARY PROCEDURE (OUTPATIENT)
Dept: ULTRASOUND IMAGING | Facility: CLINIC | Age: 49
End: 2019-05-10
Attending: OBSTETRICS & GYNECOLOGY
Payer: COMMERCIAL

## 2019-05-10 VITALS — DIASTOLIC BLOOD PRESSURE: 72 MMHG | SYSTOLIC BLOOD PRESSURE: 122 MMHG | WEIGHT: 235 LBS | BODY MASS INDEX: 37.93 KG/M2

## 2019-05-10 DIAGNOSIS — N83.202 LEFT OVARIAN CYST: Primary | ICD-10-CM

## 2019-05-10 DIAGNOSIS — N83.202 LEFT OVARIAN CYST: ICD-10-CM

## 2019-05-10 DIAGNOSIS — N88.8 NABOTHIAN CYST: ICD-10-CM

## 2019-05-10 PROCEDURE — 99213 OFFICE O/P EST LOW 20 MIN: CPT | Performed by: OBSTETRICS & GYNECOLOGY

## 2019-05-10 PROCEDURE — 76830 TRANSVAGINAL US NON-OB: CPT | Performed by: OBSTETRICS & GYNECOLOGY

## 2019-05-10 NOTE — PROGRESS NOTES
SUBJECTIVE:                                                   Christine Dubose is a 49 year old female who presents to clinic today for the following health issue(s):  Patient presents with:  Ultrasound        HPI:  Here today for follow up ultrasound due to left ovarian cysts noted at time of ED visit in late March.  During evaluation of left side pain was found to have 2 small left simple ovarian cysts measuing 3.8cm and 2cm.  Since that time, pain and fullness have improved significantly.  Has not had any issues or discomfort since Easter.  Has not needed medications (ibuprofen/tylenol) since that time.  US today shows only one small residual left ovarian cyst measuring 1.7x1.7x1.3cm.  No free fluid.  Also noted to have likely nabothian cysts at apex of cervix.    Due for yearly exam with me in July    Patient's last menstrual period was 2014..   Patient is sexually active, .  Using hysterectomy for contraception.    reports that she has never smoked. She has never used smokeless tobacco.    STD testing offered?  Declined    Health maintenance updated:  yes      Problem list and histories reviewed & adjusted, as indicated.  Additional history: as documented.    Patient Active Problem List   Diagnosis     CARDIOVASCULAR SCREENING; LDL GOAL LESS THAN 160     Hypertension goal BP (blood pressure) < 130/80     Migraine     Osteoarthritis     Knee joint replacement by other means     Abnormal gait     Low back pain     Synovial cyst     History of arthroplasty of left knee     Posttraumatic stress disorder     Hypothyroidism, unspecified type     Arthritis of right hand     Arthralgia of right acromioclavicular joint     Morbid obesity (H)     Anxiety     Left ovarian cyst     Past Surgical History:   Procedure Laterality Date     ARTHROPLASTY KNEE UNICOMPARTMENT Right 2015    Procedure: ARTHROPLASTY KNEE UNICOMPARTMENT;  Surgeon: Pablo Lara MD;  Location:  OR     ARTHROPLASTY KNEE  UNICOMPARTMENT Left 10/27/2015    Procedure: ARTHROPLASTY KNEE UNICOMPARTMENT;  Surgeon: Pablo Lara MD;  Location:  OR      SECTION  1997    for breech     CYCTOCELE REPAIR       CYSTOSCOPY  2014    Procedure: CYSTOSCOPY;;  Surgeon: Candie Crandall MD;  Location: [facility]     DECOMPRESSION LUMBAR ONE LEVEL Right 2015    Procedure: DECOMPRESSION LUMBAR ONE LEVEL;  Surgeon: Elio Starr MD;  Location:  OR     DILATION AND CURETTAGE, OPERATIVE HYSTEROSCOPY, COMBINED  3/8/2012    Procedure:COMBINED DILATION AND CURETTAGE, OPERATIVE HYSTEROSCOPY; OPERATIVE HYSTEROSCOPY, DILATION AND CURETTAGE, POSSIBLE RESECTION OF POLYP; Surgeon:CANDIE CRANDALL; Location:[facility]     ENT SURGERY      TONSILLECTOMY     GENITOURINARY SURGERY      BLADDER SLING     LAPAROSCOPIC HYSTERECTOMY SUPRACERVICAL  2014    Procedure: LAPAROSCOPIC HYSTERECTOMY SUPRACERVICAL;  LAPAROSCOPIC ASSISTED SUPRACERVICAL HYSTERECTOMY, cystoscopy;  Surgeon: Candie Crandall MD;  Location: [facility]     STRIP VEIN  2010     TONSILLECTOMY  1974      Social History     Tobacco Use     Smoking status: Never Smoker     Smokeless tobacco: Never Used   Substance Use Topics     Alcohol use: Yes     Alcohol/week: 0.0 oz     Comment: Socially      Problem (# of Occurrences) Relation (Name,Age of Onset)    Cerebrovascular Disease (1) Paternal Grandmother: TIA    Diabetes (4) Maternal Grandmother (Amy), Maternal Grandfather (Willis), Paternal Grandmother, Paternal Grandfather    Fractures (1) Maternal Grandmother (Amy): Hip    Hyperlipidemia (2) Mother (Raiza): high triglycerides, Father (Remy): not on meds    Hypertension (4) Mother (Raiza), Father (Remy), Maternal Grandmother (Amy), Paternal Grandmother    Hypothyroidism (1) Daughter    Lung Cancer (1) Maternal Grandfather (Willis, 68): smoker    No Known Problems (3) Son, Son, Son    Osteoporosis (1) Maternal Grandmother  (Amy)    Stomach Cancer (1) Paternal Grandfather (68)    Thyroid Disease (1) Mother (Raiza)       Negative family history of: Coronary Artery Disease, Breast Cancer, Colon Cancer            Current Outpatient Medications   Medication Sig     Calcium-Magnesium-Zinc 333-133-5 MG TABS Take 1 tablet by mouth At Bedtime     escitalopram (LEXAPRO) 20 MG tablet Take 1 tablet (20 mg) by mouth daily     KRILL OIL PO Take 1 capsule by mouth every evening      levothyroxine (SYNTHROID/LEVOTHROID) 137 MCG tablet Take 1 tablet (137 mcg) by mouth daily     lisinopril (PRINIVIL/ZESTRIL) 10 MG tablet Take 1 tablet (10 mg) by mouth daily     NIFEdipine ER OSMOTIC (PROCARDIA XL) 60 MG 24 hr tablet Take 1 tablet (60 mg) by mouth daily     olopatadine (PATANOL) 0.1 % ophthalmic solution Place 1 drop into both eyes 2 times daily     Pediatric Multivit-Minerals-C (MULTIVITAMIN GUMMIES CHILDRENS PO) Take 2 chew tab by mouth daily as needed     Thiamine HCl (VITAMIN B-1 PO)      Current Facility-Administered Medications   Medication     lidocaine 1 % 4 mL     triamcinolone (KENALOG-40) injection 40 mg     Allergies   Allergen Reactions     Imitrex [Sumatriptan] Other (See Comments)     Throat tightness       ROS:  12 point review of systems negative other than symptoms noted below.  Constitutional: Fatigue  Musculoskeletal: Joint Pain    OBJECTIVE:     /72   Wt 106.6 kg (235 lb)   LMP 03/17/2014   Breastfeeding? No   BMI 37.93 kg/m    Body mass index is 37.93 kg/m .    Exam:  Constitutional:  Appearance: Well nourished, well developed alert, in no acute distress  Gastrointestinal:  Abdominal Examination:  Abdomen nontender to palpation, tone normal without rigidity or guarding, no masses present, umbilicus without lesions; Liver/Spleen:  No hepatomegaly present, liver nontender to palpation; Hernias:  No hernias present  Neurologic/Psychiatric:  Mental Status:  Oriented X3   Pelvic Exam:  External Genitalia:     Normal  appearance for age, no discharge present, no tenderness present, no inflammatory lesions present, color normal  Vagina:     Normal vaginal vault without central or paravaginal defects, no discharge present, no inflammatory lesions present, no masses present  Bladder:     Nontender to palpation  Urethra:   Urethral Body:  Urethra palpation normal, urethra structural support normal   Urethral Meatus:  No erythema or lesions present  Cervix:     Appearance healthy, no lesions present, nontender to palpation, no bleeding present; NORMAL APPEARING MULTIPAROUND SOMEWHAT PATULOUS CERVIX; NO FULLNESS OR MASSES APPRECIATED  Uterus:     Surgically absent  Adnexa:     No adnexal tenderness present, no adnexal masses present  Perineum:     Perineum within normal limits, no evidence of trauma, no rashes or skin lesions present  Anus:     Anus within normal limits, no hemorrhoids present  Inguinal Lymph Nodes:     No lymphadenopathy present  Pubic Hair:     Normal pubic hair distribution for age  Genitalia and Groin:     No rashes present, no lesions present, no areas of discoloration, no masses present       In-Clinic Test Results:  No results found for this or any previous visit (from the past 24 hour(s)).    ASSESSMENT/PLAN:                                                        ICD-10-CM    1. Left ovarian cyst N83.202    2. Nabothian cyst N88.8        Patient Instructions   Ultrasound images reviewed today and near resolution of left ovarian cyst noted.  Reassurance given.  Also discussed likely nabothian cyst at apex of cervix and benign nature of this finding.   Will follow up with me for routine yearly exam in July.      Christiana Andersen MD  [facility]

## 2019-05-10 NOTE — PATIENT INSTRUCTIONS
Ultrasound images reviewed today and near resolution of left ovarian cyst noted.  Reassurance given.  Also discussed likely nabothian cyst at apex of cervix and benign nature of this finding.   Will follow up with me for routine yearly exam in July.

## 2019-05-16 NOTE — TELEPHONE ENCOUNTER
Our Rheumatologist has carefully reviewed the patient's records and has determined that the patient does not meet the criteria for an appointment. Our practice currently consists of six physicians who are responsible for teaching and research commitments as well as patient care and we do not have the capacity to see all patients that are referred to us. In order to provide high quality care to our current patients, we limit new patients according to the likelihood that we would be able to provide additional tests or treatment options that would be of benefit.     It is the recommendation of the Rheumatologist that the patient would be better served by a community Rheumatologist where they may be able to get in to see someone sooner. While the clinic does not make recommendations about other Rheumatology clinics that the patient could go, there are a number of community clinics out there. Patient should consult with their primary care provider for recommendations.  A letter explaining this information has been sent to the referring provider.  Linette Senior CMA  5/16/2019 11:37 AM

## 2019-05-20 ENCOUNTER — MYC MEDICAL ADVICE (OUTPATIENT)
Dept: FAMILY MEDICINE | Facility: CLINIC | Age: 49
End: 2019-05-20

## 2019-05-20 NOTE — TELEPHONE ENCOUNTER
Routing to PCP for further review/recommendations/orders.    Sharon Davila RN  [facility]

## 2019-06-06 ENCOUNTER — OFFICE VISIT (OUTPATIENT)
Dept: SURGERY | Facility: CLINIC | Age: 49
End: 2019-06-06
Attending: FAMILY MEDICINE
Payer: COMMERCIAL

## 2019-06-06 VITALS
DIASTOLIC BLOOD PRESSURE: 72 MMHG | WEIGHT: 232.6 LBS | HEART RATE: 85 BPM | OXYGEN SATURATION: 95 % | BODY MASS INDEX: 41.21 KG/M2 | HEIGHT: 63 IN | SYSTOLIC BLOOD PRESSURE: 118 MMHG

## 2019-06-06 DIAGNOSIS — E66.01 MORBID OBESITY (H): Primary | ICD-10-CM

## 2019-06-06 DIAGNOSIS — I10 HYPERTENSION GOAL BP (BLOOD PRESSURE) < 130/80: ICD-10-CM

## 2019-06-06 PROCEDURE — 99244 OFF/OP CNSLTJ NEW/EST MOD 40: CPT | Performed by: FAMILY MEDICINE

## 2019-06-06 RX ORDER — PHENTERMINE HYDROCHLORIDE 37.5 MG/1
TABLET ORAL
Qty: 90 TABLET | Refills: 0 | Status: SHIPPED | OUTPATIENT
Start: 2019-06-06 | End: 2019-07-23

## 2019-06-06 ASSESSMENT — MIFFLIN-ST. JEOR: SCORE: 1649.2

## 2019-06-06 NOTE — Clinical Note
Dear Prudencio Conde,Thank you for referring Candie for medical bariatric consultation. I have enclosed my office note for your review. Please contact me if you have any questions or concerns. Thank you,RAVEN Morrell MD

## 2019-06-06 NOTE — PATIENT INSTRUCTIONS

## 2019-06-06 NOTE — PROGRESS NOTES
"    New Medical Weight Management Consult    PATIENT:  Christine Dubose  MRN:         [id number]  :         1970  [place]:         2019    Dear Amaya, Prudencio GRIFFIN,    I had the pleasure of seeing your patient, Christine Dubose.  Full intake/assessment done to determine barriers to weight loss success and develop a treatment plan.  Christine Dubose is a 49 year old female interested in treatment of medical problems associated with weight.  Her weight today is 232 lbs 9.6 oz, Body mass index is 41.2 kg/m ., and she has the following co-morbidities:     2019   I have the following co-morbidities associated with obesity: High Blood Pressure       Patient Goals Reviewed With Patient 2019   I am interested in attaining a healthier weight to diminish current health problems related to co-morbid conditions: Yes   I am interested in attaining a healthier weight in order to prevent future health problems: Yes       Referring Provider 2019   Please name the provider who referred you to Medical Weight Management.  If you do not know, please answer: \"I Don't Know\". Prudencio Conde       Wt Readings from Last 4 Encounters:   19 232 lb 9.6 oz (105.5 kg)   05/10/19 235 lb (106.6 kg)   19 235 lb (106.6 kg)   19 237 lb (107.5 kg)       Weight History Reviewed With Patient 2019   How concerned are you about your weight? Very Concerned   Would you describe your weight gain as gradual? Yes   I became overweight: After Pregnancy   The following factors have contributed to my weight gain:  Eating Wrong Types of Food, Eating Too Much, Lack of Exercise   I have tried the following methods to lose weight: Watching Portions or Calories   I have the following family history of obesity/being overweight:  I am the only one in my immediate family who is overweight   Has anyone in your family had weight loss surgery? No       Diet Recall Reviewed With Patient 2019   How many glasses of juice do you drink in " a typical day? 1 glass 3 days per week   How many of glasses of milk do you drink in a typical day? 1 at dinner   How many 8oz glasses of sugar containing drinks such as Sonido-Aid/sweet tea do you drink in a day? 0   How many cans/bottles of sugar pop/soda/tea/sports drinks do you drink in a day? 0   How many cans/bottles of diet pop/soda/tea or sports drink do you drink in a day? 1   How often do you have a drink of alcohol? Monthly or Less   If you do drink, how many drinks might you have in a day? 1 or 2       Eating Habits Reviewed With Patient 6/5/2019   Generally, my meals include foods like these: bread, pasta, rice, potatoes, corn, crackers, sweet dessert, pop, or juice. A Few Times a Week   Generally, my meals include foods like these: fried meats, brats, burgers, french fries, pizza, cheese, chips, or ice cream. A Few Times a Week   Eat fast food (like [employer], [employer], [employer]). Never   Eat at a buffet or sit-down restaurant. Never   Eat most of my meals in front of the TV or computer. A Few Times a Week   Often skip meals, eat at random times, have no regular eating times. Half of the Week   Rarely sit down for a meal but snack or graze throughout.  Never   Eat extra snacks between meals. A Few Times a Week   Eat most of my food at the end of the day. Never   Eat in the middle of the night or wake up at night to eat. Never   Eat extra snacks to prevent or correct low blood sugar. Never   Eat to prevent acid reflux or stomach pain. Never   Worry about not having enough food to eat. Never   Have you been to the food shelf at least a few times this year? No   I eat when I am depressed, stressed, anxious, or bored. Never   I eat when I am happy or as a reward. Never   I feel hungry all the time even if I just have eaten. A Few Times a Week   Feeling full is important to me. Never   Once I start eating, it is hard to stop. Never   I finish all the food on my plate even if I am already full. A Few Times  a Week   I can't resist eating delicious food or walk past the good food/smell. Never   I eat/snack without noticing that I am eating. Never   I eat when I am preparing the meal. Never   I eat more than usual when I see others eating. Never   I have trouble not eating sweets, ice cream, cookies, or chips if they are around the house. Half of the Week   I think about food all day. Never   What foods, if any, do you crave? Sweets/Candy/Chocolate   I feel out of control when eating. Never   I eat a large amount of food, like a loaf of bread, a box of cookies, a pint/quart of ice cream, all at once. Never   I eat a large amount of food even when I am not hungry. Never   I eat rapidly. Never   I eat alone because I feel embarrassed and do not want others to see how much I have eaten. Never   I eat until I am uncomfortably full. Never   I feel bad, disgusted, or guilty after I overeat. Monthly   I make myself vomit what I have eaten or use laxatives to get rid of food. Never     B: cereal (mini wheats or life)  L: skips  D: (4:00) chicken or tacos or turkey, never eats vegetables except caesar salad, often craves ice cream after dinner  Snacks: protein bar    Activity/Exercise History Reviewed With Patient 6/5/2019   How much of a typical 12 hour day do you spend sitting? Less Than Half the Day   How much of a typical 12 hour day do you spend lying down? Less Than Half the Day   How much of a typical day do you spend walking/standing? Less Than Half the Day   How many hours (not including work) do you spend on the TV/Video Games/Computer/Tablet/Phone? 1 Hour or Less   How many times a week are you active for the purpose of exercise? Once a Week   How many total minutes do you spend doing some activity for the purpose of exercising when you exercise? Less Than 15 Minutes   What keeps you from being more active? Lack of Time, Too tired, out of habit, pain       PAST MEDICAL HISTORY:  Past Medical History:   Diagnosis Date      Arthritis      Chronic pain     CHRONIC NECK PAIN     Depression 2005    on Lexapro     Dysfunctional uterine bleeding      Heart murmur      Hypertension      Menorrhagia      Miscarriage 11/2001     MVP (mitral valve prolapse)     Needs antibiotics for procedures     Other chronic pain      Other forms of migraine, without mention of intractable migraine without mention of status migrainosus     takes Zomig     Ovarian cyst 03/31/2019    found in ER, L side     Seizures (H)     Had seizure in 2006. Was on Tegretol, but discontinued the meds on her own.Sees Dr. Gume Morgan at [facility].Was started on Depakote in 2008 for migrines in addition to Zomig     Stress incontinence, female 12/2009    Dr. Kurtz-Cystocoele repair and pubovag slling     Thyroid disease     on synthroid-Hashimotos thyroiditis; sees Lorenza       Work/Social History Reviewed With Patient 6/5/2019   My employment status is: Stay at Home Parent   How much of your job is spent on the computer or phone? Less Than 50%   What is your marital status? /In a Relationship   If in a relationship, is your significant other overweight? Yes   Do you have children? Yes   If you have children, are they overweight? No         Mental Health History Reviewed With Patient 6/5/2019   Have you ever been physically or sexually abused? No   How often in the past 2 weeks have you felt little interest or pleasure in doing things? For Several Days   Over the past 2 weeks how often have you felt down, depressed, or hopeless? Not at all       Sleep History Reviewed With Patient 6/5/2019   How many hours do you sleep at night? 8   Do you think that you snore loudly or has anybody ever heard you snore loudly (louder than talking or so loud it can be heard behind a shut door)? No   Has anyone seen or heard you stop breathing during your sleep? No   Do you often feel tired, fatigued, or sleepy during the day? Yes       MEDICATIONS:   Current  Outpatient Medications   Medication Sig Dispense Refill     Calcium-Magnesium-Zinc 333-133-5 MG TABS Take 1 tablet by mouth At Bedtime       escitalopram (LEXAPRO) 20 MG tablet Take 1 tablet (20 mg) by mouth daily 90 tablet 1     IRON-FOLIC ACID PO        KRILL OIL PO Take 1 capsule by mouth every evening        levothyroxine (SYNTHROID/LEVOTHROID) 137 MCG tablet Take 1 tablet (137 mcg) by mouth daily 90 tablet 3     lisinopril (PRINIVIL/ZESTRIL) 10 MG tablet Take 1 tablet (10 mg) by mouth daily 90 tablet 3     NIFEdipine ER OSMOTIC (PROCARDIA XL) 60 MG 24 hr tablet Take 1 tablet (60 mg) by mouth daily 90 tablet 3     olopatadine (PATANOL) 0.1 % ophthalmic solution Place 1 drop into both eyes 2 times daily 1 Bottle 3     Pediatric Multivit-Minerals-C (MULTIVITAMIN GUMMIES CHILDRENS PO) Take 2 chew tab by mouth daily as needed       Thiamine HCl (VITAMIN B-1 PO)          ALLERGIES:   Allergies   Allergen Reactions     Imitrex [Sumatriptan] Other (See Comments)     Throat tightness     ROS  General  Fatigue: yes  HEENT  Hx of glaucoma: no  Vision changes: no  Cardiovascular  Chest Pain with Exertion: no  Palpitations: no  Hx of heart disease: MVP  Pulmonary  Shortness of breath at rest: no  Shortness of breath with exertion: no  Stop-bang score: 3  [place] Score: 13  Gastrointestinal  Heartburn: no  Psychiatric  Moods Stable: a little down  Endocrine  Polydipsia: no  Polyuria: no  Neurologic:  Hx of seizures: yes  Migraine headaches: yes    Physical Exam:    GEN: Alert and oriented in no acute distress.   HEENT: PERRLA, mucous membranes moist. Airway adequate  NECK: Supple without LAD or thyromegaly. Carotid bruits absent.  LUNGS: CTA without wheezes or crackles, good air movement throughout  CV: RRR no MRG  ABDOMEN: moderate protuberance, BS normal, non tender to palpation, no rebound or guarding, no HSM  SKIN: no rashes, few skin tags, no acanthosis nigrans  EXTREMITIES: no edema    ASSESSMENT/ PLAN    1. Morbid  obesity  We discussed healthy habits to assist with weight loss. She will work on eating 3 small meals per day and will try eating some vegetables. We should consider doing a sleep study. We discussed medication that may assist with weight loss. Phentermine was prescribed. Risks/ benefits and possible side effects were discussed and questions were answered. Written information was given.     2. Hypertension  Well controlled on current medication.    RTC:    4 weeks to meet with our dietician    TIME: 60 min spent on evaluation, management, counseling, education, & motivational interviewing with greater than 50 % of the total time was spent on counseling and coordinating care    Sincerely,    RAVEN Morrell MD

## 2019-06-24 ENCOUNTER — MYC MEDICAL ADVICE (OUTPATIENT)
Dept: FAMILY MEDICINE | Facility: CLINIC | Age: 49
End: 2019-06-24

## 2019-07-01 ENCOUNTER — ALLIED HEALTH/NURSE VISIT (OUTPATIENT)
Dept: SURGERY | Facility: CLINIC | Age: 49
End: 2019-07-01
Payer: COMMERCIAL

## 2019-07-01 VITALS — HEIGHT: 66 IN | WEIGHT: 231.4 LBS | BODY MASS INDEX: 37.19 KG/M2

## 2019-07-01 PROCEDURE — 97802 MEDICAL NUTRITION INDIV IN: CPT | Performed by: DIETITIAN, REGISTERED

## 2019-07-01 ASSESSMENT — MIFFLIN-ST. JEOR: SCORE: 1683.43

## 2019-07-01 NOTE — PROGRESS NOTES
"MEDICAL WEIGHT LOSS INITIAL EVALUATION  DIAGNOSIS:  Class II Obesity    NUTRITION HISTORY:  Breakfast: Life or Frosted mini wheat's with skim milk, 4 oz 100% fruit juice @ within 1 hour or waking (9:00)  Lunch: skips 6 X per week or grilled chicken sandwich or hamburger,  mashed potatoes or omelette with toast (Sunday)  Dinner: chicken or meatloaf, rice or potatoes, fruit @4:00-5:00  Snacks: 2X per week-granola or protein bar-afternoon after dinner 2 chocolate chip cookies  Beverage choices: 8 oz water, 16 oz Ice water, ~ 8 oz skim milk, Diet soda when dining out, ETOH-1X every 2 months (2 drinks)  Dining out: generally 1X per week, brunch or lunch  Binge eating: denies  Emotional eating: denies  Night time eating: denies  Exercise: none (both knees replaced in 2015)  Other: Patient cooks and shops for her family of 5  (Kids -22, 19, 12 years old). Dislikes most veggies, but will eat- lettuce, raw spinach, corn, potatoes, sweet potatoes fries. [place] day trip starting tomorrow ([place]). History of OA reported in both knees. Stay at home parent.    MEDICATION FOR WEIGHT LOSS:  Phentermine-stopped ~ 1 week ago (thinks it was causing sleep problems)    ANTHROPOMETRICS:  Height: 5' 5.5\" (measured today)  Weight: 231.4 lb  BMI:  37.92 kg/m2  NUTRITION DIAGNOSIS:   Obese, class II related to excess energy intake as evidence by BMI of  37.92  NUTRITION INTERVENTIONS  Nutrition Prescription:  Recommend modified energy- nutrient intake  Implementation:  Nutrition Education (Content):    Discussed portion sizes and My plate guidelines    Determined options for exercise    Offered suggestions for protein at breakfast    Provided   Tips for Weight Loss and Weight Management\"    Nutrition Education (Application):     Patient to practice goals as stated below    Patient verbalizes understanding of diet by stating she knows she should exercise    Anticipate fair-good compliance    Goals:  Walk 2X per week for 20-30 minutes  Eat " lunch daily (offered suggestions)  Increase water intake to 48-64 oz per day  Replace dessert with a lower calorie option (ideas provided)    FOLLOW UP AND MONITORING:    Other  - follow up in 8 weeks.     TIME SPENT WITH PATIENT:   45 minutes   Arnulfo Michael RD, LD  [facility]  [phone]

## 2019-07-18 ENCOUNTER — TRANSFERRED RECORDS (OUTPATIENT)
Dept: HEALTH INFORMATION MANAGEMENT | Facility: CLINIC | Age: 49
End: 2019-07-18

## 2019-07-23 ENCOUNTER — OFFICE VISIT (OUTPATIENT)
Dept: OBGYN | Facility: CLINIC | Age: 49
End: 2019-07-23
Payer: COMMERCIAL

## 2019-07-23 ENCOUNTER — ANCILLARY PROCEDURE (OUTPATIENT)
Dept: MAMMOGRAPHY | Facility: CLINIC | Age: 49
End: 2019-07-23
Payer: COMMERCIAL

## 2019-07-23 VITALS
DIASTOLIC BLOOD PRESSURE: 80 MMHG | SYSTOLIC BLOOD PRESSURE: 128 MMHG | HEIGHT: 66 IN | BODY MASS INDEX: 37.28 KG/M2 | WEIGHT: 232 LBS

## 2019-07-23 DIAGNOSIS — Z12.31 VISIT FOR SCREENING MAMMOGRAM: ICD-10-CM

## 2019-07-23 DIAGNOSIS — Z01.419 ENCOUNTER FOR GYNECOLOGICAL EXAMINATION WITHOUT ABNORMAL FINDING: Primary | ICD-10-CM

## 2019-07-23 PROCEDURE — 77067 SCR MAMMO BI INCL CAD: CPT | Mod: TC

## 2019-07-23 PROCEDURE — 77063 BREAST TOMOSYNTHESIS BI: CPT | Mod: TC

## 2019-07-23 PROCEDURE — 99396 PREV VISIT EST AGE 40-64: CPT | Performed by: OBSTETRICS & GYNECOLOGY

## 2019-07-23 ASSESSMENT — ANXIETY QUESTIONNAIRES
7. FEELING AFRAID AS IF SOMETHING AWFUL MIGHT HAPPEN: NOT AT ALL
2. NOT BEING ABLE TO STOP OR CONTROL WORRYING: NOT AT ALL
GAD7 TOTAL SCORE: 0
5. BEING SO RESTLESS THAT IT IS HARD TO SIT STILL: NOT AT ALL
1. FEELING NERVOUS, ANXIOUS, OR ON EDGE: NOT AT ALL
6. BECOMING EASILY ANNOYED OR IRRITABLE: NOT AT ALL
3. WORRYING TOO MUCH ABOUT DIFFERENT THINGS: NOT AT ALL
IF YOU CHECKED OFF ANY PROBLEMS ON THIS QUESTIONNAIRE, HOW DIFFICULT HAVE THESE PROBLEMS MADE IT FOR YOU TO DO YOUR WORK, TAKE CARE OF THINGS AT HOME, OR GET ALONG WITH OTHER PEOPLE: NOT DIFFICULT AT ALL

## 2019-07-23 ASSESSMENT — PATIENT HEALTH QUESTIONNAIRE - PHQ9
5. POOR APPETITE OR OVEREATING: NOT AT ALL
SUM OF ALL RESPONSES TO PHQ QUESTIONS 1-9: 1

## 2019-07-23 ASSESSMENT — MIFFLIN-ST. JEOR: SCORE: 1694.1

## 2019-07-23 NOTE — PROGRESS NOTES
Christine is a 49 year old  female who presents for annual exam.     Besides routine health maintenance, she has no other health concerns today .    HPI:    Here today for yearly exam --doing well.  S/p LASH with KM in .  No vb/spotting.  Denies any signficant hot flushes or night sweats.  +SA --no issues/problems.  Denies bowel concerns.  S/p AP repair and midurethral sling in  --no issues with leaking or urgency.  Getting up once nightly to void.    ; 4 kids --11yo, 17yo at home, 17yo daughter who will be going away to college next month and 23yo son at home; worked as  this year but will not return; will be taking care of new 8mo puppy  -staying active; trying to work out consistently 2x/wk  +mammo today; +SBE --no issues  PCP -Dr. Conde; had normal fasting bloodwork with PCP earlier this year  -sees Dr. Ayala for thyroid issues  -will be due for first colonoscopy next year  -was seen in ED and in follow up in our office earlier this year for LLQ pain and left ovarian cyst --no issues since        GYNECOLOGIC HISTORY:    Patient's last menstrual period was 2014.  Her current contraception method is: hysterectomy.  She  reports that she has never smoked. She has never used smokeless tobacco.    Patient is sexually active.  STD testing offered?  Declined  Last PHQ-9 score on record =   PHQ-9 SCORE 2019   PHQ-9 Total Score -   PHQ-9 Total Score 1     Last GAD7 score on record =   JAYDE-7 SCORE 2019   Total Score -   Total Score 0     Alcohol Score = 1    HEALTH MAINTENANCE:  Cholesterol: (  Cholesterol   Date Value Ref Range Status   2019 199 <200 mg/dL Final   2018 211 (H) <200 mg/dL Final     Comment:     Desirable:       <200 mg/dl    5/3/19  Total= 199, Triglycerides=171, HDL=40, KTX=720, FBS=94, TSH=0.48  Last Mammo: one year ago, Result: normal, Next Mammo: today   Pap:   Lab Results   Component Value Date    PAP NIL 2017 WNL HPV  (-)neg  Colonoscopy:  NA, Result: not applicable, Next Colonoscopy: NA years.  Dexa:  NA    Health maintenance updated:  yes    HISTORY:  OB History    Para Term  AB Living   5 4 3 1 1 4   SAB TAB Ectopic Multiple Live Births   1 0 0 0 4      # Outcome Date GA Lbr Amol/2nd Weight Sex Delivery Anes PTL Lv   5 Term 07 37w0d  3.118 kg (6 lb 14 oz) M    SHAN      Birth Comments: was on tegretol during preg for seizure disorder      Name: Garrick   4 Term 02 37w0d  2.892 kg (6 lb 6 oz) M    SHAN      Name: Tone   3 Term 99 40w0d  3.714 kg (8 lb 3 oz) F    SHAN   2  97 35w0d   M -SEC   SHAN      Birth Comments: breech      Name: Giancarlo Vizcarra SAB                Patient Active Problem List   Diagnosis     CARDIOVASCULAR SCREENING; LDL GOAL LESS THAN 160     Hypertension goal BP (blood pressure) < 130/80     Migraine     Osteoarthritis     Knee joint replacement by other means     Abnormal gait     Low back pain     Synovial cyst     History of arthroplasty of left knee     Posttraumatic stress disorder     Hypothyroidism, unspecified type     Arthritis of right hand     Arthralgia of right acromioclavicular joint     Morbid obesity (H)     Anxiety     Left ovarian cyst     Past Surgical History:   Procedure Laterality Date     ARTHROPLASTY KNEE UNICOMPARTMENT Right 2015    Procedure: ARTHROPLASTY KNEE UNICOMPARTMENT;  Surgeon: Pablo Lara MD;  Location:  OR     ARTHROPLASTY KNEE UNICOMPARTMENT Left 10/27/2015    Procedure: ARTHROPLASTY KNEE UNICOMPARTMENT;  Surgeon: Pablo Lara MD;  Location:  OR      SECTION  1997    for breech     CYCTOCELE REPAIR       CYSTOSCOPY  2014    Procedure: CYSTOSCOPY;;  Surgeon: Candie Crandall MD;  Location: [facility]     DECOMPRESSION LUMBAR ONE LEVEL Right 2015    Procedure: DECOMPRESSION LUMBAR ONE LEVEL;  Surgeon: Elio Starr MD;  Location:  OR     DILATION AND CURETTAGE,  OPERATIVE HYSTEROSCOPY, COMBINED  3/8/2012    Procedure:COMBINED DILATION AND CURETTAGE, OPERATIVE HYSTEROSCOPY; OPERATIVE HYSTEROSCOPY, DILATION AND CURETTAGE, POSSIBLE RESECTION OF POLYP; Surgeon:CANDIE CRANDALL; Location:[facility]     ENT SURGERY      TONSILLECTOMY     GENITOURINARY SURGERY      BLADDER SLING     LAPAROSCOPIC HYSTERECTOMY SUPRACERVICAL  4/9/2014    Procedure: LAPAROSCOPIC HYSTERECTOMY SUPRACERVICAL;  LAPAROSCOPIC ASSISTED SUPRACERVICAL HYSTERECTOMY, cystoscopy;  Surgeon: Candie Crandall MD;  Location: [facility]     STRIP VEIN  11/2010     TONSILLECTOMY  1974      Social History     Tobacco Use     Smoking status: Never Smoker     Smokeless tobacco: Never Used   Substance Use Topics     Alcohol use: Yes     Alcohol/week: 0.0 oz     Comment: Socially      Problem (# of Occurrences) Relation (Name,Age of Onset)    Cerebrovascular Disease (1) Paternal Grandmother: TIA    Diabetes (4) Maternal Grandmother (Amy), Maternal Grandfather (Willis), Paternal Grandmother, Paternal Grandfather    Fractures (1) Maternal Grandmother (Amy): Hip    Hyperlipidemia (2) Mother (Raiza): high triglycerides, Father (Remy): not on meds    Hypertension (4) Mother (Raiza), Father (Remy), Maternal Grandmother (Amy), Paternal Grandmother    Hypothyroidism (1) Daughter    Lung Cancer (1) Maternal Grandfather (Willis, 68): smoker    No Known Problems (3) Son, Son, Son    Osteoporosis (1) Maternal Grandmother (Amy)    Stomach Cancer (1) Paternal Grandfather (68)    Thyroid Disease (1) Mother (Raiza)       Negative family history of: Coronary Artery Disease, Breast Cancer, Colon Cancer            Current Outpatient Medications   Medication Sig     Calcium-Magnesium-Zinc 333-133-5 MG TABS Take 1 tablet by mouth At Bedtime     escitalopram (LEXAPRO) 20 MG tablet Take 1 tablet (20 mg) by mouth daily     IRON-FOLIC ACID PO      KRILL OIL PO Take 1 capsule by mouth every evening       "levothyroxine (SYNTHROID/LEVOTHROID) 137 MCG tablet Take 1 tablet (137 mcg) by mouth daily     NIFEdipine ER OSMOTIC (PROCARDIA XL) 60 MG 24 hr tablet Take 1 tablet (60 mg) by mouth daily     olopatadine (PATANOL) 0.1 % ophthalmic solution Place 1 drop into both eyes 2 times daily     Pediatric Multivit-Minerals-C (MULTIVITAMIN GUMMIES CHILDRENS PO) Take 2 chew tab by mouth daily as needed     Thiamine HCl (VITAMIN B-1 PO)      lisinopril (PRINIVIL/ZESTRIL) 10 MG tablet Take 1 tablet (10 mg) by mouth daily     No current facility-administered medications for this visit.      Allergies   Allergen Reactions     Imitrex [Sumatriptan] Other (See Comments)     Throat tightness       Past medical, surgical, social and family histories were reviewed and updated in EPIC.    ROS:   12 point review of systems negative other than symptoms noted below.  Constitutional: Fatigue  Cardiovascular: Lower Extremity Swelling  Musculoskeletal: Joint Pain    EXAM:  /80   Ht 1.676 m (5' 6\")   Wt 105.2 kg (232 lb)   LMP 03/17/2014   Breastfeeding? No   BMI 37.45 kg/m     BMI: Body mass index is 37.45 kg/m .    PHYSICAL EXAM:  Constitutional:  Appearance: Well nourished, well developed, alert, in no acute distress  Neck:  Lymph Nodes:  No lymphadenopathy present    Thyroid:  Gland size normal, nontender, no nodules or masses present  on palpation  Chest:  Respiratory Effort:  Breathing unlabored  Cardiovascular:    Heart: Auscultation:  Regular rate, normal rhythm, no murmurs present  Breasts: Inspection of Breasts:  No lymphadenopathy present., Palpation of Breasts and Axillae:  No masses present on palpation, no breast tenderness., Axillary Lymph Nodes:  No lymphadenopathy present. and No nodularity, asymmetry or nipple discharge bilaterally.  Gastrointestinal:   Abdominal Examination:  Abdomen nontender to palpation, tone normal without rigidity or guarding, no masses present, umbilicus without lesions   Liver and Spleen:  No " hepatomegaly present, liver nontender to palpation    Hernias:  No hernias present  Lymphatic: Lymph Nodes:  No other lymphadenopathy present  Skin:  General Inspection:  No rashes present, no lesions present, no areas of  discoloration    Genitalia and Groin:  No rashes present, no lesions present, no areas of  discoloration, no masses present  Neurologic/Psychiatric:    Mental Status:  Oriented X3     Pelvic Exam:  External Genitalia:     Normal appearance for age, no discharge present, no tenderness present, no inflammatory lesions present, color normal  Vagina:     Normal vaginal vault without central or paravaginal defects, no discharge present, no inflammatory lesions present, no masses present  Bladder:     Nontender to palpation  Urethra:   Urethral Body:  Urethra palpation normal, urethra structural support normal   Urethral Meatus:  No erythema or lesions present  Cervix:     Appearance healthy, no lesions present, nontender to palpation, no bleeding present  Uterus:     Surgically absent  Adnexa:     No adnexal tenderness present, no adnexal masses present  Perineum:     Perineum within normal limits, no evidence of trauma, no rashes or skin lesions present  Anus:     Anus within normal limits, no hemorrhoids present  Inguinal Lymph Nodes:     No lymphadenopathy present  Pubic Hair:     Normal pubic hair distribution for age  Genitalia and Groin:     No rashes present, no lesions present, no areas of discoloration, no masses present      COUNSELING:   Reviewed preventive health counseling, as reflected in patient instructions  Special attention given to:        Regular exercise       Healthy diet/nutrition       Colon cancer screening       (Amanda)menopause management    BMI: Body mass index is 37.45 kg/m .  Weight management plan: Discussed healthy diet and exercise guidelines Patient was referred to their PCP to discuss a diet and exercise plan.    ASSESSMENT:  49 year old female with satisfactory annual  exam.    ICD-10-CM    1. Encounter for gynecological examination without abnormal finding Z01.419        PLAN:  Patient Instructions   Follow up with your primary care provider for your other medical problems.  Continue self breast exam.  Increase physical activity and exercise.  Usual safety and preventative measures counseling done.  BMI >25  Weight loss encouraged.  Last pap smear (2017) was normal and negative for the DNA of high risk HPV subtypes.  No pap was obtained this year.  This was discussed with the patient and she agrees with the plan.      Christiana Andersen MD

## 2019-07-24 ASSESSMENT — ANXIETY QUESTIONNAIRES: GAD7 TOTAL SCORE: 0

## 2019-08-29 ENCOUNTER — OFFICE VISIT (OUTPATIENT)
Dept: SURGERY | Facility: CLINIC | Age: 49
End: 2019-08-29
Payer: COMMERCIAL

## 2019-08-29 VITALS
HEART RATE: 81 BPM | OXYGEN SATURATION: 95 % | BODY MASS INDEX: 37.69 KG/M2 | WEIGHT: 234.5 LBS | HEIGHT: 66 IN | SYSTOLIC BLOOD PRESSURE: 122 MMHG | DIASTOLIC BLOOD PRESSURE: 71 MMHG

## 2019-08-29 DIAGNOSIS — E66.01 MORBID OBESITY (H): Primary | ICD-10-CM

## 2019-08-29 DIAGNOSIS — I10 HYPERTENSION GOAL BP (BLOOD PRESSURE) < 130/80: ICD-10-CM

## 2019-08-29 DIAGNOSIS — Z86.69 HISTORY OF MIGRAINE HEADACHES: ICD-10-CM

## 2019-08-29 PROCEDURE — 99214 OFFICE O/P EST MOD 30 MIN: CPT | Performed by: FAMILY MEDICINE

## 2019-08-29 RX ORDER — TOPIRAMATE 50 MG/1
TABLET, FILM COATED ORAL
Qty: 90 TABLET | Refills: 1 | Status: SHIPPED | OUTPATIENT
Start: 2019-08-29 | End: 2020-06-24

## 2019-08-29 ASSESSMENT — MIFFLIN-ST. JEOR: SCORE: 1705.44

## 2019-08-29 NOTE — PROGRESS NOTES
[facility] Non Surgical Follow up Visit   Date of visit: 8/29/2019  Physician: RAVEN Morrell MD, MD  Primary Care is Prudencio CondeErnesto Dubose   49 year old  female     Initial Weight: 232#  Initial BMI: 41.2  Today's Weight:   Wt Readings from Last 1 Encounters:   08/29/19 234 lb 8 oz (106.4 kg)     Body mass index is 37.85 kg/m .  Wt change since last visit (lbs): 3.1  Cumulative weight loss (lbs): -1.9     Assessment and Plan   Assessment: Christine is a 49 year old year old female who presents for medical weight management.      Plan:     Diagnosis Comments   1. Morbid obesity (H)  Patient was congratulated on the healthy changes she has thus far. Healthy habits to assist with further weight loss were discussed. She set a goal to do 15 minutes of walking 2 days per week. We will start topamax to try to help with her evening cravings.  Risks, benefits and possible side effects were discussed   2. History of migraine headaches Topamax can also help prevent these       Follow up in 1 month with the dietician and in 2 months with myself           INTERIM HISTORY  Patient started the phentermine but didn't sleep well while taking it. She is struggling with craving sweets at night. She feels she is very busy and this is interfering with her weight loss efforts.    DIETARY HISTORY  Meals Per Day: 2-3  Eating Protein First?: sometimes  Food Diary: B:Roberth Claros light breakfast sandwich L:handful of nuts D:cut out bread with the meal, meat, occasional salad  Snacks Per Day: after dinner, craves dessert  Typical Snack: ice cream  Fluid Intake: 64 oz  Portion Control: working on it  Calorie Containing Beverages: one glass of milk with dinner  Typical Protein Food Choices: meat, turkey sausage, nuts  Choosing Whole Grains: sometimes  Meals at Restaurant per week:2    Positive Changes Since Last Visit: cut out juice  Struggling With: hates vegetables, exercise    Knowledgeable in Reading Food Labels:  not sure  Getting Adequate Protein: sometimes  Sleeping 7-8 hours/day yes  Stress management : discussed trying walking    PHYSICAL ACTIVITY PATTERNS:  Cardiovascular: ADLs  Strength Training: ADLs    REVIEW OF SYSTEMS  GENERAL/CONSTITUTIONAL:  Fatigue: yes  HEENT:  Vision changes, glaucoma: no  CARDIOVASCULAR:  Chest Pain with Exertion: no  PULMONARY:  Dyspnea on exertion: yes  NEUROLOGIC:  Paresthesias: no  PSYCHIATRIC:  Moods: stable         Patient Profile   Social History     Social History Narrative     Not on file        Past Medical History   Past Medical History:   Diagnosis Date     Arthritis      Chronic pain     CHRONIC NECK PAIN     Depression 2005    on Lexapro     Dysfunctional uterine bleeding      Heart murmur      Hypertension      Menorrhagia      Miscarriage 11/2001     MVP (mitral valve prolapse)     Needs antibiotics for procedures     Other chronic pain      Other forms of migraine, without mention of intractable migraine without mention of status migrainosus     takes Zomig     Ovarian cyst 03/31/2019    found in ER, L side     Seizures (H)     Had seizure in 2006. Was on Tegretol, but discontinued the meds on her own.Sees Dr. Gume Morgan at [facility].Was started on Depakote in 2008 for migrines in addition to Zomig     Stress incontinence, female 12/2009    Dr. Kurtz-Cystocoele repair and pubovag slling     Thyroid disease     on synthroid-Hashimotos thyroiditis; sees Lorenza     Patient Active Problem List   Diagnosis     CARDIOVASCULAR SCREENING; LDL GOAL LESS THAN 160     Hypertension goal BP (blood pressure) < 130/80     Migraine     Osteoarthritis     Knee joint replacement by other means     Abnormal gait     Low back pain     Synovial cyst     History of arthroplasty of left knee     Posttraumatic stress disorder     Hypothyroidism, unspecified type     Arthritis of right hand     Arthralgia of right acromioclavicular joint     Morbid obesity (H)     Anxiety      "Left ovarian cyst     [unfilled]    Past Surgical History  She has a past surgical history that includes CYCTOCELE REPAIR; ENT surgery; Abdomen surgery; Cystoscopy (2014);  section (1997); Dilation and curettage, operative hysteroscopy, combined (3/8/2012); Laparoscopic hysterectomy supracervical (2014); Arthroplasty knee unicompartment (Right, 2015); Decompression lumbar one level (Right, 2015); Arthroplasty knee unicompartment (Left, 10/27/2015); tonsillectomy (); and Strip vein (2010).     Examination   /71 (BP Location: Left arm, Patient Position: Sitting, Cuff Size: Adult Large)   Pulse 81   Ht 5' 6\" (1.676 m)   Wt 234 lb 8 oz (106.4 kg)   LMP 2014   SpO2 95%   BMI 37.85 kg/m    [unfilled]  General:  Alert and ambulatory, NAD  HEENT:  No conjunctival pallor, moist mucous Membranes, neck is without LAD  Pulmonary:  Normal respiratory effort, no cough, no audible wheezes/crackles.  CV:  Regular rate and Rhythm, no murmurs  Abdominal: BS normal,soft, NT without rebound or guarding  Pscyh/Mood: stable         Counseling:   We reviewed the important post op bariatric recommendations:  -eating 3 meals daily  -eating protein first, getting >60gm protein daily  -eating slowly, chewing food well  -avoiding/limiting calorie containing beverages  -limiting starchy vegetables and carbohydrates, choosing wheat, not white with breads,   crackers, pastas, sil, bagels, tortillas, rice  -limiting restaurant or cafeteria eating to twice a week or less    We discussed the importance of restorative sleep and stress management in maintaining a healthy weight.  We discussed the National Weight Control Registry healthy weight maintenance strategies and ways to optimize metabolism.  We discussed the importance of physical activity including cardiovascular and strength training in maintaining a healthier weight.    > 25 min spent with patient, > 50% spent in counseling "         RAVEN Morrell MD  [facility]

## 2019-11-02 ENCOUNTER — HEALTH MAINTENANCE LETTER (OUTPATIENT)
Age: 49
End: 2019-11-02

## 2019-12-30 ENCOUNTER — TRANSFERRED RECORDS (OUTPATIENT)
Dept: HEALTH INFORMATION MANAGEMENT | Facility: CLINIC | Age: 49
End: 2019-12-30

## 2020-01-24 ENCOUNTER — TELEPHONE (OUTPATIENT)
Dept: FAMILY MEDICINE | Facility: CLINIC | Age: 50
End: 2020-01-24

## 2020-01-24 NOTE — TELEPHONE ENCOUNTER
Pt called to triage sypmtoms, Pt stated she is feeling tired, and lack of energy. Pt denies wieght gain, hair loss, palpitations, SOB, numbness/tingling.   Pt has not check TSH level since 5/2019    Ok for acuity of appt.    Kelechi Cantu RN   [facility] [place] Triage

## 2020-01-30 ENCOUNTER — OFFICE VISIT (OUTPATIENT)
Dept: FAMILY MEDICINE | Facility: CLINIC | Age: 50
End: 2020-01-30
Payer: COMMERCIAL

## 2020-01-30 VITALS
SYSTOLIC BLOOD PRESSURE: 130 MMHG | TEMPERATURE: 98.1 F | HEIGHT: 66 IN | OXYGEN SATURATION: 99 % | HEART RATE: 79 BPM | WEIGHT: 235 LBS | DIASTOLIC BLOOD PRESSURE: 70 MMHG | BODY MASS INDEX: 37.77 KG/M2

## 2020-01-30 DIAGNOSIS — R40.0 HAS DAYTIME DROWSINESS: ICD-10-CM

## 2020-01-30 DIAGNOSIS — R06.83 SNORING: ICD-10-CM

## 2020-01-30 DIAGNOSIS — E03.9 HYPOTHYROIDISM, UNSPECIFIED TYPE: Primary | ICD-10-CM

## 2020-01-30 DIAGNOSIS — R53.83 FATIGUE, UNSPECIFIED TYPE: ICD-10-CM

## 2020-01-30 PROCEDURE — 84443 ASSAY THYROID STIM HORMONE: CPT | Performed by: FAMILY MEDICINE

## 2020-01-30 PROCEDURE — 99214 OFFICE O/P EST MOD 30 MIN: CPT | Performed by: FAMILY MEDICINE

## 2020-01-30 PROCEDURE — 84481 FREE ASSAY (FT-3): CPT | Performed by: FAMILY MEDICINE

## 2020-01-30 PROCEDURE — 82306 VITAMIN D 25 HYDROXY: CPT | Performed by: FAMILY MEDICINE

## 2020-01-30 PROCEDURE — 36415 COLL VENOUS BLD VENIPUNCTURE: CPT | Performed by: FAMILY MEDICINE

## 2020-01-30 ASSESSMENT — MIFFLIN-ST. JEOR: SCORE: 1707.7

## 2020-01-30 NOTE — PROGRESS NOTES
"Subjective   Christine Dubose is a 49 year old female who presents to clinic today for the following health issues:    HPI   Fatigue  2 months - pt sleeps fine- stays asleep- no trouble falling asleep. She cannot get anything done because she is tired. When she wakes up she feels tired. She takes naps. She has not felt sick lately.     Hypothyroidism  She felt the same way when she was first diagnosed.     Reviewed and updated as needed this visit by provider:  Tobacco  Allergies  Meds  Problems  Med Hx  Surg Hx  Fam Hx         Review of Systems   Constitutional, HEENT, cardiovascular, pulmonary, GI, , musculoskeletal, neuro, skin, endocrine and psych systems are negative, except as otherwise noted.    This document serves as a record of the services and decisions personally performed and made by Prudencio Conde MD. It was created on his behalf by Darrel Horner, a trained medical scribe. The creation of this document is based the provider's statements to the medical scribe.  Scribe Darrel Horner 3:02 PM, January 30, 2020        Objective   /70   Pulse 79   Temp 98.1  F (36.7  C) (Oral)   Ht 1.676 m (5' 6\")   Wt 106.6 kg (235 lb)   LMP 03/17/2014   SpO2 99%   BMI 37.93 kg/m   Body mass index is 37.93 kg/m .  Physical Exam   GENERAL: healthy, alert, well nourished, well hydrated, no distress  EYES: Eyes grossly normal to inspection, extraocular movements - intact, and PERRL  HENT: ear canals- normal; TMs- normal; Nose- normal; Mouth- no ulcers, no lesions  NECK: no tenderness, no adenopathy, no asymmetry, no masses, no stiffness; thyroid- normal to palpation  RESP: lungs clear to auscultation - no rales, no rhonchi, no wheezes  CV: regular rates and rhythm, normal S1 S2, no S3 or S4 and no murmur, no click or rub -  ABDOMEN: soft, no tenderness, no  hepatosplenomegaly, no masses, normal bowel sounds  MS: extremities- no gross deformities noted, no edema  NEURO: strength and tone- normal, sensory " "exam- grossly normal, mentation- intact, speech- normal, reflexes- symmetric  PSYCH: Alert and oriented times 3; speech- coherent , normal rate and volume; able to articulate logical thoughts, able to abstract reason, no tangential thoughts, no hallucinations or delusions, affect- normal  LYMPHATICS: ant. cervical- normal, post. cervical- normal, axillary- normal, supraclavicular- normal, inguinal- normal        Assessment & Plan   Christine was seen today for fatigue.    Diagnoses and all orders for this visit:    Hypothyroidism, unspecified type - 2 month onset. She feels tired and similar to when she first got diagnosed. Labs pending today.   -     TSH with free T4 reflex  -     T3, Free    Fatigue, unspecified type - 2 month onset. Sleep study referral given.   -     Vitamin D Deficiency  -     TSH with free T4 reflex  -     T3, Free  -     SLEEP EVALUATION & MANAGEMENT REFERRAL - [facility]  [phone] (Age 18 and up); Future    Snoring / Has daytime drowsiness - sleep study referral given today.   -     SLEEP EVALUATION & MANAGEMENT REFERRAL - [facility]  [phone] (Age 18 and up); Future      BMI:   Estimated body mass index is 37.85 kg/m  as calculated from the following:    Height as of 8/29/19: 1.676 m (5' 6\").    Weight as of 8/29/19: 106.4 kg (234 lb 8 oz).   Weight management plan: Discussed healthy diet and exercise guidelines    See Patient Instructions    Return in about 6 months (around 7/30/2020) for recheck.    The information in this document, created by the medical scribe for me, accurately reflects the services I personally performed and the decisions made by me. I have reviewed and approved this document for accuracy prior to leaving the patient care area.  3:15 PM, 01/30/20        Sonny Conde MD      [address] [address]  [email or web address] "   Office: [phone]  Pager: [phone]

## 2020-01-31 LAB
DEPRECATED CALCIDIOL+CALCIFEROL SERPL-MC: 23 UG/L (ref 20–75)
T3FREE SERPL-MCNC: 2.4 PG/ML (ref 2.3–4.2)
TSH SERPL DL<=0.005 MIU/L-ACNC: 0.44 MU/L (ref 0.4–4)

## 2020-02-02 NOTE — RESULT ENCOUNTER NOTE
Dear Candie,    Here is a summary of your recent test results:  -TSH (thyroid stimulating hormone) level is normal which indicates appropriate thyroid replacement dosing.  ADVISE: continuing same replacement dose and rechecking this in 12 months.  -Vitamin D level is borderline low and oral supplementation should be started.  ADVISE: starting over the counter Vitamin D3  2000 IU - 3 tabs (6000 IU) daily for 6 weeks and then 2000 IU daily to maintain levels.  Then in 2 months, please schedule a lab only appointment to recheck your Vitamin D levels.           Thank you very much for trusting me and [facility].     Healthy regards,  Sonny Conde MD

## 2020-04-18 DIAGNOSIS — F41.9 ANXIETY: ICD-10-CM

## 2020-04-20 RX ORDER — ESCITALOPRAM OXALATE 20 MG/1
TABLET ORAL
Qty: 90 TABLET | Refills: 1 | Status: SHIPPED | OUTPATIENT
Start: 2020-04-20 | End: 2020-10-05

## 2020-04-20 NOTE — TELEPHONE ENCOUNTER
Prescription approved per [facility].    Kelechi Cantu RN   [facility] Triage      Kelechi Cantu RN   [facility] Triage

## 2020-06-23 NOTE — PROGRESS NOTES
SUBJECTIVE:                                                   Christine Dubose is a 50 year old female who presents to clinic today for the following health issue(s):  Patient presents with:  Pelvic Pain: c/o left sided pelvic pain x 5 days, hx of ovarian cyst      HPI: Patient is c/o pelvic pain, mostly left sided pain.  She has had LASH in .  She also has a history of ovarian cysts.        Patient's last menstrual period was 2014..     Patient is sexually active, .  Using hysterectomy for contraception.    reports that she has never smoked. She has never used smokeless tobacco.    STD testing offered?  Declined    Health maintenance updated:  yes    Problem list and histories reviewed & adjusted, as indicated.  Additional history: as documented.    Patient Active Problem List   Diagnosis     CARDIOVASCULAR SCREENING; LDL GOAL LESS THAN 160     Hypertension goal BP (blood pressure) < 130/80     Migraine     Osteoarthritis     Knee joint replacement by other means     Abnormal gait     Low back pain     Synovial cyst     History of arthroplasty of left knee     Posttraumatic stress disorder     Hypothyroidism, unspecified type     Arthritis of right hand     Arthralgia of right acromioclavicular joint     Morbid obesity (H)     Anxiety     Left ovarian cyst     Past Surgical History:   Procedure Laterality Date     ARTHROPLASTY KNEE UNICOMPARTMENT Right 2015    Procedure: ARTHROPLASTY KNEE UNICOMPARTMENT;  Surgeon: Pablo Lara MD;  Location:  OR     ARTHROPLASTY KNEE UNICOMPARTMENT Left 10/27/2015    Procedure: ARTHROPLASTY KNEE UNICOMPARTMENT;  Surgeon: Pablo Lara MD;  Location:  OR      SECTION  1997    for breech     CYCTOCELE REPAIR       CYSTOSCOPY  2014    Procedure: CYSTOSCOPY;;  Surgeon: Candie Crandall MD;  Location:  SD     DECOMPRESSION LUMBAR ONE LEVEL Right 2015    Procedure: DECOMPRESSION LUMBAR ONE LEVEL;  Surgeon: Ro  Elio Harrington MD;  Location:  OR     DILATION AND CURETTAGE, OPERATIVE HYSTEROSCOPY, COMBINED  3/8/2012    Procedure:COMBINED DILATION AND CURETTAGE, OPERATIVE HYSTEROSCOPY; OPERATIVE HYSTEROSCOPY, DILATION AND CURETTAGE, POSSIBLE RESECTION OF POLYP; Surgeon:CANDIE CRANDALL; Location:[facility]     ENT SURGERY      TONSILLECTOMY     GENITOURINARY SURGERY      BLADDER SLING     LAPAROSCOPIC HYSTERECTOMY SUPRACERVICAL  4/9/2014    Procedure: LAPAROSCOPIC HYSTERECTOMY SUPRACERVICAL;  LAPAROSCOPIC ASSISTED SUPRACERVICAL HYSTERECTOMY, cystoscopy;  Surgeon: Candie Crandall MD;  Location: [facility]     STRIP VEIN  11/2010     TONSILLECTOMY  1974      Social History     Tobacco Use     Smoking status: Never Smoker     Smokeless tobacco: Never Used   Substance Use Topics     Alcohol use: Yes     Alcohol/week: 0.0 standard drinks     Comment: Socially      Problem (# of Occurrences) Relation (Name,Age of Onset)    Cerebrovascular Disease (1) Paternal Grandmother: TIA    Diabetes (4) Maternal Grandmother (Amy), Maternal Grandfather (Willis), Paternal Grandmother, Paternal Grandfather    Fractures (1) Maternal Grandmother (Amy): Hip    Hyperlipidemia (2) Mother (Raiza): high triglycerides, Father (Remy): not on meds    Hypertension (4) Mother (Raiza), Father (Remy), Maternal Grandmother (Amy), Paternal Grandmother    Hypothyroidism (1) Daughter    Lung Cancer (1) Maternal Grandfather (Willis, 68): smoker    No Known Problems (3) Son, Son, Son    Osteoporosis (1) Maternal Grandmother (Amy)    Stomach Cancer (1) Paternal Grandfather (68)    Thyroid Disease (1) Mother (Raiza)       Negative family history of: Coronary Artery Disease, Breast Cancer, Colon Cancer            Current Outpatient Medications   Medication Sig     Calcium-Magnesium-Zinc 333-133-5 MG TABS Take 1 tablet by mouth At Bedtime     escitalopram (LEXAPRO) 20 MG tablet TAKE 1/2 TO 1 TABLET(10 TO 20 MG) BY MOUTH EVERY  "EVENING     IRON-FOLIC ACID PO      KRILL OIL PO Take 1 capsule by mouth every evening      levothyroxine (SYNTHROID/LEVOTHROID) 137 MCG tablet Take 1 tablet (137 mcg) by mouth daily     lisinopril (PRINIVIL/ZESTRIL) 10 MG tablet Take 1 tablet (10 mg) by mouth daily     meloxicam (MOBIC) 15 MG tablet      NIFEdipine ER OSMOTIC (PROCARDIA XL) 60 MG 24 hr tablet Take 1 tablet (60 mg) by mouth daily     olopatadine (PATANOL) 0.1 % ophthalmic solution Place 1 drop into both eyes 2 times daily     Pediatric Multivit-Minerals-C (MULTIVITAMIN GUMMIES CHILDRENS PO) Take 2 chew tab by mouth daily as needed     Thiamine HCl (VITAMIN B-1 PO)      No current facility-administered medications for this visit.      Allergies   Allergen Reactions     Imitrex [Sumatriptan] Other (See Comments)     Throat tightness       ROS:  12 point review of systems negative other than symptoms noted below or in the HPI.  Genitourinary: Pelvic Pain  No urinary frequency or dysuria, bladder or kidney problems      OBJECTIVE:     /76   Pulse 74   Ht 1.676 m (5' 6\")   Wt 107.5 kg (237 lb)   LMP 03/17/2014   Breastfeeding No   BMI 38.25 kg/m    Body mass index is 38.25 kg/m .    Exam:  Constitutional:  Appearance: Well nourished, well developed alert, in no acute distress  Pelvic Exam:  External Genitalia:     Normal appearance for age, no discharge present, no tenderness present, no inflammatory lesions present, color normal  Vagina:     Normal vaginal vault without central or paravaginal defects, no discharge present, no inflammatory lesions present, no masses present  Bladder:     Nontender to palpation  Urethra:   Urethral Body:  Urethra palpation normal, urethra structural support normal   Urethral Meatus:  No erythema or lesions present  Cervix:     Appearance healthy, no lesions present, nontender to palpation, no bleeding present  Uterus:     Surgically absent  Adnexa:     Mostly left adnexal tenderness present, no adnexal masses " present  Perineum:     Perineum within normal limits, no evidence of trauma, no rashes or skin lesions present  Anus:     Anus within normal limits, no hemorrhoids present  Inguinal Lymph Nodes:     No lymphadenopathy present  Pubic Hair:     Normal pubic hair distribution for age  Genitalia and Groin:     No rashes present, no lesions present, no areas of discoloration, no masses present       In-Clinic Test Results:  No results found for this or any previous visit (from the past 24 hour(s)).    ASSESSMENT/PLAN:                                                        ICD-10-CM    1. Pelvic pain in female  R10.2        There are no Patient Instructions on file for this visit.    Patient to do a pelvic US, unable to do in our office this week, patient referred to [facility].  Will follow up with me after US.      NICHOLAS Watson [facility]

## 2020-06-24 ENCOUNTER — OFFICE VISIT (OUTPATIENT)
Dept: OBGYN | Facility: CLINIC | Age: 50
End: 2020-06-24
Payer: COMMERCIAL

## 2020-06-24 VITALS
WEIGHT: 237 LBS | HEIGHT: 66 IN | BODY MASS INDEX: 38.09 KG/M2 | SYSTOLIC BLOOD PRESSURE: 110 MMHG | DIASTOLIC BLOOD PRESSURE: 76 MMHG | HEART RATE: 74 BPM

## 2020-06-24 DIAGNOSIS — R10.2 PELVIC PAIN IN FEMALE: Primary | ICD-10-CM

## 2020-06-24 PROCEDURE — 99213 OFFICE O/P EST LOW 20 MIN: CPT | Performed by: NURSE PRACTITIONER

## 2020-06-24 RX ORDER — MELOXICAM 15 MG/1
TABLET ORAL
COMMUNITY
Start: 2020-06-10 | End: 2020-10-05

## 2020-06-24 ASSESSMENT — MIFFLIN-ST. JEOR: SCORE: 1711.77

## 2020-06-24 NOTE — PROGRESS NOTES
SUBJECTIVE:                                                   Christine Dubose is a 50 year old female who presents to clinic today for the following health issue(s):  Patient presents with:  Ultrasound      Additional information: Ultrasound at [facility] today.     HPI: Patient here for follow up pelvic US and pelvic pain.  US done at [facility] today.  Her pain is not any better, she cannot take ibuprofen, been doing tylenol but not much help.  She has had a LASH      Patient's last menstrual period was 2014..     Patient is sexually active, .  Using hysterectomy for contraception.    reports that she has never smoked. She has never used smokeless tobacco.    STD testing offered?  Declined    Health maintenance updated:  yes    Today's PHQ-2 Score:   PHQ-2 (  [employer]) 2020   Q1: Little interest or pleasure in doing things 0   Q2: Feeling down, depressed or hopeless 0   PHQ-2 Score 0     Today's PHQ-9 Score:   PHQ-9 SCORE 2019   PHQ-9 Total Score -   PHQ-9 Total Score 1     Today's JAYDE-7 Score:   JAYDE-7 SCORE 2019   Total Score -   Total Score 0       Problem list and histories reviewed & adjusted, as indicated.  Additional history: as documented.    Patient Active Problem List   Diagnosis     CARDIOVASCULAR SCREENING; LDL GOAL LESS THAN 160     Hypertension goal BP (blood pressure) < 130/80     Migraine     Osteoarthritis     Knee joint replacement by other means     Abnormal gait     Low back pain     Synovial cyst     History of arthroplasty of left knee     Posttraumatic stress disorder     Hypothyroidism, unspecified type     Arthritis of right hand     Arthralgia of right acromioclavicular joint     Morbid obesity (H)     Anxiety     Left ovarian cyst     Past Surgical History:   Procedure Laterality Date     ARTHROPLASTY KNEE UNICOMPARTMENT Right 2015    Procedure: ARTHROPLASTY KNEE UNICOMPARTMENT;  Surgeon: Pablo Lara MD;  Location:  OR      ARTHROPLASTY KNEE UNICOMPARTMENT Left 10/27/2015    Procedure: ARTHROPLASTY KNEE UNICOMPARTMENT;  Surgeon: Pablo Lara MD;  Location:  OR      SECTION  1997    for breech     CYCTOCELE REPAIR       CYSTOSCOPY  2014    Procedure: CYSTOSCOPY;;  Surgeon: Candie Crandall MD;  Location: [facility]     DECOMPRESSION LUMBAR ONE LEVEL Right 2015    Procedure: DECOMPRESSION LUMBAR ONE LEVEL;  Surgeon: Elio Starr MD;  Location:  OR     DILATION AND CURETTAGE, OPERATIVE HYSTEROSCOPY, COMBINED  3/8/2012    Procedure:COMBINED DILATION AND CURETTAGE, OPERATIVE HYSTEROSCOPY; OPERATIVE HYSTEROSCOPY, DILATION AND CURETTAGE, POSSIBLE RESECTION OF POLYP; Surgeon:CANDIE CRANDALL; Location:[facility]     ENT SURGERY      TONSILLECTOMY     GENITOURINARY SURGERY      BLADDER SLING     LAPAROSCOPIC HYSTERECTOMY SUPRACERVICAL  2014    Procedure: LAPAROSCOPIC HYSTERECTOMY SUPRACERVICAL;  LAPAROSCOPIC ASSISTED SUPRACERVICAL HYSTERECTOMY, cystoscopy;  Surgeon: Candie Crandall MD;  Location: [facility]     STRIP VEIN  2010     TONSILLECTOMY  1974      Social History     Tobacco Use     Smoking status: Never Smoker     Smokeless tobacco: Never Used   Substance Use Topics     Alcohol use: Yes     Alcohol/week: 0.0 standard drinks     Comment: Socially      Problem (# of Occurrences) Relation (Name,Age of Onset)    Cerebrovascular Disease (1) Paternal Grandmother: TIA    Diabetes (4) Maternal Grandmother (Amy), Maternal Grandfather (Willis), Paternal Grandmother, Paternal Grandfather    Fractures (1) Maternal Grandmother (Amy): Hip    Hyperlipidemia (2) Mother (Raiza): high triglycerides, Father (Remy): not on meds    Hypertension (4) Mother (Raiza), Father (Remy), Maternal Grandmother (Amy), Paternal Grandmother    Hypothyroidism (1) Daughter    Lung Cancer (1) Maternal Grandfather (Willis, 68): smoker    No Known Problems (3) Son, Son, Son    Osteoporosis  "(1) Maternal Grandmother (Amy)    Stomach Cancer (1) Paternal Grandfather (68)    Thyroid Disease (1) Mother (Raiza)       Negative family history of: Coronary Artery Disease, Breast Cancer, Colon Cancer            Current Outpatient Medications   Medication Sig     acetaminophen-codeine (TYLENOL #3) 300-30 MG tablet Take 1 tablet by mouth every 6 hours as needed for severe pain     Calcium-Magnesium-Zinc 333-133-5 MG TABS Take 1 tablet by mouth At Bedtime     escitalopram (LEXAPRO) 20 MG tablet TAKE 1/2 TO 1 TABLET(10 TO 20 MG) BY MOUTH EVERY EVENING     IRON-FOLIC ACID PO      KRILL OIL PO Take 1 capsule by mouth every evening      levothyroxine (SYNTHROID/LEVOTHROID) 137 MCG tablet Take 1 tablet (137 mcg) by mouth daily     lisinopril (PRINIVIL/ZESTRIL) 10 MG tablet Take 1 tablet (10 mg) by mouth daily     meloxicam (MOBIC) 15 MG tablet      NIFEdipine ER OSMOTIC (PROCARDIA XL) 60 MG 24 hr tablet Take 1 tablet (60 mg) by mouth daily     olopatadine (PATANOL) 0.1 % ophthalmic solution Place 1 drop into both eyes 2 times daily     Pediatric Multivit-Minerals-C (MULTIVITAMIN GUMMIES CHILDRENS PO) Take 2 chew tab by mouth daily as needed     Thiamine HCl (VITAMIN B-1 PO)      No current facility-administered medications for this visit.      Allergies   Allergen Reactions     Imitrex [Sumatriptan] Other (See Comments)     Throat tightness       ROS:  12 point review of systems negative other than symptoms noted below or in the HPI.  No urinary frequency or dysuria, bladder or kidney problems      OBJECTIVE:     /82 (BP Location: Right arm, Patient Position: Sitting, Cuff Size: Adult Regular)   Pulse 76   Ht 1.676 m (5' 6\")   Wt 107.5 kg (236 lb 15.9 oz)   LMP 03/17/2014   BMI 38.25 kg/m    Body mass index is 38.25 kg/m .    Exam:  Constitutional:  Appearance: Well nourished, well developed alert, in no acute distress  See scanned report Us showed a 3.7cm cyst on left ovary.  Patient is frustrated, " states she keeps getting cysts on this ovary.  Going to check [facility] to see if menopausal.  Patient wants to meet with DR. Andersen and talk about having ovary surgically removed, because of history of cysts on this ovary and pain is a lot.    In-Clinic Test Results:  No results found for this or any previous visit (from the past 24 hour(s)).    ASSESSMENT/PLAN:                                                        ICD-10-CM    1. Symptomatic menopausal or female climacteric states  N95.1 Follicle stimulating hormone   2. Cyst of left ovary  N83.202 acetaminophen-codeine (TYLENOL #3) 300-30 MG tablet       There are no Patient Instructions on file for this visit.    Patient scheduled to further discuss options with Dr. Andersen on 06/30/2020  Patient requesting a few pain pills, tylenol not helping at all, cannot doing any ibuprofen.    NICHOLAS Watson [facility]

## 2020-06-25 ENCOUNTER — TRANSFERRED RECORDS (OUTPATIENT)
Dept: HEALTH INFORMATION MANAGEMENT | Facility: CLINIC | Age: 50
End: 2020-06-25

## 2020-06-25 ENCOUNTER — OFFICE VISIT (OUTPATIENT)
Dept: OBGYN | Facility: CLINIC | Age: 50
End: 2020-06-25
Payer: COMMERCIAL

## 2020-06-25 VITALS
WEIGHT: 236.99 LBS | BODY MASS INDEX: 38.09 KG/M2 | SYSTOLIC BLOOD PRESSURE: 124 MMHG | HEART RATE: 76 BPM | DIASTOLIC BLOOD PRESSURE: 82 MMHG | HEIGHT: 66 IN

## 2020-06-25 DIAGNOSIS — N83.202 CYST OF LEFT OVARY: ICD-10-CM

## 2020-06-25 DIAGNOSIS — N95.1 SYMPTOMATIC MENOPAUSAL OR FEMALE CLIMACTERIC STATES: Primary | ICD-10-CM

## 2020-06-25 PROCEDURE — 99213 OFFICE O/P EST LOW 20 MIN: CPT | Performed by: NURSE PRACTITIONER

## 2020-06-25 PROCEDURE — 36415 COLL VENOUS BLD VENIPUNCTURE: CPT | Performed by: NURSE PRACTITIONER

## 2020-06-25 PROCEDURE — 83001 ASSAY OF GONADOTROPIN (FSH): CPT | Performed by: NURSE PRACTITIONER

## 2020-06-25 ASSESSMENT — MIFFLIN-ST. JEOR: SCORE: 1711.75

## 2020-06-26 LAB — FSH SERPL-ACNC: 24.7 IU/L

## 2020-06-29 NOTE — PROGRESS NOTES
"  SUBJECTIVE:                                                   Christine Dubose is a 50 year old female who presents to clinic today for the following health issue(s):  Patient presents with:  Consult: oavarianectomy      Additional information: NA    HPI:  Here today to discuss/follow up left ovarian cyst.  Saw Mandie Ott last week for LLQ pain and was found to have 3.7x3.3x3cm left simple cyst.  Has had cysts on this ovary in the past with similar pain episodes over the last 15 months.  S/p LASH with Dr. Crandall in .  No vb/spotting.    Pain was severe enough to lay Candie up for 3d over the past weekend.  +nausea.  No emesis.  Slight improvement with tylenol.  No pain today but describes \"nagging\" sensation in the LLQ.  Back to normal activities  No bowel/bladder issues.  Has had prior LASH as well as  section for abdominal surgery  PCP --Dr Conde      Patient's last menstrual period was 2014..     Patient is sexually active, .  Using hysterectomy for contraception.    reports that she has never smoked. She has never used smokeless tobacco.    STD testing offered?  Declined    Health maintenance updated:  no    Today's PHQ-2 Score:   PHQ-2 (  [employer]) 2020   Q1: Little interest or pleasure in doing things 0   Q2: Feeling down, depressed or hopeless 0   PHQ-2 Score 0     Today's PHQ-9 Score:   PHQ-9 SCORE 2019   PHQ-9 Total Score -   PHQ-9 Total Score 1     Today's JAYDE-7 Score:   JAYDE-7 SCORE 2019   Total Score -   Total Score 0       Problem list and histories reviewed & adjusted, as indicated.  Additional history: as documented.    Patient Active Problem List   Diagnosis     CARDIOVASCULAR SCREENING; LDL GOAL LESS THAN 160     Hypertension goal BP (blood pressure) < 130/80     Migraine     Osteoarthritis     Knee joint replacement by other means     Abnormal gait     Low back pain     Synovial cyst     History of arthroplasty of left knee     Posttraumatic stress " disorder     Hypothyroidism, unspecified type     Arthritis of right hand     Arthralgia of right acromioclavicular joint     Morbid obesity (H)     Anxiety     Left ovarian cyst     Cyst of left ovary     Past Surgical History:   Procedure Laterality Date     ARTHROPLASTY KNEE UNICOMPARTMENT Right 2015    Procedure: ARTHROPLASTY KNEE UNICOMPARTMENT;  Surgeon: Pablo Lara MD;  Location:  OR     ARTHROPLASTY KNEE UNICOMPARTMENT Left 10/27/2015    Procedure: ARTHROPLASTY KNEE UNICOMPARTMENT;  Surgeon: Pablo Lara MD;  Location:  OR      SECTION  1997    for breech     CYCTOCELE REPAIR       CYSTOSCOPY  2014    Procedure: CYSTOSCOPY;;  Surgeon: Candie Crandall MD;  Location: [facility]     DECOMPRESSION LUMBAR ONE LEVEL Right 2015    Procedure: DECOMPRESSION LUMBAR ONE LEVEL;  Surgeon: Elio Starr MD;  Location:  OR     DILATION AND CURETTAGE, OPERATIVE HYSTEROSCOPY, COMBINED  3/8/2012    Procedure:COMBINED DILATION AND CURETTAGE, OPERATIVE HYSTEROSCOPY; OPERATIVE HYSTEROSCOPY, DILATION AND CURETTAGE, POSSIBLE RESECTION OF POLYP; Surgeon:CANDIE CRANDALL; Location:[facility]     ENT SURGERY      TONSILLECTOMY     GENITOURINARY SURGERY      BLADDER SLING     LAPAROSCOPIC HYSTERECTOMY SUPRACERVICAL  2014    Procedure: LAPAROSCOPIC HYSTERECTOMY SUPRACERVICAL;  LAPAROSCOPIC ASSISTED SUPRACERVICAL HYSTERECTOMY, cystoscopy;  Surgeon: Candie Crandall MD;  Location: [facility]     STRIP VEIN  2010     TONSILLECTOMY  1974      Social History     Tobacco Use     Smoking status: Never Smoker     Smokeless tobacco: Never Used   Substance Use Topics     Alcohol use: Yes     Alcohol/week: 0.0 standard drinks     Comment: Socially      Problem (# of Occurrences) Relation (Name,Age of Onset)    Cerebrovascular Disease (1) Paternal Grandmother: TIA    Diabetes (4) Maternal Grandmother (Amy), Maternal Grandfather (Willis), Paternal Grandmother, Paternal  Grandfather    Fractures (1) Maternal Grandmother (Amy): Hip    Hyperlipidemia (2) Mother (Raiza): high triglycerides, Father (Remy): not on meds    Hypertension (4) Mother (Raiza), Father (Remy), Maternal Grandmother (Amy), Paternal Grandmother    Hypothyroidism (1) Daughter    Lung Cancer (1) Maternal Grandfather (Willis, 68): smoker    No Known Problems (3) Son, Son, Son    Osteoporosis (1) Maternal Grandmother (Amy)    Stomach Cancer (1) Paternal Grandfather (68)    Thyroid Disease (1) Mother (Raiza)       Negative family history of: Coronary Artery Disease, Breast Cancer, Colon Cancer            Current Outpatient Medications   Medication Sig     Calcium-Magnesium-Zinc 333-133-5 MG TABS Take 1 tablet by mouth At Bedtime     escitalopram (LEXAPRO) 20 MG tablet TAKE 1/2 TO 1 TABLET(10 TO 20 MG) BY MOUTH EVERY EVENING     IRON-FOLIC ACID PO      KRILL OIL PO Take 1 capsule by mouth every evening      levothyroxine (SYNTHROID/LEVOTHROID) 137 MCG tablet Take 1 tablet (137 mcg) by mouth daily     lisinopril (ZESTRIL) 10 MG tablet TAKE 1 TABLET BY MOUTH DAILY     meloxicam (MOBIC) 15 MG tablet      NIFEdipine ER OSMOTIC (PROCARDIA XL) 60 MG 24 hr tablet TAKE 1 TABLET BY MOUTH DAILY     olopatadine (PATANOL) 0.1 % ophthalmic solution Place 1 drop into both eyes 2 times daily     Pediatric Multivit-Minerals-C (MULTIVITAMIN GUMMIES CHILDRENS PO) Take 2 chew tab by mouth daily as needed     Thiamine HCl (VITAMIN B-1 PO)      No current facility-administered medications for this visit.      Allergies   Allergen Reactions     Imitrex [Sumatriptan] Other (See Comments)     Throat tightness       ROS:  12 point review of systems negative other than symptoms noted below or in the HPI.  No urinary frequency or dysuria, bladder or kidney problems      OBJECTIVE:     /80   Pulse 68   Wt 107 kg (236 lb)   LMP 03/17/2014   BMI 38.09 kg/m    Body mass index is 38.09 kg/m .    Exam:  Constitutional:   Appearance: Well nourished, well developed alert, in no acute distress  Chest:  Respiratory Effort:  Breathing unlabored. Clear to auscultation bilaterally.   Cardiovascular: Heart: Auscultation:  Regular rate, normal rhythm, no murmurs present  Gastrointestinal:  Abdominal Examination:  Abdomen nontender to palpation, tone normal without rigidity or guarding, no masses present, umbilicus without lesions; Liver/Spleen:  No hepatomegaly present, liver nontender to palpation; Hernias:  No hernias present  Neurologic:  Mental Status:  Oriented X3.  Normal strength and tone, sensory exam grossly normal, mentation intact and speech normal.    Psychiatric:  Mentation appears normal and affect normal/bright.     In-Clinic Test Results:  No results found for this or any previous visit (from the past 24 hour(s)).    ASSESSMENT/PLAN:                                                        ICD-10-CM    1. Cyst of left ovary  N83.202        Patient Instructions   Outside ultrasound report reviewed and left ovarian cyst discussed.  I am not entirely convinced that this a new cyst but that her initial cyst in March 2019 has persisted.  Regardless, we discussed options for surgical removal vs continued obseervation.  Given repeat episodes of pain in the last 15 months, Candie would like to proceed with surgical removal.  With Candie's age, I would recommend removal of bilateral ovaries at this time.  Discussed surgical menopause and symptoms she may experience.  Discussed possible HRT and/or vaginal estrogen if needed.  Will monitor postoperatively.    Will need preop with primary care MD prior to surgery.      Christiana Andersen MD  [facility]

## 2020-06-30 ENCOUNTER — OFFICE VISIT (OUTPATIENT)
Dept: OBGYN | Facility: CLINIC | Age: 50
End: 2020-06-30
Payer: COMMERCIAL

## 2020-06-30 ENCOUNTER — PREP FOR PROCEDURE (OUTPATIENT)
Dept: OBGYN | Facility: CLINIC | Age: 50
End: 2020-06-30

## 2020-06-30 ENCOUNTER — TELEPHONE (OUTPATIENT)
Dept: OBGYN | Facility: CLINIC | Age: 50
End: 2020-06-30

## 2020-06-30 VITALS
DIASTOLIC BLOOD PRESSURE: 80 MMHG | BODY MASS INDEX: 38.09 KG/M2 | SYSTOLIC BLOOD PRESSURE: 128 MMHG | HEART RATE: 68 BPM | WEIGHT: 236 LBS

## 2020-06-30 DIAGNOSIS — N83.202 CYST OF LEFT OVARY: Primary | ICD-10-CM

## 2020-06-30 DIAGNOSIS — Z11.59 ENCOUNTER FOR SCREENING FOR OTHER VIRAL DISEASES: Primary | ICD-10-CM

## 2020-06-30 PROCEDURE — 99213 OFFICE O/P EST LOW 20 MIN: CPT | Performed by: OBSTETRICS & GYNECOLOGY

## 2020-06-30 NOTE — PATIENT INSTRUCTIONS
Outside ultrasound report reviewed and left ovarian cyst discussed.  I am not entirely convinced that this a new cyst but that her initial cyst in March 2019 has persisted.  Regardless, we discussed options for surgical removal vs continued obseervation.  Given repeat episodes of pain in the last 15 months, Candie would like to proceed with surgical removal.  With Candie's age, I would recommend removal of bilateral ovaries at this time.  Discussed surgical menopause and symptoms she may experience.  Discussed possible HRT and/or vaginal estrogen if needed.  Will monitor postoperatively.    Will need preop with primary care MD prior to surgery.

## 2020-06-30 NOTE — TELEPHONE ENCOUNTER
Type of surgery: LSC NATHAN  Location of surgery: [facility]  Date and time of surgery: 7/23/2020 7:30a  Surgeon: Ganesh staples/Sam  Pre-Op Appt Date: PCP  Post-Op Appt Date: TBD   Packet sent out: HANDED 6/30/202  Pre-cert/Authorization completed:  TBD  Date: 6/30/2020 Alonzo staples/Kenyetta Aparicio  Surgery Scheduler    DX N83.202  CPT 29488      Additional Instructions for the Case    ASSIST: Sam    H&P TO BE COMPLETED BY:   PCP  FOR H&P TO BE DONE BY SURGEON     SAME DAY/OBSERVATION/INPATIENT: NA    EQUIPMENT: [employer], endocatch bag  VENDOR NEEDED AT CASE: JAY JAY  LABS/SPECIAL INSTRUCTIONS: JAY JAY  TIME OFF WORK: 2 weeks

## 2020-07-10 ENCOUNTER — OFFICE VISIT (OUTPATIENT)
Dept: FAMILY MEDICINE | Facility: CLINIC | Age: 50
End: 2020-07-10
Payer: COMMERCIAL

## 2020-07-10 VITALS
SYSTOLIC BLOOD PRESSURE: 120 MMHG | TEMPERATURE: 97 F | HEART RATE: 84 BPM | BODY MASS INDEX: 38.09 KG/M2 | WEIGHT: 237 LBS | OXYGEN SATURATION: 97 % | HEIGHT: 66 IN | DIASTOLIC BLOOD PRESSURE: 80 MMHG

## 2020-07-10 DIAGNOSIS — Z12.11 SCREEN FOR COLON CANCER: ICD-10-CM

## 2020-07-10 DIAGNOSIS — E66.01 MORBID OBESITY (H): ICD-10-CM

## 2020-07-10 DIAGNOSIS — E03.9 HYPOTHYROIDISM, UNSPECIFIED TYPE: ICD-10-CM

## 2020-07-10 DIAGNOSIS — N83.202 LEFT OVARIAN CYST: ICD-10-CM

## 2020-07-10 DIAGNOSIS — I10 HYPERTENSION GOAL BP (BLOOD PRESSURE) < 130/80: ICD-10-CM

## 2020-07-10 DIAGNOSIS — Z01.818 PREOP GENERAL PHYSICAL EXAM: Primary | ICD-10-CM

## 2020-07-10 PROCEDURE — 99215 OFFICE O/P EST HI 40 MIN: CPT | Performed by: FAMILY MEDICINE

## 2020-07-10 PROCEDURE — 93000 ELECTROCARDIOGRAM COMPLETE: CPT | Performed by: FAMILY MEDICINE

## 2020-07-10 ASSESSMENT — MIFFLIN-ST. JEOR: SCORE: 1711.77

## 2020-07-10 NOTE — PROGRESS NOTES
[address] [address]  [phone]  Dept: [phone]    PRE-OP EVALUATION:  Today's date: 7/10/2020    Christine Dubose (: 1970) presents for pre-operative evaluation assessment as requested by Dr. Ganesh Vargas.  She requires evaluation and anesthesia risk assessment prior to undergoing surgery/procedure for treatment of Cyst of left ovary .    Proposed Surgery/ Procedure: LAPAROSCOPIC BILATERAL OOPHORECTOMY  Date of Surgery/ Procedure: 2020  Time of Surgery/ Procedure: 7:30 AM  Hospital/Surgical Facility: [facility]  Primary Physician: Prudencio Conde  Type of Anesthesia Anticipated: General    Patient has a Health Care Directive or Living Will:  NO    1. NO - Do you have a history of heart attack, stroke, stent, bypass or surgery on an artery in the head, neck, heart or legs?  2. NO - Do you ever have any pain or discomfort in your chest?  3. NO - Do you have a history of  Heart Failure?  4. NO - Are you troubled by shortness of breath when: walking on the level, up a slight hill or at night?  5. NO - Do you currently have a cold, bronchitis or other respiratory infection?  6. NO - Do you have a cough, shortness of breath or wheezing?  7. NO - Do you sometimes get pains in the calves of your legs when you walk?  8. NO - Do you or anyone in your family have previous history of blood clots?  9. NO - Do you or does anyone in your family have a serious bleeding problem such as prolonged bleeding following surgeries or cuts?  10. NO - Have you ever had problems with anemia or been told to take iron pills?  11. NO - Have you had any abnormal blood loss such as black, tarry or bloody stools, or abnormal vaginal bleeding?  12. NO - Have you ever had a blood transfusion?  13. NO - Have you or any of your relatives ever had problems with anesthesia?  14. NO - Do you have sleep apnea, excessive snoring or daytime drowsiness?  15.  NO - Do you have any prosthetic heart valves?  16. YES - DO YOU HAVE PROSTHETIC JOINTS? BOTH knees  17. NO - Is there any chance that you may be pregnant?      HPI:     HPI related to upcoming procedure: left pelvic pain      HYPERTENSION - Patient has longstanding history of HTN , currently denies any symptoms referable to elevated blood pressure. Specifically denies chest pain, palpitations, dyspnea, orthopnea, PND or peripheral edema. Blood pressure readings have been in normal range. Current medication regimen is as listed below. Patient denies any side effects of medication.     HYPOTHYROIDISM - Patient has a longstanding history of chronic Hypothyroidism. Patient has been doing well, noting no tremor, insomnia, hair loss or changes in skin texture. Continues to take medications as directed, without adverse reactions or side effects. Last TSH   Lab Results   Component Value Date    TSH 0.44 01/30/2020   .        MEDICAL HISTORY:     Patient Active Problem List    Diagnosis Date Noted     Morbid obesity (H) 04/30/2018     Priority: High     Hypothyroidism, unspecified type 03/20/2017     Priority: High     Followed by endocrine (Jamie)       Hypertension goal BP (blood pressure) < 130/80 10/16/2012     Priority: High     Migraine 10/16/2012     Priority: High     Cyst of left ovary 06/30/2020     Priority: Medium     Added automatically from request for surgery [id number]       Left ovarian cyst 04/12/2019     Priority: Medium     Anxiety 08/21/2018     Priority: Medium     Arthritis of right hand 08/03/2017     Priority: Medium     Arthralgia of right acromioclavicular joint 08/03/2017     Priority: Medium     Posttraumatic stress disorder 04/14/2016     Priority: Medium     History of arthroplasty of left knee 10/27/2015     Priority: Medium     Synovial cyst 09/16/2015     Priority: Medium     Low back pain 04/20/2015     Priority: Medium     Diagnosis updated by automated process. Provider to review and  confirm.       Knee joint replacement by other means 2015     Priority: Medium     Abnormal gait 2015     Priority: Medium     Osteoarthritis 2015     Priority: Medium     CARDIOVASCULAR SCREENING; LDL GOAL LESS THAN 160 10/09/2012     Priority: Medium      Past Medical History:   Diagnosis Date     Arthritis      Chronic pain     CHRONIC NECK PAIN     Depression 2005    on Lexapro     Dysfunctional uterine bleeding      Heart murmur      Hypertension      Menorrhagia      Miscarriage 2001     MVP (mitral valve prolapse)     Needs antibiotics for procedures     Other chronic pain      Other forms of migraine, without mention of intractable migraine without mention of status migrainosus     takes Zomig     Ovarian cyst 2019    found in ER, L side     Seizures (H)     Had seizure in . Was on Tegretol, but discontinued the meds on her own.Sees Dr. Gume Morgan at [facility].Was started on Depakote in  for migrines in addition to Zomig     Stress incontinence, female 2009    Dr. Kurtz-Cystocoele repair and pubovag slling     Thyroid disease     on synthroid-Hashimotos thyroiditis; sees Lorenza     Past Surgical History:   Procedure Laterality Date     ARTHROPLASTY KNEE UNICOMPARTMENT Right 2015    Procedure: ARTHROPLASTY KNEE UNICOMPARTMENT;  Surgeon: Pablo Lara MD;  Location:  OR     ARTHROPLASTY KNEE UNICOMPARTMENT Left 10/27/2015    Procedure: ARTHROPLASTY KNEE UNICOMPARTMENT;  Surgeon: Pablo Lara MD;  Location:  OR      SECTION  1997    for breech     CYCTOCELE REPAIR       CYSTOSCOPY  2014    Procedure: CYSTOSCOPY;;  Surgeon: Candie Crandall MD;  Location:  SD     DECOMPRESSION LUMBAR ONE LEVEL Right 2015    Procedure: DECOMPRESSION LUMBAR ONE LEVEL;  Surgeon: Elio Starr MD;  Location:  OR     DILATION AND CURETTAGE, OPERATIVE HYSTEROSCOPY, COMBINED  3/8/2012    Procedure:COMBINED DILATION AND  CURETTAGE, OPERATIVE HYSTEROSCOPY; OPERATIVE HYSTEROSCOPY, DILATION AND CURETTAGE, POSSIBLE RESECTION OF POLYP; Surgeon:AD CRANDALL; Location[facility]     ENT SURGERY      TONSILLECTOMY     GENITOURINARY SURGERY      BLADDER SLING     LAPAROSCOPIC HYSTERECTOMY SUPRACERVICAL  4/9/2014    Procedure: LAPAROSCOPIC HYSTERECTOMY SUPRACERVICAL;  LAPAROSCOPIC ASSISTED SUPRACERVICAL HYSTERECTOMY, cystoscopy;  Surgeon: Ad Crandall MD;  Location: [facility]     STRIP VEIN  11/2010     TONSILLECTOMY  1974     Current Outpatient Medications   Medication Sig Dispense Refill     Calcium-Magnesium-Zinc 333-133-5 MG TABS Take 1 tablet by mouth At Bedtime       escitalopram (LEXAPRO) 20 MG tablet TAKE 1/2 TO 1 TABLET(10 TO 20 MG) BY MOUTH EVERY EVENING 90 tablet 1     IRON-FOLIC ACID PO        KRILL OIL PO Take 1 capsule by mouth every evening        levothyroxine (SYNTHROID/LEVOTHROID) 137 MCG tablet Take 1 tablet (137 mcg) by mouth daily 90 tablet 3     lisinopril (ZESTRIL) 10 MG tablet TAKE 1 TABLET BY MOUTH DAILY 90 tablet 3     meloxicam (MOBIC) 15 MG tablet        NIFEdipine ER OSMOTIC (PROCARDIA XL) 60 MG 24 hr tablet TAKE 1 TABLET BY MOUTH DAILY 90 tablet 3     Pediatric Multivit-Minerals-C (MULTIVITAMIN GUMMIES CHILDRENS PO) Take 2 chew tab by mouth daily as needed       olopatadine (PATANOL) 0.1 % ophthalmic solution Place 1 drop into both eyes 2 times daily 1 Bottle 3     OTC products: None, except as noted above    Allergies   Allergen Reactions     Imitrex [Sumatriptan] Other (See Comments)     Throat tightness      Latex Allergy: NO    Social History     Tobacco Use     Smoking status: Never Smoker     Smokeless tobacco: Never Used   Substance Use Topics     Alcohol use: Yes     Alcohol/week: 0.0 standard drinks     Comment: Socially     History   Drug Use No       REVIEW OF SYSTEMS:   Constitutional, neuro, ENT, endocrine, pulmonary, cardiac, gastrointestinal, genitourinary, musculoskeletal,  "integument and psychiatric systems are negative, except as otherwise noted.    EXAM:   /80   Pulse 84   Temp 97  F (36.1  C) (Tympanic)   Ht 1.676 m (5' 6\")   Wt 107.5 kg (237 lb)   LMP 03/17/2014   SpO2 97%   BMI 38.25 kg/m      GENERAL APPEARANCE: healthy, alert and no distress     EYES: EOMI, PERRL     HENT: ear canals and TM's normal and nose and mouth without ulcers or lesions     NECK: no adenopathy, no asymmetry, masses, or scars and thyroid normal to palpation     RESP: lungs clear to auscultation - no rales, rhonchi or wheezes     CV: regular rates and rhythm, normal S1 S2, no S3 or S4 and no murmur, click or rub     ABDOMEN:  soft, nontender, no HSM or masses and bowel sounds normal     MS: extremities normal- no gross deformities noted, no evidence of inflammation in joints, FROM in all extremities.     SKIN: no suspicious lesions or rashes     NEURO: Normal strength and tone, sensory exam grossly normal, mentation intact and speech normal     PSYCH: mentation appears normal. and affect normal/bright     LYMPHATICS: No cervical adenopathy    DIAGNOSTICS:   EKG: appears normal, NSR, normal axis, normal intervals, no acute ST/T changes c/w ischemia, no LVH by voltage criteria, unchanged from previous tracings    Recent Labs   Lab Test 05/03/19  1039 03/31/19  1541  07/21/12  0850   HGB 13.5 15.1   < > 14.2    270   < > 229    137   < >  --    POTASSIUM 4.1 3.9   < >  --    CR 0.77 0.65   < >  --    A1C  --   --   --  5.2    < > = values in this interval not displayed.      TSH   Date Value Ref Range Status   01/30/2020 0.44 0.40 - 4.00 mU/L Final        IMPRESSION:   Reason for surgery/procedure:       ICD-10-CM    1. Preop general physical exam  Z01.818 EKG 12-lead complete w/read - Clinics   2. Left ovarian cyst  N83.202    3. Hypertension goal BP (blood pressure) < 130/80  I10    4. Hypothyroidism, unspecified type  E03.9    5. Morbid obesity (H)  E66.01    6. Screen for colon " cancer  Z12.11 GASTROENTEROLOGY ADULT REF PROCEDURE ONLY        The proposed surgical procedure is considered INTERMEDIATE risk.    REVISED CARDIAC RISK INDEX  The patient has the following serious cardiovascular risks for perioperative complications such as (MI, PE, VFib and 3  AV Block):  No serious cardiac risks  INTERPRETATION: 0 risks: Class I (very low risk - 0.4% complication rate)    The patient has the following additional risks for perioperative complications:  No identified additional risks  Morbid obesity    RECOMMENDATIONS:       --Patient is to take all scheduled medications on the day of surgery EXCEPT for modifications listed below.    ACE Inhibitor or Angiotensin Receptor Blocker (ARB) Use  Ace inhibitor or Angiotensin Receptor Blocker (ARB) and should HOLD this medication for the 24 hours prior to surgery.    Meloxicam  - stop 10 days prior to surgery    APPROVAL GIVEN to proceed with proposed procedure, without further diagnostic evaluation       Signed Electronically by: Prudencio Conde MD    Copy of this evaluation report is provided to requesting physician.    [place] Preop Guidelines    Revised Cardiac Risk Index

## 2020-07-10 NOTE — PATIENT INSTRUCTIONS
Before Your Surgery      Call your surgeon if there is any change in your health. This includes signs of a cold or flu (such as a sore throat, runny nose, cough, rash or fever).    Do not smoke, drink alcohol or take over the counter medicine (unless your surgeon or primary care doctor tells you to) for the 24 hours before and after surgery.    If you take prescribed drugs: Follow your doctor s orders about which medicines to take and which to stop until after surgery.    Eating and drinking prior to surgery: follow the instructions from your surgeon    Take a shower or bath the night before surgery. Use the soap your surgeon gave you to gently clean your skin. If you do not have soap from your surgeon, use your regular soap. Do not shave or scrub the surgery site.  Wear clean pajamas and have clean sheets on your bed.     HOLD lisinopril 24 hours prior to surgery  Meloxicam  - stop 10 days prior to surgery

## 2020-07-20 DIAGNOSIS — E03.9 HYPOTHYROIDISM, UNSPECIFIED TYPE: ICD-10-CM

## 2020-07-20 DIAGNOSIS — Z11.59 ENCOUNTER FOR SCREENING FOR OTHER VIRAL DISEASES: ICD-10-CM

## 2020-07-20 PROCEDURE — U0003 INFECTIOUS AGENT DETECTION BY NUCLEIC ACID (DNA OR RNA); SEVERE ACUTE RESPIRATORY SYNDROME CORONAVIRUS 2 (SARS-COV-2) (CORONAVIRUS DISEASE [COVID-19]), AMPLIFIED PROBE TECHNIQUE, MAKING USE OF HIGH THROUGHPUT TECHNOLOGIES AS DESCRIBED BY CMS-2020-01-R: HCPCS | Performed by: OBSTETRICS & GYNECOLOGY

## 2020-07-20 RX ORDER — LEVOTHYROXINE SODIUM 137 UG/1
TABLET ORAL
Qty: 90 TABLET | Refills: 1 | Status: SHIPPED | OUTPATIENT
Start: 2020-07-20 | End: 2020-10-05

## 2020-07-20 NOTE — TELEPHONE ENCOUNTER
"levothyroxine (SYNTHROID/LEVOTHROID) 137 MCG tablet  90 tablet  3  5/3/2019   No    Sig - Route: Take 1 tablet (137 mcg) by mouth daily - Oral    Sent to pharmacy as: levothyroxine (SYNTHROID/LEVOTHROID) 137 MCG tablet        Last office visit: 7/10/2020   Future Office Visit:   Next 5 appointments (look out 90 days)    Sep 09, 2020  2:00 PM CDT  PHYSICAL with Christiana Andersen MD  [facility] ([facility]) [address] [place] [address]  [phone]   Oct 05, 2020 10:00 AM CDT  [facility] [address] [place] [address]  [phone]               Requested Prescriptions   Pending Prescriptions Disp Refills     levothyroxine (SYNTHROID/LEVOTHROID) 137 MCG tablet [Pharmacy Med Name: LEVOTHYROXINE 0.137MG (137MCG) TAB] 90 tablet 3     Sig: TAKE 1 TABLET BY MOUTH DAILY       Thyroid Protocol Passed - 7/20/2020  4:14 AM        Passed - Patient is 12 years or older        Passed - Recent (12 mo) or future (30 days) visit within the authorizing provider's specialty     Patient has had an office visit with the authorizing provider or a provider within the authorizing providers department within the previous 12 mos or has a future within next 30 days. See \"Patient Info\" tab in inbasket, or \"Choose Columns\" in Meds & Orders section of the refill encounter.              Passed - Medication is active on med list        Passed - Normal TSH on file in past 12 months     Recent Labs   Lab Test 01/30/20  1516   TSH 0.44              Passed - No active pregnancy on record     If patient is pregnant or has had a positive pregnancy test, please check TSH.          Passed - No positive pregnancy test in past 12 months     If patient is pregnant or has had a positive pregnancy test, please check TSH.             Refill per RN protocol     Ciera Ahumada RN, " BSN  [place] T[facility]

## 2020-07-21 LAB
SARS-COV-2 RNA SPEC QL NAA+PROBE: NOT DETECTED
SPECIMEN SOURCE: NORMAL

## 2020-07-22 ENCOUNTER — ANESTHESIA EVENT (OUTPATIENT)
Dept: SURGERY | Facility: CLINIC | Age: 50
End: 2020-07-22
Payer: COMMERCIAL

## 2020-07-22 ASSESSMENT — ENCOUNTER SYMPTOMS: SEIZURES: 1

## 2020-07-23 ENCOUNTER — ANESTHESIA (OUTPATIENT)
Dept: SURGERY | Facility: CLINIC | Age: 50
End: 2020-07-23
Payer: COMMERCIAL

## 2020-07-23 ENCOUNTER — HOSPITAL ENCOUNTER (OUTPATIENT)
Facility: CLINIC | Age: 50
Discharge: HOME | End: 2020-07-23
Attending: OBSTETRICS & GYNECOLOGY | Admitting: OBSTETRICS & GYNECOLOGY
Payer: COMMERCIAL

## 2020-07-23 VITALS
HEIGHT: 66 IN | OXYGEN SATURATION: 92 % | HEART RATE: 63 BPM | WEIGHT: 236 LBS | TEMPERATURE: 97.6 F | SYSTOLIC BLOOD PRESSURE: 112 MMHG | BODY MASS INDEX: 37.93 KG/M2 | RESPIRATION RATE: 16 BRPM | DIASTOLIC BLOOD PRESSURE: 70 MMHG

## 2020-07-23 DIAGNOSIS — N83.202 CYST OF LEFT OVARY: ICD-10-CM

## 2020-07-23 PROCEDURE — 25000566 ZZH SEVOFLURANE, EA 15 MIN: Performed by: OBSTETRICS & GYNECOLOGY

## 2020-07-23 PROCEDURE — 25000125 ZZHC RX 250: Performed by: OBSTETRICS & GYNECOLOGY

## 2020-07-23 PROCEDURE — 40000170 ZZH STATISTIC PRE-PROCEDURE ASSESSMENT II: Performed by: OBSTETRICS & GYNECOLOGY

## 2020-07-23 PROCEDURE — 88305 TISSUE EXAM BY PATHOLOGIST: CPT | Performed by: OBSTETRICS & GYNECOLOGY

## 2020-07-23 PROCEDURE — 36000056 ZZH SURGERY LEVEL 3 1ST 30 MIN: Performed by: OBSTETRICS & GYNECOLOGY

## 2020-07-23 PROCEDURE — 88112 CYTOPATH CELL ENHANCE TECH: CPT | Mod: 26 | Performed by: PATHOLOGY

## 2020-07-23 PROCEDURE — 00000155 ZZHCL STATISTIC H-CELL BLOCK W/STAIN: Performed by: OBSTETRICS & GYNECOLOGY

## 2020-07-23 PROCEDURE — 88112 CYTOPATH CELL ENHANCE TECH: CPT | Performed by: OBSTETRICS & GYNECOLOGY

## 2020-07-23 PROCEDURE — 88305 TISSUE EXAM BY PATHOLOGIST: CPT | Mod: 26,59 | Performed by: OBSTETRICS & GYNECOLOGY

## 2020-07-23 PROCEDURE — 71000012 ZZH RECOVERY PHASE 1 LEVEL 1 FIRST HR: Performed by: OBSTETRICS & GYNECOLOGY

## 2020-07-23 PROCEDURE — 27210794 ZZH OR GENERAL SUPPLY STERILE: Performed by: OBSTETRICS & GYNECOLOGY

## 2020-07-23 PROCEDURE — 37000008 ZZH ANESTHESIA TECHNICAL FEE, 1ST 30 MIN: Performed by: OBSTETRICS & GYNECOLOGY

## 2020-07-23 PROCEDURE — 71000027 ZZH RECOVERY PHASE 2 EACH 15 MINS: Performed by: OBSTETRICS & GYNECOLOGY

## 2020-07-23 PROCEDURE — 25000128 H RX IP 250 OP 636: Performed by: ANESTHESIOLOGY

## 2020-07-23 PROCEDURE — 25000128 H RX IP 250 OP 636: Performed by: OBSTETRICS & GYNECOLOGY

## 2020-07-23 PROCEDURE — 88305 TISSUE EXAM BY PATHOLOGIST: CPT | Mod: 26,59 | Performed by: PATHOLOGY

## 2020-07-23 PROCEDURE — 25000125 ZZHC RX 250: Performed by: NURSE ANESTHETIST, CERTIFIED REGISTERED

## 2020-07-23 PROCEDURE — 71000013 ZZH RECOVERY PHASE 1 LEVEL 1 EA ADDTL HR: Performed by: OBSTETRICS & GYNECOLOGY

## 2020-07-23 PROCEDURE — 58999 UNLISTED PX FML GENITAL SYS: CPT | Mod: 51 | Performed by: OBSTETRICS & GYNECOLOGY

## 2020-07-23 PROCEDURE — 25800025 ZZH RX 258: Performed by: OBSTETRICS & GYNECOLOGY

## 2020-07-23 PROCEDURE — 25800030 ZZH RX IP 258 OP 636: Performed by: NURSE ANESTHETIST, CERTIFIED REGISTERED

## 2020-07-23 PROCEDURE — 58661 LAPAROSCOPY REMOVE ADNEXA: CPT | Mod: 80 | Performed by: OBSTETRICS & GYNECOLOGY

## 2020-07-23 PROCEDURE — 88305 TISSUE EXAM BY PATHOLOGIST: CPT | Mod: 26 | Performed by: OBSTETRICS & GYNECOLOGY

## 2020-07-23 PROCEDURE — 36000058 ZZH SURGERY LEVEL 3 EA 15 ADDTL MIN: Performed by: OBSTETRICS & GYNECOLOGY

## 2020-07-23 PROCEDURE — 25000132 ZZH RX MED GY IP 250 OP 250 PS 637: Performed by: OBSTETRICS & GYNECOLOGY

## 2020-07-23 PROCEDURE — 88112 CYTOPATH CELL ENHANCE TECH: CPT | Mod: 26 | Performed by: OBSTETRICS & GYNECOLOGY

## 2020-07-23 PROCEDURE — 25000128 H RX IP 250 OP 636: Performed by: NURSE ANESTHETIST, CERTIFIED REGISTERED

## 2020-07-23 PROCEDURE — 58661 LAPAROSCOPY REMOVE ADNEXA: CPT | Performed by: OBSTETRICS & GYNECOLOGY

## 2020-07-23 PROCEDURE — 37000009 ZZH ANESTHESIA TECHNICAL FEE, EACH ADDTL 15 MIN: Performed by: OBSTETRICS & GYNECOLOGY

## 2020-07-23 RX ORDER — HYDROCODONE BITARTRATE AND ACETAMINOPHEN 5; 325 MG/1; MG/1
1 TABLET ORAL
Status: CANCELLED | OUTPATIENT
Start: 2020-07-23

## 2020-07-23 RX ORDER — MEPERIDINE HYDROCHLORIDE 25 MG/ML
12.5 INJECTION INTRAMUSCULAR; INTRAVENOUS; SUBCUTANEOUS
Status: DISCONTINUED | OUTPATIENT
Start: 2020-07-23 | End: 2020-07-23 | Stop reason: HOSPADM

## 2020-07-23 RX ORDER — GLYCOPYRROLATE 0.2 MG/ML
INJECTION, SOLUTION INTRAMUSCULAR; INTRAVENOUS PRN
Status: DISCONTINUED | OUTPATIENT
Start: 2020-07-23 | End: 2020-07-23

## 2020-07-23 RX ORDER — BUPIVACAINE HYDROCHLORIDE 5 MG/ML
INJECTION, SOLUTION PERINEURAL PRN
Status: DISCONTINUED | OUTPATIENT
Start: 2020-07-23 | End: 2020-07-23 | Stop reason: HOSPADM

## 2020-07-23 RX ORDER — ACETAMINOPHEN 325 MG/1
650 TABLET ORAL
Status: CANCELLED | OUTPATIENT
Start: 2020-07-23

## 2020-07-23 RX ORDER — EPHEDRINE SULFATE 50 MG/ML
INJECTION, SOLUTION INTRAMUSCULAR; INTRAVENOUS; SUBCUTANEOUS PRN
Status: DISCONTINUED | OUTPATIENT
Start: 2020-07-23 | End: 2020-07-23

## 2020-07-23 RX ORDER — SODIUM CHLORIDE, SODIUM LACTATE, POTASSIUM CHLORIDE, CALCIUM CHLORIDE 600; 310; 30; 20 MG/100ML; MG/100ML; MG/100ML; MG/100ML
INJECTION, SOLUTION INTRAVENOUS CONTINUOUS PRN
Status: DISCONTINUED | OUTPATIENT
Start: 2020-07-23 | End: 2020-07-23

## 2020-07-23 RX ORDER — ONDANSETRON 2 MG/ML
4 INJECTION INTRAMUSCULAR; INTRAVENOUS EVERY 30 MIN PRN
Status: DISCONTINUED | OUTPATIENT
Start: 2020-07-23 | End: 2020-07-23 | Stop reason: HOSPADM

## 2020-07-23 RX ORDER — NALOXONE HYDROCHLORIDE 0.4 MG/ML
.1-.4 INJECTION, SOLUTION INTRAMUSCULAR; INTRAVENOUS; SUBCUTANEOUS
Status: DISCONTINUED | OUTPATIENT
Start: 2020-07-23 | End: 2020-07-23 | Stop reason: HOSPADM

## 2020-07-23 RX ORDER — DEXAMETHASONE SODIUM PHOSPHATE 4 MG/ML
INJECTION, SOLUTION INTRA-ARTICULAR; INTRALESIONAL; INTRAMUSCULAR; INTRAVENOUS; SOFT TISSUE PRN
Status: DISCONTINUED | OUTPATIENT
Start: 2020-07-23 | End: 2020-07-23

## 2020-07-23 RX ORDER — ALBUTEROL SULFATE 0.83 MG/ML
2.5 SOLUTION RESPIRATORY (INHALATION) EVERY 4 HOURS PRN
Status: DISCONTINUED | OUTPATIENT
Start: 2020-07-23 | End: 2020-07-23 | Stop reason: HOSPADM

## 2020-07-23 RX ORDER — ONDANSETRON 2 MG/ML
INJECTION INTRAMUSCULAR; INTRAVENOUS PRN
Status: DISCONTINUED | OUTPATIENT
Start: 2020-07-23 | End: 2020-07-23

## 2020-07-23 RX ORDER — CEFAZOLIN SODIUM 2 G/100ML
2 INJECTION, SOLUTION INTRAVENOUS
Status: COMPLETED | OUTPATIENT
Start: 2020-07-23 | End: 2020-07-23

## 2020-07-23 RX ORDER — FENTANYL CITRATE 50 UG/ML
INJECTION, SOLUTION INTRAMUSCULAR; INTRAVENOUS PRN
Status: DISCONTINUED | OUTPATIENT
Start: 2020-07-23 | End: 2020-07-23

## 2020-07-23 RX ORDER — HYDROCODONE BITARTRATE AND ACETAMINOPHEN 5; 325 MG/1; MG/1
1-2 TABLET ORAL EVERY 4 HOURS PRN
Qty: 10 TABLET | Refills: 0 | Status: SHIPPED | OUTPATIENT
Start: 2020-07-23 | End: 2020-08-11

## 2020-07-23 RX ORDER — FENTANYL CITRATE 50 UG/ML
25-50 INJECTION, SOLUTION INTRAMUSCULAR; INTRAVENOUS
Status: DISCONTINUED | OUTPATIENT
Start: 2020-07-23 | End: 2020-07-23 | Stop reason: HOSPADM

## 2020-07-23 RX ORDER — CEFAZOLIN SODIUM 1 G/3ML
1 INJECTION, POWDER, FOR SOLUTION INTRAMUSCULAR; INTRAVENOUS SEE ADMIN INSTRUCTIONS
Status: DISCONTINUED | OUTPATIENT
Start: 2020-07-23 | End: 2020-07-23 | Stop reason: HOSPADM

## 2020-07-23 RX ORDER — ONDANSETRON 4 MG/1
4 TABLET, ORALLY DISINTEGRATING ORAL EVERY 30 MIN PRN
Status: DISCONTINUED | OUTPATIENT
Start: 2020-07-23 | End: 2020-07-23 | Stop reason: HOSPADM

## 2020-07-23 RX ORDER — IBUPROFEN 600 MG/1
600 TABLET, FILM COATED ORAL EVERY 6 HOURS PRN
Qty: 30 TABLET | Refills: 0 | COMMUNITY
Start: 2020-07-23 | End: 2020-09-11

## 2020-07-23 RX ORDER — ACETAMINOPHEN 325 MG/1
975 TABLET ORAL ONCE
Status: COMPLETED | OUTPATIENT
Start: 2020-07-23 | End: 2020-07-23

## 2020-07-23 RX ORDER — ACETAMINOPHEN 325 MG/1
650 TABLET ORAL EVERY 4 HOURS PRN
Qty: 50 TABLET | Refills: 0 | COMMUNITY
Start: 2020-07-23 | End: 2020-09-11

## 2020-07-23 RX ORDER — LIDOCAINE HYDROCHLORIDE 20 MG/ML
INJECTION, SOLUTION INFILTRATION; PERINEURAL PRN
Status: DISCONTINUED | OUTPATIENT
Start: 2020-07-23 | End: 2020-07-23

## 2020-07-23 RX ORDER — SODIUM CHLORIDE, SODIUM LACTATE, POTASSIUM CHLORIDE, CALCIUM CHLORIDE 600; 310; 30; 20 MG/100ML; MG/100ML; MG/100ML; MG/100ML
INJECTION, SOLUTION INTRAVENOUS CONTINUOUS
Status: DISCONTINUED | OUTPATIENT
Start: 2020-07-23 | End: 2020-07-23 | Stop reason: HOSPADM

## 2020-07-23 RX ORDER — HYDROMORPHONE HYDROCHLORIDE 1 MG/ML
.3-.5 INJECTION, SOLUTION INTRAMUSCULAR; INTRAVENOUS; SUBCUTANEOUS EVERY 10 MIN PRN
Status: DISCONTINUED | OUTPATIENT
Start: 2020-07-23 | End: 2020-07-23 | Stop reason: HOSPADM

## 2020-07-23 RX ORDER — HYDROCODONE BITARTRATE AND ACETAMINOPHEN 5; 325 MG/1; MG/1
1 TABLET ORAL ONCE
Status: COMPLETED | OUTPATIENT
Start: 2020-07-23 | End: 2020-07-23

## 2020-07-23 RX ORDER — PROPOFOL 10 MG/ML
INJECTION, EMULSION INTRAVENOUS PRN
Status: DISCONTINUED | OUTPATIENT
Start: 2020-07-23 | End: 2020-07-23

## 2020-07-23 RX ORDER — ACETAMINOPHEN 650 MG/1
650 SUPPOSITORY RECTAL EVERY 4 HOURS PRN
Status: DISCONTINUED | OUTPATIENT
Start: 2020-07-23 | End: 2020-07-23 | Stop reason: HOSPADM

## 2020-07-23 RX ORDER — PROPOFOL 10 MG/ML
INJECTION, EMULSION INTRAVENOUS CONTINUOUS PRN
Status: DISCONTINUED | OUTPATIENT
Start: 2020-07-23 | End: 2020-07-23

## 2020-07-23 RX ORDER — NEOSTIGMINE METHYLSULFATE 1 MG/ML
VIAL (ML) INJECTION PRN
Status: DISCONTINUED | OUTPATIENT
Start: 2020-07-23 | End: 2020-07-23

## 2020-07-23 RX ADMIN — CEFAZOLIN SODIUM 2 G: 2 INJECTION, SOLUTION INTRAVENOUS at 07:38

## 2020-07-23 RX ADMIN — PHENYLEPHRINE HYDROCHLORIDE 100 MCG: 10 INJECTION INTRAVENOUS at 08:36

## 2020-07-23 RX ADMIN — ACETAMINOPHEN 975 MG: 325 TABLET, FILM COATED ORAL at 06:00

## 2020-07-23 RX ADMIN — NEOSTIGMINE METHYLSULFATE 4.5 MG: 1 INJECTION, SOLUTION INTRAVENOUS at 08:36

## 2020-07-23 RX ADMIN — LIDOCAINE HYDROCHLORIDE 40 MG: 20 INJECTION, SOLUTION INFILTRATION; PERINEURAL at 07:34

## 2020-07-23 RX ADMIN — ROCURONIUM BROMIDE 50 MG: 10 INJECTION INTRAVENOUS at 07:34

## 2020-07-23 RX ADMIN — PROPOFOL 180 MCG/KG/MIN: 10 INJECTION, EMULSION INTRAVENOUS at 07:34

## 2020-07-23 RX ADMIN — SODIUM CHLORIDE, POTASSIUM CHLORIDE, SODIUM LACTATE AND CALCIUM CHLORIDE: 600; 310; 30; 20 INJECTION, SOLUTION INTRAVENOUS at 08:35

## 2020-07-23 RX ADMIN — DEXMEDETOMIDINE HYDROCHLORIDE 0.5 MCG/KG/HR: 100 INJECTION, SOLUTION INTRAVENOUS at 07:31

## 2020-07-23 RX ADMIN — FENTANYL CITRATE 50 MCG: 0.05 INJECTION, SOLUTION INTRAMUSCULAR; INTRAVENOUS at 09:30

## 2020-07-23 RX ADMIN — ONDANSETRON 4 MG: 2 INJECTION INTRAMUSCULAR; INTRAVENOUS at 08:20

## 2020-07-23 RX ADMIN — Medication 5 MG: at 08:31

## 2020-07-23 RX ADMIN — GLYCOPYRROLATE 0.8 MG: 0.2 INJECTION, SOLUTION INTRAMUSCULAR; INTRAVENOUS at 08:36

## 2020-07-23 RX ADMIN — MIDAZOLAM 2 MG: 1 INJECTION INTRAMUSCULAR; INTRAVENOUS at 07:31

## 2020-07-23 RX ADMIN — HYDROCODONE BITARTRATE AND ACETAMINOPHEN 1 TABLET: 5; 325 TABLET ORAL at 09:59

## 2020-07-23 RX ADMIN — HYDROMORPHONE HYDROCHLORIDE 0.5 MG: 1 INJECTION, SOLUTION INTRAMUSCULAR; INTRAVENOUS; SUBCUTANEOUS at 10:17

## 2020-07-23 RX ADMIN — FENTANYL CITRATE 50 MCG: 0.05 INJECTION, SOLUTION INTRAMUSCULAR; INTRAVENOUS at 09:53

## 2020-07-23 RX ADMIN — DEXAMETHASONE SODIUM PHOSPHATE 4 MG: 4 INJECTION, SOLUTION INTRA-ARTICULAR; INTRALESIONAL; INTRAMUSCULAR; INTRAVENOUS; SOFT TISSUE at 07:41

## 2020-07-23 RX ADMIN — FENTANYL CITRATE 100 MCG: 50 INJECTION, SOLUTION INTRAMUSCULAR; INTRAVENOUS at 07:34

## 2020-07-23 RX ADMIN — PROPOFOL 200 MG: 10 INJECTION, EMULSION INTRAVENOUS at 07:34

## 2020-07-23 RX ADMIN — SODIUM CHLORIDE, POTASSIUM CHLORIDE, SODIUM LACTATE AND CALCIUM CHLORIDE: 600; 310; 30; 20 INJECTION, SOLUTION INTRAVENOUS at 07:31

## 2020-07-23 ASSESSMENT — MIFFLIN-ST. JEOR: SCORE: 1707.24

## 2020-07-23 NOTE — ANESTHESIA CARE TRANSFER NOTE
Patient: Christine Dubose    Procedure(s):  LAPAROSCOPIC BILATERAL OOPHORECTOMY    Diagnosis: Cyst of left ovary [N83.202]  Diagnosis Additional Information: No value filed.    Anesthesia Type:   General     Note:  Airway :Face Mask  Patient transferred to:PACU  Comments: Neuromuscular blockade reversed after TOF 4/4, spontaneous respirations, adequate tidal volumes, followed commands to voice, extubated atraumatically, extubated with suction, airway patent after extubation.  Oxygen via facemask at 6 liters per minute to PACU. Oxygen tubing connected to wall O2 in PACU, SpO2, NiBP, and EKG monitors and alarms on and functioning, Rod Hugger warmer connected to patient gown, report on patient's clinical status given to PACU RN, RN questions answered.     /63  HR 73  RR 20  O2 93%    Handoff Report: Identifed the Patient, Identified the Reponsible Provider, Reviewed the pertinent medical history, Discussed the surgical course, Reviewed Intra-OP anesthesia mangement and issues during anesthesia, Set expectations for post-procedure period and Allowed opportunity for questions and acknowledgement of understanding      Vitals: (Last set prior to Anesthesia Care Transfer)    CRNA VITALS  7/23/2020 0833 - 7/23/2020 0909      7/23/2020             Resp Rate (set):  10                Electronically Signed By: Christine Marie Volp Hodgkins, CRNA, APRN CRNA  July 23, 2020  9:09 AM

## 2020-07-23 NOTE — ANESTHESIA POSTPROCEDURE EVALUATION
Patient: Christine Dubose    Procedure(s):  LAPAROSCOPIC BILATERAL OOPHORECTOMY    Diagnosis:Cyst of left ovary [N83.202]  Diagnosis Additional Information: No value filed.    Anesthesia Type:  General    Note:  Anesthesia Post Evaluation    Patient location during evaluation: PACU  Patient participation: Able to fully participate in evaluation  Level of consciousness: awake  Pain management: adequate  Airway patency: patent  Cardiovascular status: acceptable  Respiratory status: acceptable  Hydration status: acceptable  PONV: controlled     Anesthetic complications: None          Last vitals:  Vitals:    07/23/20 1015 07/23/20 1030 07/23/20 1108   BP: 116/69 112/67 112/70   Pulse: 65 63    Resp: 19 10 16   Temp: 36.4  C (97.6  F)     SpO2: 94% 95% 92%         Electronically Signed By: Isabel Curtis MD  July 23, 2020  11:24 AM

## 2020-07-23 NOTE — ANESTHESIA PREPROCEDURE EVALUATION
Anesthesia Pre-Procedure Evaluation    Patient: Christine Dubose   MRN: [id number] : 1970          Preoperative Diagnosis: Cyst of left ovary [N83.202]    Procedure(s):  LAPAROSCOPIC BILATERAL OOPHORECTOMY    Past Medical History:   Diagnosis Date     Arthritis      Chronic pain     CHRONIC NECK PAIN     Depression     on Lexapro     Dysfunctional uterine bleeding      Heart murmur      Hypertension      Menorrhagia      Miscarriage 2001     MVP (mitral valve prolapse)     Needs antibiotics for procedures     Other chronic pain      Other forms of migraine, without mention of intractable migraine without mention of status migrainosus     takes Zomig     Ovarian cyst 2019    found in ER, L side     Seizures (H)     Had seizure in . Was on Tegretol, but discontinued the meds on her own.Sees Dr. Gume Morgan at [facility].Was started on Depakote in  for migrines in addition to Zomig     Stress incontinence, female 2009    Dr. Kurtz-Cystocoele repair and pubovag slling     Thyroid disease     on synthroid-Hashimotos thyroiditis; sees Lorenza     Past Surgical History:   Procedure Laterality Date     ARTHROPLASTY KNEE UNICOMPARTMENT Right 2015    Procedure: ARTHROPLASTY KNEE UNICOMPARTMENT;  Surgeon: Pablo Lara MD;  Location:  OR     ARTHROPLASTY KNEE UNICOMPARTMENT Left 10/27/2015    Procedure: ARTHROPLASTY KNEE UNICOMPARTMENT;  Surgeon: Pablo Lara MD;  Location:  OR      SECTION  1997    for breech     CYCTOCELE REPAIR       CYSTOSCOPY  2014    Procedure: CYSTOSCOPY;;  Surgeon: Candie Crandall MD;  Location:  SD     DECOMPRESSION LUMBAR ONE LEVEL Right 2015    Procedure: DECOMPRESSION LUMBAR ONE LEVEL;  Surgeon: Elio Starr MD;  Location:  OR     DILATION AND CURETTAGE, OPERATIVE HYSTEROSCOPY, COMBINED  3/8/2012    Procedure:COMBINED DILATION AND CURETTAGE, OPERATIVE HYSTEROSCOPY; OPERATIVE HYSTEROSCOPY,  DILATION AND CURETTAGE, POSSIBLE RESECTION OF POLYP; Surgeon:AD CRANDALL; Location:[facility]     ENT SURGERY      TONSILLECTOMY     GENITOURINARY SURGERY      BLADDER SLING     LAPAROSCOPIC HYSTERECTOMY SUPRACERVICAL  4/9/2014    Procedure: LAPAROSCOPIC HYSTERECTOMY SUPRACERVICAL;  LAPAROSCOPIC ASSISTED SUPRACERVICAL HYSTERECTOMY, cystoscopy;  Surgeon: Ad Crandall MD;  Location: [facility]     STRIP VEIN  11/2010     TONSILLECTOMY  1974       Anesthesia Evaluation     . Pt has had prior anesthetic.     No history of anesthetic complications          ROS/MED HX    ENT/Pulmonary:      (-) sleep apnea   Neurologic:     (+)migraines, seizures     Cardiovascular: Comment: ECG-SR, low voltage, neg Ts    (+) hypertension----. : . . . :. valvular problems/murmurs type: MVP no sx:.       METS/Exercise Tolerance:  >4 METS   Hematologic:         Musculoskeletal:   (+) arthritis,  -       GI/Hepatic:        (-) GERD   Renal/Genitourinary:         Endo: Comment: DUB    (+) thyroid problem Obesity, .   (-) chronic steroid usage   Psychiatric:     (+) psychiatric history depression and anxiety (PTSD)      Infectious Disease:         Malignancy:         Other:    (+) H/O Chronic Pain,                        Physical Exam      Airway   Mallampati: II  TM distance: >3 FB  Neck ROM: full    Dental   (+) caps    Cardiovascular   Rhythm and rate: regular      Pulmonary    breath sounds clear to auscultation            Lab Results   Component Value Date    WBC 9.5 05/03/2019    HGB 13.5 05/03/2019    HCT 40.6 05/03/2019     05/03/2019     05/03/2019    POTASSIUM 4.1 05/03/2019    CHLORIDE 107 05/03/2019    CO2 27 05/03/2019    BUN 14 05/03/2019    CR 0.77 05/03/2019    GLC 94 05/03/2019    ANSHUL 8.9 05/03/2019    MAG 2.2 03/03/2006    ALBUMIN 3.6 04/30/2018    PROTTOTAL 7.4 04/30/2018    ALT 25 04/30/2018    AST 13 04/30/2018    ALKPHOS 73 04/30/2018    BILITOTAL 0.6 04/30/2018    TSH 0.44 01/30/2020     "T4 1.24 04/30/2018    HCG Negative 04/09/2014    HCGS Negative 03/03/2006       Preop Vitals  BP Readings from Last 3 Encounters:   07/10/20 120/80   06/30/20 128/80   06/25/20 124/82    Pulse Readings from Last 3 Encounters:   07/10/20 84   06/30/20 68   06/25/20 76      Resp Readings from Last 3 Encounters:   03/31/19 18   04/30/18 12   10/28/15 16    SpO2 Readings from Last 3 Encounters:   07/10/20 97%   01/30/20 99%   08/29/19 95%      Temp Readings from Last 1 Encounters:   07/10/20 36.1  C (97  F) (Tympanic)    Ht Readings from Last 1 Encounters:   07/10/20 1.676 m (5' 6\")      Wt Readings from Last 1 Encounters:   07/10/20 107.5 kg (237 lb)    Estimated body mass index is 38.25 kg/m  as calculated from the following:    Height as of 7/10/20: 1.676 m (5' 6\").    Weight as of 7/10/20: 107.5 kg (237 lb).     Allergies   Allergen Reactions     Imitrex [Sumatriptan] Other (See Comments)     Throat tightness     Social History     Tobacco Use     Smoking status: Never Smoker     Smokeless tobacco: Never Used   Substance Use Topics     Alcohol use: Yes     Alcohol/week: 0.0 standard drinks     Comment: Socially     Prior to Admission medications    Medication Sig Start Date End Date Taking? Authorizing Provider   Calcium-Magnesium-Zinc 333-133-5 MG TABS Take 1 tablet by mouth At Bedtime   Yes Reported, Patient   escitalopram (LEXAPRO) 20 MG tablet TAKE 1/2 TO 1 TABLET(10 TO 20 MG) BY MOUTH EVERY EVENING 4/20/20  Yes Prudencio Conde MD   IRON-FOLIC ACID PO    Yes Reported, Patient   KRILL OIL PO Take 1 capsule by mouth every evening    Yes Reported, Patient   levothyroxine (SYNTHROID/LEVOTHROID) 137 MCG tablet TAKE 1 TABLET BY MOUTH DAILY 7/20/20  Yes Prudencio Conde MD   lisinopril (ZESTRIL) 10 MG tablet TAKE 1 TABLET BY MOUTH DAILY 6/26/20  Yes Prudencio Conde MD   meloxicam (MOBIC) 15 MG tablet  6/10/20  Yes Reported, Patient   NIFEdipine ER OSMOTIC (PROCARDIA XL) 60 MG 24 hr tablet TAKE 1 TABLET BY MOUTH " DAILY 6/26/20  Yes Prudencio Conde MD   Pediatric Multivit-Minerals-C (MULTIVITAMIN GUMMIES CHILDRENS PO) Take 2 chew tab by mouth daily as needed   Yes Reported, Patient   olopatadine (PATANOL) 0.1 % ophthalmic solution Place 1 drop into both eyes 2 times daily 5/3/19   Prudencio Conde MD     Current Facility-Administered Medications Ordered in Epic   Medication Dose Route Frequency Last Rate Last Dose     ceFAZolin (ANCEF) 1 g vial to attach to  ml bag for ADULT or 50 ml bag for PEDS  1 g Intravenous See Admin Instructions         ceFAZolin (ANCEF) intermittent infusion 2 g in 100 mL dextrose PRE-MIX  2 g Intravenous Pre-Op/Pre-procedure x 1 dose         No current [facility]-ordered outpatient medications on file.       Recent Labs   Lab Test 05/03/19  1039 03/31/19  1541    137   POTASSIUM 4.1 3.9   CHLORIDE 107 104   CO2 27 28   ANIONGAP 5 5   GLC 94 85   BUN 14 11   CR 0.77 0.65   ANSHUL 8.9 9.3     Recent Labs   Lab Test 05/03/19  1039 03/31/19  1541   WBC 9.5 8.9   HGB 13.5 15.1    270     No results for input(s): ABO, RH in the last 27244 hours.  No results for input(s): TROPI in the last 05930 hours.  No results for input(s): PH, PCO2, PO2, HCO3 in the last 29585 hours.  Recent Labs   Lab Test 04/09/14  0810   HCG Negative     No results found for this or any previous visit (from the past 744 hour(s)).  No results found for this or any previous visit (from the past 4320 hour(s)).      RECENT LABS:         Anesthesia Plan      History & Physical Review  History and physical reviewed and following examination; no interval change.    ASA Status:  3 .        Plan for General   PONV prophylaxis:  Ondansetron (or other 5HT-3) and Dexamethasone or Solumedrol  Dexmed infusion        Postoperative Care      Consents  Anesthetic plan, risks, benefits and alternatives discussed with:  Patient..                 Isabel Curtis MD

## 2020-07-23 NOTE — OP NOTE
Procedure Date: 07/23/2020      PREOPERATIVE DIAGNOSES:   1.  Left lower quadrant pain.   2.  Left ovarian cyst.  3. Cervical cyst      POSTOPERATIVE DIAGNOSES:     1.  Left lower quadrant pain.     2.  Left ovarian cyst.  3. Cervical cyst      PROCEDURES PERFORMED:  Bilateral laparoscopic oophorectomy.  Aspiration of cervical cyst      SURGEON:  Christiana Andersen MD      ASSISTANT:  Dr. Vargas.  Dr. Vargas's assistance was needed for visualization and retraction.      ANESTHESIA:  General.      COMPLICATIONS:  None.      ESTIMATED BLOOD LOSS:  5 mL      FINDINGS:  Absent uterus.  Cervical stump with simple appearing cyst at its apex, which was drained for clear fluid.  Right ovary tethered to the right pelvic sidewall and normal in appearance.  Left ovary and fallopian tube similarly tethered to the left pelvic sidewall, with a 2 cm hemorrhagic cyst at the upper pole of the ovary and simple cysts noted on lower pole.  Remainder of the upper abdomen and pelvis within normal limits.      INDICATIONS FOR PROCEDURE:  Christine is a 50-year-old woman who is status post laparoscopic supracervical hysterectomy in 2014, who was seen in our office for left lower quadrant pain.  Initial visit was in 05/2019, at which time she was found to have a left ovarian cyst, which later was felt to have resolved.  Ebony visited our clinic again in 06/2020 with similar left lower quadrant pain and was again found to have a 3.7 x 3.3 x 3 cm left simple ovarian cyst.  Due to ongoing pain, Candie did desire surgical removal of this and given her age and postmenopausal status, decision was made to remove bilateral ovaries.  Risks, benefits and alternatives were discussed in detail.      DESCRIPTION OF PROCEDURE:  Christine was taken to the operating room, where general anesthesia was administered without difficulty.  She was prepared and draped in the normal sterile fashion in dorsal lithotomy position.  A timeout was performed to identify correct  patient and procedure.  A sponge stick was placed into the vaginal canal for potential cervical manipulation and Wright catheter was placed.  Attention was then turned to the abdomen, where infraumbilical region was anesthetized with approximately 7 mL of Marcaine and incision made.  Peritoneal entry was made using the Veress needle and confirmed with hanging drop saline method test.  The abdomen was insufflated with carbon dioxide with an opening pressure of 5 mmHg.  Four liters of CO2 were instilled.  The abdomen was then inspected with findings as noted above.  Under direct visualization, bilateral lower quadrant ports were placed after local anesthetic placement; 5 mm ports were placed.  Pelvic washings were collected.  Left fallopian tube and ovary were then retracted, cauterized and cut at the IP ligament.  These were placed into a 10 mm EndoCatch bag without difficulty.  Attention was similarly turned to the right ovary, which was easily retracted, cauterized, cut and removed.  This was placed in a separate 5 mm EndoCatch bag.  A simple cyst at the apex of the cervix was then opened and drained for approximately 5cc clear fluid.  The 5 mm ports were then removed under direct visualization.  In order to remove the bag, the umbilical incision was extended to approximately 3 cm using sharp dissection.  Both specimens were removed intact.  The fascia was reapproximated using an 0 Vicryl in a running suture.  Subcuticular fat was reapproximated and skin was closed using a 4-0 Monocryl.  Bilateral lower quadrant ports were similarly closed.  Bandages were placed.  Wright catheter was removed and Christine was taken to the recovery room in stable condition.         REHANA HAMM MD             D: 2020   T: 2020   MT: KONRAD      Name:     CHRISTINE SCHAEFFER   MRN:      [id number]        Account:        [id number]   :      1970           Procedure Date: 2020      Document: [id number]       cc:  Christiana Andersen MD

## 2020-07-23 NOTE — BRIEF OP NOTE
[facility]    Brief Operative Note    Pre-operative diagnosis: Cyst of left ovary [N83.202]  Post-operative diagnosis left ovarian cyst    Procedure: Procedure(s):  LAPAROSCOPIC BILATERAL OOPHORECTOMY  Surgeon: Surgeon(s) and Role:     * Christiana Andersen MD - Primary     * Estrella Vargas MD - Assisting  Anesthesia: General   Estimated blood loss: 5mL  Drains: None  Specimens:   ID Type Source Tests Collected by Time Destination   1 : PELVIC WASHINGS Washings Pelvis CYTOLOGY NON GYN Christiana Andersen MD 7/23/2020  8:10 AM    A : RIGHT OVARY Tissue Ovary, Right SURGICAL PATHOLOGY EXAM Christiana Andersen MD 7/23/2020  8:25 AM    B : LEFT OVARY Tissue Ovary, Left SURGICAL PATHOLOGY EXAM Christiana Andersen MD 7/23/2020  8:26 AM      Findings:   Absent uterus.  Cervical stump with 2cm simple cyst at apex.  Right ovary tethered to right side wall and normal. Left ovary similarily tethered to left side wall with 2cm hemorrhagic appearing cyst on upper pole of ovary and 2cm simple cyst on lower pole.  Remainder of pelvis and upper abdomen normal..  Complications: None.  Implants: * No implants in log *

## 2020-07-23 NOTE — DISCHARGE INSTRUCTIONS
Today you were given 975 mg of Tylenol at 0600. The recommended daily maximum dose is 4000 mg.     Same Day Surgery Discharge Instructions for  Sedation and General Anesthesia       It's not unusual to feel dizzy, light-headed or faint for up to 24 hours after surgery or while taking pain medication.  If you have these symptoms: sit for a few minutes before standing and have someone assist you when you get up to walk or use the bathroom.      You should rest and relax for the next 24 hours. We recommend you make arrangements to have an adult stay with you for at least 24 hours after your discharge.  Avoid hazardous and strenuous activity.      DO NOT DRIVE any vehicle or operate mechanical equipment for 24 hours following the end of your surgery.  Even though you may feel normal, your reactions may be affected by the medication you have received.      Do not drink alcoholic beverages for 24 hours following surgery.       Slowly progress to your regular diet as you feel able. It's not unusual to feel nauseated and/or vomit after receiving anesthesia.  If you develop these symptoms, drink clear liquids (apple juice, ginger ale, broth, 7-up, etc. ) until you feel better.  If your nausea and vomiting persists for 24 hours, please notify your surgeon.        All narcotic pain medications, along with inactivity and anesthesia, can cause constipation. Drinking plenty of liquids and increasing fiber intake will help.      For any questions of a medical nature, call your surgeon.      Do not make important decisions for 24 hours.      If you had general anesthesia, you may have a sore throat for a couple of days related to the breathing tube used during surgery.  You may use Cepacol lozenges to help with this discomfort.  If it worsens or if you develop a fever, contact your surgeon.       If you feel your pain is not well managed with the pain medications prescribed by your surgeon, please contact your surgeon's office to  let them know so they can address your concerns.       CoVid 19 Information    We want to give you information regarding Covid. Please consult your primary care provider with any questions you might have.     Patient who have symptoms (cough, fever, or shortness of breath), need to isolate for 7 days from when symptoms started OR 72 hours after fever resolves (without fever reducing medications) AND improvement of respiratory symptoms (whichever is longer).      Isolate yourself at home (in own room/own bathroom if possible)    Do Not allow any visitors    Do Not go to work or school    Do Not go to [employer],  centers, shopping, or other public places.    Do Not shake hands.    Avoid close and intimate contact with others (hugging, kissing).    Follow CDC recommendations for household cleaning of frequently touched services.     After the initial 7 days, continue to isolate yourself from household members as much as possible. To continue decrease the risk of community spread and exposure, you and any members of your household should limit activities in public for 14 days after starting home isolation.     You can reference the following CDC link for helpful home isolation/care tips:  https://www.cdc.gov/coronavirus/2019-ncov/downloads/10Things.pdf    Protect Others:    Cover Your Mouth and Nose with a mask, disposable tissue or wash cloth to avoid spreading germs to others.    Wash your hands and face frequently with soap and water    Call Your Primary Doctor If: Breathing difficulty develops or you become worse.    For more information about COVID19 and options for caring for yourself at home, please visit the CDC website at https://www.cdc.gov/coronavirus/2019-ncov/about/steps-when-sick.html  For more options for care at [facility], please visit our website at [email or web address]    DISCHARGE INSTRUCTIONS FOLLOWING LAPAROSCOPY    ACTIVITY:  You may resume normal  activities including lifting as your abdominal discomfort disappears.  You may return to work after two days if you feel well enough.  Possible abdominal and shoulder discomfort due to gas remaining in the abdomen should be gone within 48 hours.  It is permissible  to climb stairs.  Showers are perfectly acceptable. You may drive a car after 24 hours as long as you are not taking narcotic pain pills.     CHECK-UP:  You should be seen one month after discharge unless home instruction sheet states otherwise and please phone the office the day after discharge and schedule an appointment with your physician.    VAGINAL DISCHARGE:  You may have some vaginal bleeding or discharge for about a week after discharge.  You should avoid douches, tampons, and intercourse for the first week.    TEMPERATURE:  If you develop temperature levels to over 100.4 F your physician should be called immediately.    STITCHES:  There is usually a stitch under the skin incisions which will dissolve and do not need to be removed.  The bandaids may be removed at any time.    DIET:  [place] or light diet is advisable the day of surgery.  If nausea persists, continue this diet.  If severe, call.    Please call the office for increasingly severe abdominal pain or vaginal bleeding in excess of one pad per hour.  This will rarely be a problem.    [facility]  [phone]

## 2020-07-24 LAB — COPATH REPORT: NORMAL

## 2020-07-27 LAB — COPATH REPORT: NORMAL

## 2020-08-11 ENCOUNTER — OFFICE VISIT (OUTPATIENT)
Dept: OBGYN | Facility: CLINIC | Age: 50
End: 2020-08-11
Payer: COMMERCIAL

## 2020-08-11 ENCOUNTER — TELEPHONE (OUTPATIENT)
Dept: OBGYN | Facility: CLINIC | Age: 50
End: 2020-08-11

## 2020-08-11 VITALS — WEIGHT: 235 LBS | BODY MASS INDEX: 37.93 KG/M2 | SYSTOLIC BLOOD PRESSURE: 122 MMHG | DIASTOLIC BLOOD PRESSURE: 78 MMHG

## 2020-08-11 DIAGNOSIS — T81.49XA INCISIONAL ABSCESS: Primary | ICD-10-CM

## 2020-08-11 PROCEDURE — 99213 OFFICE O/P EST LOW 20 MIN: CPT | Performed by: OBSTETRICS & GYNECOLOGY

## 2020-08-11 RX ORDER — CEPHALEXIN 500 MG/1
500 CAPSULE ORAL 2 TIMES DAILY
Qty: 10 CAPSULE | Refills: 0 | Status: SHIPPED | OUTPATIENT
Start: 2020-08-11 | End: 2020-08-16

## 2020-08-11 NOTE — TELEPHONE ENCOUNTER
"7/23/20 LAPAROSCOPIC BILATERAL OOPHORECTOMY    Incision at the umbilicus - hard, firm on both sides, more painful  Last night had a \"pimple on it\" and broke open and a bunch of \"puss came out\" Area is red and feels like there is more behind the firmness.  Denies fever.  Dr Andersen will see pt at 1630 today.  Pt okay with plan.    Vkitoriya Del Rosario RN on 8/11/2020 at 11:00 AM      "

## 2020-08-11 NOTE — PROGRESS NOTES
"    SUBJECTIVE:                                                   Christine Dubose is a 50 year old female who presents to clinic today for the following health issue(s):  Patient presents with:  Follow Up: incision check,  LAPAROSCOPIC BILATERAL OOPHORECTOMY. noticed a pimple like bump and popped it, clear liquid, painful, redness, puffed out-feels firm on both sides. Denies fever or chills      HPI:  Here today for incision check.  Umibilical incision has been a bit more tender since procedure with me .  Noted more pain/tenderness and firm area with overlying \"pimple\" yesterday.  Opened and drained pinpoint amount of yellowish discharge; has had small amount clear drainage today.  No bleeding.  Mild redness; no warmth.  No foul odor.  Feels slightly better with drainage but still sore.    Otherwise feeling well    Patient's last menstrual period was 2014..     Patient is sexually active, .  Using menopause for contraception.    reports that she has never smoked. She has never used smokeless tobacco.    STD testing offered?  Declined    Health maintenance updated:  yes      Problem list and histories reviewed & adjusted, as indicated.  Additional history: as documented.    Patient Active Problem List   Diagnosis     CARDIOVASCULAR SCREENING; LDL GOAL LESS THAN 160     Hypertension goal BP (blood pressure) < 130/80     Migraine     Osteoarthritis     Knee joint replacement by other means     Abnormal gait     Low back pain     Synovial cyst     History of arthroplasty of left knee     Posttraumatic stress disorder     Hypothyroidism, unspecified type     Arthritis of right hand     Arthralgia of right acromioclavicular joint     Morbid obesity (H)     Anxiety     Left ovarian cyst     Cyst of left ovary     Past Surgical History:   Procedure Laterality Date     ARTHROPLASTY KNEE UNICOMPARTMENT Right 2015    Procedure: ARTHROPLASTY KNEE UNICOMPARTMENT;  Surgeon: Pablo Lara MD;  " Location:  OR     ARTHROPLASTY KNEE UNICOMPARTMENT Left 10/27/2015    Procedure: ARTHROPLASTY KNEE UNICOMPARTMENT;  Surgeon: Pablo Lara MD;  Location:  OR      SECTION  1997    for breech     CYCTOCELE REPAIR       CYSTOSCOPY  2014    Procedure: CYSTOSCOPY;;  Surgeon: Candie Crandall MD;  Location: [facility]     DECOMPRESSION LUMBAR ONE LEVEL Right 2015    Procedure: DECOMPRESSION LUMBAR ONE LEVEL;  Surgeon: Elio Starr MD;  Location:  OR     DILATION AND CURETTAGE, OPERATIVE HYSTEROSCOPY, COMBINED  3/8/2012    Procedure:COMBINED DILATION AND CURETTAGE, OPERATIVE HYSTEROSCOPY; OPERATIVE HYSTEROSCOPY, DILATION AND CURETTAGE, POSSIBLE RESECTION OF POLYP; Surgeon:CANDIE CRANDALL; Location:[facility]     ENT SURGERY      TONSILLECTOMY     GENITOURINARY SURGERY      BLADDER SLING     LAPAROSCOPIC HYSTERECTOMY SUPRACERVICAL  2014    Procedure: LAPAROSCOPIC HYSTERECTOMY SUPRACERVICAL;  LAPAROSCOPIC ASSISTED SUPRACERVICAL HYSTERECTOMY, cystoscopy;  Surgeon: Candie Crandall MD;  Location: [facility]     LAPAROSCOPIC OOPHORECTOMY Bilateral 2020    Procedure: LAPAROSCOPIC BILATERAL OOPHORECTOMY;  Surgeon: Christiana Andersen MD;  Location:  OR     STRIP VEIN  2010     TONSILLECTOMY  1974      Social History     Tobacco Use     Smoking status: Never Smoker     Smokeless tobacco: Never Used   Substance Use Topics     Alcohol use: Yes     Alcohol/week: 0.0 standard drinks     Comment: Socially      Problem (# of Occurrences) Relation (Name,Age of Onset)    Cerebrovascular Disease (1) Paternal Grandmother: TIA    Diabetes (4) Maternal Grandmother (Amy), Maternal Grandfather (Willis), Paternal Grandmother, Paternal Grandfather    Fractures (1) Maternal Grandmother (Amy): Hip    Hyperlipidemia (2) Mother (Raiza): high triglycerides, Father (Remy): not on meds    Hypertension (4) Mother (Raiza), Father (Remy), Maternal Grandmother (Amy),  Paternal Grandmother    Hypothyroidism (1) Daughter    Lung Cancer (1) Maternal Grandfather (Willis, 68): smoker    No Known Problems (3) Son, Son, Son    Osteoporosis (1) Maternal Grandmother (Amy)    Stomach Cancer (1) Paternal Grandfather (68)    Thyroid Disease (1) Mother (Raiza)       Negative family history of: Coronary Artery Disease, Breast Cancer, Colon Cancer            Current Outpatient Medications   Medication Sig     acetaminophen (TYLENOL) 325 MG tablet Take 2 tablets (650 mg) by mouth every 4 hours as needed for mild pain     Calcium-Magnesium-Zinc 333-133-5 MG TABS Take 1 tablet by mouth At Bedtime     escitalopram (LEXAPRO) 20 MG tablet TAKE 1/2 TO 1 TABLET(10 TO 20 MG) BY MOUTH EVERY EVENING     ibuprofen (ADVIL/MOTRIN) 600 MG tablet Take 1 tablet (600 mg) by mouth every 6 hours as needed for other (mild and/or inflammatory pain)     IRON-FOLIC ACID PO      KRILL OIL PO Take 1 capsule by mouth every evening      levothyroxine (SYNTHROID/LEVOTHROID) 137 MCG tablet TAKE 1 TABLET BY MOUTH DAILY     lisinopril (ZESTRIL) 10 MG tablet TAKE 1 TABLET BY MOUTH DAILY     meloxicam (MOBIC) 15 MG tablet      NIFEdipine ER OSMOTIC (PROCARDIA XL) 60 MG 24 hr tablet TAKE 1 TABLET BY MOUTH DAILY     olopatadine (PATANOL) 0.1 % ophthalmic solution Place 1 drop into both eyes 2 times daily     Pediatric Multivit-Minerals-C (MULTIVITAMIN GUMMIES CHILDRENS PO) Take 2 chew tab by mouth daily as needed     No current facility-administered medications for this visit.      Allergies   Allergen Reactions     Imitrex [Sumatriptan] Other (See Comments)     Throat tightness       ROS:  12 point review of systems negative other than symptoms noted below or in the HPI.  No urinary frequency or dysuria, bladder or kidney problems      OBJECTIVE:     /78   Wt 106.6 kg (235 lb)   LMP 03/17/2014   Breastfeeding No   BMI 37.93 kg/m    Body mass index is 37.93 kg/m .    Exam:  Constitutional:  Appearance: Well  nourished, well developed alert, in no acute distress  Gastrointestinal:  Abdominal Examination:  Abdomen nontender to palpation, tone normal without rigidity or guarding, no masses present, umbilicus without lesions; Liver/Spleen:  No hepatomegaly present, liver nontender to palpation; Hernias:  No hernias present  Skin: General Inspection:  No rashes present, no lesions present, no areas of discoloration.  UMBILICAL INCISION WITH SOME REDNESS/INDURATION ON LEFT SIDE; SMALL (2-3MM) FLUCTUANCE NOTED AT SURFACE; ~MARBLE SIZE LUMP BENEATH.  SMALL AMOUNT YELLOWISH DISCHARGE EXPRESSED; OTHER INCISIONS WELL HEALED  Neurologic:  Mental Status:  Oriented X3.  Normal strength and tone, sensory exam grossly normal, mentation intact and speech normal.    Psychiatric:  Mentation appears normal and affect normal/bright.     In-Clinic Test Results:  No results found for this or any previous visit (from the past 24 hour(s)).    ASSESSMENT/PLAN:                                                        ICD-10-CM    1. Incisional abscess  T81.49XA        Patient Instructions   Will treat conservatively at this time given her incision has started to drain a bit; will treat with 5d course of keflex and monitor closely.  Can use warm compresses with gentle massage to help area continue to drain.  If area worsens, enlarges, becomes more red or warm, etc, will return for I&D of umbilical incision.      Christiana Andersen MD  [facility]

## 2020-08-11 NOTE — TELEPHONE ENCOUNTER
Patient asked if she could speak with a nurse, thinks her scar from her surgery may be infected. Please call back.

## 2020-08-11 NOTE — PATIENT INSTRUCTIONS
Will treat conservatively at this time given her incision has started to drain a bit; will treat with 5d course of keflex and monitor closely.  Can use warm compresses with gentle massage to help area continue to drain.  If area worsens, enlarges, becomes more red or warm, etc, will return for I&D of umbilical incision.

## 2020-09-09 ENCOUNTER — TRANSFERRED RECORDS (OUTPATIENT)
Dept: HEALTH INFORMATION MANAGEMENT | Facility: CLINIC | Age: 50
End: 2020-09-09

## 2020-09-11 ENCOUNTER — OFFICE VISIT (OUTPATIENT)
Dept: OBGYN | Facility: CLINIC | Age: 50
End: 2020-09-11
Payer: COMMERCIAL

## 2020-09-11 VITALS
HEIGHT: 66 IN | SYSTOLIC BLOOD PRESSURE: 124 MMHG | DIASTOLIC BLOOD PRESSURE: 86 MMHG | WEIGHT: 236 LBS | BODY MASS INDEX: 37.93 KG/M2

## 2020-09-11 DIAGNOSIS — Z01.419 ENCOUNTER FOR GYNECOLOGICAL EXAMINATION WITHOUT ABNORMAL FINDING: Primary | ICD-10-CM

## 2020-09-11 DIAGNOSIS — Z23 NEED FOR PROPHYLACTIC VACCINATION AND INOCULATION AGAINST INFLUENZA: ICD-10-CM

## 2020-09-11 PROCEDURE — 90471 IMMUNIZATION ADMIN: CPT | Performed by: OBSTETRICS & GYNECOLOGY

## 2020-09-11 PROCEDURE — 99396 PREV VISIT EST AGE 40-64: CPT | Performed by: OBSTETRICS & GYNECOLOGY

## 2020-09-11 PROCEDURE — 90682 RIV4 VACC RECOMBINANT DNA IM: CPT | Performed by: OBSTETRICS & GYNECOLOGY

## 2020-09-11 ASSESSMENT — PATIENT HEALTH QUESTIONNAIRE - PHQ9
5. POOR APPETITE OR OVEREATING: NOT AT ALL
SUM OF ALL RESPONSES TO PHQ QUESTIONS 1-9: 5

## 2020-09-11 ASSESSMENT — ANXIETY QUESTIONNAIRES
IF YOU CHECKED OFF ANY PROBLEMS ON THIS QUESTIONNAIRE, HOW DIFFICULT HAVE THESE PROBLEMS MADE IT FOR YOU TO DO YOUR WORK, TAKE CARE OF THINGS AT HOME, OR GET ALONG WITH OTHER PEOPLE: NOT DIFFICULT AT ALL
GAD7 TOTAL SCORE: 0
6. BECOMING EASILY ANNOYED OR IRRITABLE: NOT AT ALL
7. FEELING AFRAID AS IF SOMETHING AWFUL MIGHT HAPPEN: NOT AT ALL
2. NOT BEING ABLE TO STOP OR CONTROL WORRYING: NOT AT ALL
3. WORRYING TOO MUCH ABOUT DIFFERENT THINGS: NOT AT ALL
5. BEING SO RESTLESS THAT IT IS HARD TO SIT STILL: NOT AT ALL
1. FEELING NERVOUS, ANXIOUS, OR ON EDGE: NOT AT ALL

## 2020-09-11 ASSESSMENT — MIFFLIN-ST. JEOR: SCORE: 1707.24

## 2020-09-11 NOTE — PATIENT INSTRUCTIONS
Follow up with your primary care provider for your other medical problems.  Continue self breast exam.  Increase physical activity and exercise.  Usual safety and preventative measures counseling done.  BMI >25  Weight loss encouraged.  Flu Shot today.  Last pap smear (2017) was normal and negative for the DNA of high risk HPV subtypes.  No pap was obtained this year.  This was discussed with the patient and she agrees with the plan.  Will have first colonoscopy results sent to our office in November.

## 2020-09-12 ASSESSMENT — ANXIETY QUESTIONNAIRES: GAD7 TOTAL SCORE: 0

## 2020-10-05 ENCOUNTER — OFFICE VISIT (OUTPATIENT)
Dept: FAMILY MEDICINE | Facility: CLINIC | Age: 50
End: 2020-10-05
Payer: COMMERCIAL

## 2020-10-05 VITALS
HEART RATE: 89 BPM | DIASTOLIC BLOOD PRESSURE: 78 MMHG | HEIGHT: 66 IN | BODY MASS INDEX: 37.77 KG/M2 | WEIGHT: 235 LBS | SYSTOLIC BLOOD PRESSURE: 118 MMHG | OXYGEN SATURATION: 98 % | TEMPERATURE: 98 F

## 2020-10-05 DIAGNOSIS — I10 HYPERTENSION GOAL BP (BLOOD PRESSURE) < 130/80: ICD-10-CM

## 2020-10-05 DIAGNOSIS — F41.9 ANXIETY: ICD-10-CM

## 2020-10-05 DIAGNOSIS — M19.041 ARTHRITIS OF RIGHT HAND: ICD-10-CM

## 2020-10-05 DIAGNOSIS — Z00.00 ROUTINE GENERAL MEDICAL EXAMINATION AT A HEALTH CARE FACILITY: Primary | ICD-10-CM

## 2020-10-05 DIAGNOSIS — G43.909 MIGRAINE WITHOUT STATUS MIGRAINOSUS, NOT INTRACTABLE, UNSPECIFIED MIGRAINE TYPE: ICD-10-CM

## 2020-10-05 DIAGNOSIS — Z13.220 SCREENING FOR HYPERLIPIDEMIA: ICD-10-CM

## 2020-10-05 DIAGNOSIS — E03.9 HYPOTHYROIDISM, UNSPECIFIED TYPE: ICD-10-CM

## 2020-10-05 LAB
ERYTHROCYTE [DISTWIDTH] IN BLOOD BY AUTOMATED COUNT: 12.9 % (ref 10–15)
HCT VFR BLD AUTO: 43.5 % (ref 35–47)
HGB BLD-MCNC: 14.6 G/DL (ref 11.7–15.7)
MCH RBC QN AUTO: 29.9 PG (ref 26.5–33)
MCHC RBC AUTO-ENTMCNC: 33.6 G/DL (ref 31.5–36.5)
MCV RBC AUTO: 89 FL (ref 78–100)
PLATELET # BLD AUTO: 214 10E9/L (ref 150–450)
RBC # BLD AUTO: 4.88 10E12/L (ref 3.8–5.2)
WBC # BLD AUTO: 7.1 10E9/L (ref 4–11)

## 2020-10-05 PROCEDURE — 85027 COMPLETE CBC AUTOMATED: CPT | Performed by: FAMILY MEDICINE

## 2020-10-05 PROCEDURE — 80061 LIPID PANEL: CPT | Performed by: FAMILY MEDICINE

## 2020-10-05 PROCEDURE — 82043 UR ALBUMIN QUANTITATIVE: CPT | Performed by: FAMILY MEDICINE

## 2020-10-05 PROCEDURE — 80053 COMPREHEN METABOLIC PANEL: CPT | Performed by: FAMILY MEDICINE

## 2020-10-05 PROCEDURE — 99396 PREV VISIT EST AGE 40-64: CPT | Performed by: FAMILY MEDICINE

## 2020-10-05 PROCEDURE — 84443 ASSAY THYROID STIM HORMONE: CPT | Performed by: FAMILY MEDICINE

## 2020-10-05 RX ORDER — MELOXICAM 15 MG/1
15 TABLET ORAL DAILY
Qty: 90 TABLET | Refills: 3 | Status: SHIPPED | OUTPATIENT
Start: 2020-10-05 | End: 2021-08-25

## 2020-10-05 RX ORDER — ESCITALOPRAM OXALATE 20 MG/1
20 TABLET ORAL DAILY
Qty: 90 TABLET | Refills: 1 | Status: SHIPPED | OUTPATIENT
Start: 2020-10-05 | End: 2021-08-24

## 2020-10-05 RX ORDER — NIFEDIPINE 60 MG/1
60 TABLET, EXTENDED RELEASE ORAL DAILY
Qty: 90 TABLET | Refills: 3 | Status: SHIPPED | OUTPATIENT
Start: 2020-10-05 | End: 2021-11-01

## 2020-10-05 RX ORDER — LISINOPRIL 10 MG/1
10 TABLET ORAL DAILY
Qty: 90 TABLET | Refills: 3 | Status: SHIPPED | OUTPATIENT
Start: 2020-10-05 | End: 2021-08-25

## 2020-10-05 RX ORDER — LEVOTHYROXINE SODIUM 137 UG/1
137 TABLET ORAL DAILY
Qty: 90 TABLET | Refills: 1 | Status: SHIPPED | OUTPATIENT
Start: 2020-10-05 | End: 2021-06-08

## 2020-10-05 ASSESSMENT — MIFFLIN-ST. JEOR: SCORE: 1702.7

## 2020-10-06 LAB
ALBUMIN SERPL-MCNC: 3.6 G/DL (ref 3.4–5)
ALP SERPL-CCNC: 86 U/L (ref 40–150)
ALT SERPL W P-5'-P-CCNC: 40 U/L (ref 0–50)
ANION GAP SERPL CALCULATED.3IONS-SCNC: 5 MMOL/L (ref 3–14)
AST SERPL W P-5'-P-CCNC: 20 U/L (ref 0–45)
BILIRUB SERPL-MCNC: 0.6 MG/DL (ref 0.2–1.3)
BUN SERPL-MCNC: 17 MG/DL (ref 7–30)
CALCIUM SERPL-MCNC: 9.8 MG/DL (ref 8.5–10.1)
CHLORIDE SERPL-SCNC: 107 MMOL/L (ref 94–109)
CHOLEST SERPL-MCNC: 191 MG/DL
CO2 SERPL-SCNC: 26 MMOL/L (ref 20–32)
CREAT SERPL-MCNC: 0.77 MG/DL (ref 0.52–1.04)
CREAT UR-MCNC: 152 MG/DL
GFR SERPL CREATININE-BSD FRML MDRD: 90 ML/MIN/{1.73_M2}
GLUCOSE SERPL-MCNC: 88 MG/DL (ref 70–99)
HDLC SERPL-MCNC: 34 MG/DL
LDLC SERPL CALC-MCNC: 117 MG/DL
MICROALBUMIN UR-MCNC: 9 MG/L
MICROALBUMIN/CREAT UR: 6.05 MG/G CR (ref 0–25)
NONHDLC SERPL-MCNC: 157 MG/DL
POTASSIUM SERPL-SCNC: 4.3 MMOL/L (ref 3.4–5.3)
PROT SERPL-MCNC: 7.2 G/DL (ref 6.8–8.8)
SODIUM SERPL-SCNC: 138 MMOL/L (ref 133–144)
TRIGL SERPL-MCNC: 201 MG/DL
TSH SERPL DL<=0.005 MIU/L-ACNC: 0.87 MU/L (ref 0.4–4)

## 2020-10-10 NOTE — RESULT ENCOUNTER NOTE
Dear Candie,    Here is a summary of your recent test results:  -Normal red blood cell (hgb) levels, normal white blood cell count and normal platelet levels.  -LDL(bad) cholesterol level is elevated, HDL(good) cholesterol level is low and your triglycerides are elevated which can increase your heart disease risk.  A diet high in fat and simple carbohydrates, genetics and being overweight can contribute to this. ADVISE: exercising 150 minutes of aerobic exercise per week (30 minutes for 5 days per week or 50 minutes for 3 days per week are options), eating a low saturated fat/low carbohydrate diet, and omega-3 fatty acids (fish oil) 5049-6721 mg daily are helpful to improve this. In 3 months, you should recheck your fasting cholesterol panel by scheduling a lab-only appointment. In 12 months, you should recheck your fasting cholesterol panel by scheduling a lab-only appointment.  -2.4% of patients that have a similar profile to you will have a stroke, heart attack or death (related to heart disease) within the next 10 years and that is considered a low risk and cholesterol lowering medications are not recommended at this time (high risk is >10%, or >7.5% if other risk factors such has high blood pressure or other heart disease risk factors).    The ASCVD risk score (Ginette STOREY Jr, et al., 2013) returns the percentage likelihood of a first time Atherosclerotic Cardiovascular Disease (ASCVD) event.    The 10-year ASCVD risk score (Ginette STOREY Jr., et al., 2013) is: 2.4%    Values used to calculate the score:      Age: 50 years      Sex: Female      Is Non- : No      Diabetic: No      Tobacco smoker: No      Systolic Blood Pressure: 118 mmHg      Is BP treated: Yes      HDL Cholesterol: 34 mg/dL      Total Cholesterol: 191 mg/dL      -Liver and gallbladder tests are normal (ALT,AST, Alk phos, bilirubin), kidney function is normal (Cr, GFR), sodium is normal, potassium is normal, calcium is normal, glucose  is normal.  -TSH (thyroid stimulating hormone) level is normal which indicates appropriate thyroid replacement dosing.  ADVISE: continuing same replacement dose and rechecking this in 12 months.  -Microalbumin (urine protein) test is normal.  ADVISE: rechecking this annually.    For additional lab test information, labtestsonline.org is an excellent reference.    In addition, here is a list of due or overdue Health Maintenance reminders:  Colorectal Cancer Screening due  Zoster (Shingles) Vaccine(1 of 2) due on 04/08/2020    Please call us at [phone] (or use [employer]) to address the above recommendations if needed.           Thank you very much for trusting me and [facility].     Have a peaceful day.    Healthy regards,  Sonny Conde MD

## 2020-11-06 ENCOUNTER — TRANSFERRED RECORDS (OUTPATIENT)
Dept: HEALTH INFORMATION MANAGEMENT | Facility: CLINIC | Age: 50
End: 2020-11-06

## 2020-12-07 ENCOUNTER — TELEPHONE (OUTPATIENT)
Dept: OBGYN | Facility: CLINIC | Age: 50
End: 2020-12-07

## 2020-12-07 DIAGNOSIS — N95.1 SYMPTOMATIC MENOPAUSAL OR FEMALE CLIMACTERIC STATES: Primary | ICD-10-CM

## 2020-12-07 NOTE — TELEPHONE ENCOUNTER
Called pt to discuss recommendations.    Pt would like to go ahead and start the medication.  Doesn't feel she has any questions at this time.      Instructed pt to schedule follow up in 4-6 weeks.    Requesting the estrogen daily pill.  [facility] in [place], [phone]    Pt verbalized understanding, in agreement with plan, and voiced no further questions.    Routing to provider for rx.    Cha Renee RN on 12/7/2020 at 4:20 PM

## 2020-12-07 NOTE — TELEPHONE ENCOUNTER
Okay, because she has had hysterectomy, we can simply do estrogen --can either do a pill or a patch.  Pill will be daily and patch is once or twice per week depending on the patch we use.  If she has lots of questions, she should set up a phone visit with me.  If she simply wants to start the meds, we can send something in and have her follow up with me in 4-6 weeks

## 2020-12-07 NOTE — TELEPHONE ENCOUNTER
"Pt called as she is having night sweats and she \"has had enough.\"    Last OV 9/11/2020 with Dr. Andersen.  She stated that treatments for menopause symptoms were discussed and that she could call for meds when she was ready.    Routing to provider for review/recommendation.    Cha Renee RN on 12/7/2020 at 1:15 PM          "

## 2020-12-08 RX ORDER — ESTRADIOL 0.5 MG/1
0.5 TABLET ORAL DAILY
Qty: 60 TABLET | Refills: 0 | Status: SHIPPED | OUTPATIENT
Start: 2020-12-08 | End: 2021-02-04

## 2020-12-08 NOTE — TELEPHONE ENCOUNTER
Okay.  Generally I'll start with a middle of the road dose so we have room to go up if needed and have some room to wean as well when the time comes.  I would recommend that we do estrace 0.5mg daily.  Most women do very well with this dose and will report improvement within the first 1-2 weeks.  If no improvement, please call and let me know and we can increase her dose.  If doing well, I like to do a follow up visit in 6-8 weeks (likely phone visit with current pandemic).  Can send #90d supply with enough refills to get through her next annual exam

## 2020-12-08 NOTE — TELEPHONE ENCOUNTER
Rx sent, pt notified of rx and recommendations via [employer] msg.    Cha Renee RN on 12/8/2020 at 9:48 AM

## 2020-12-14 ENCOUNTER — TRANSFERRED RECORDS (OUTPATIENT)
Dept: HEALTH INFORMATION MANAGEMENT | Facility: CLINIC | Age: 50
End: 2020-12-14

## 2020-12-23 ENCOUNTER — TRANSFERRED RECORDS (OUTPATIENT)
Dept: HEALTH INFORMATION MANAGEMENT | Facility: CLINIC | Age: 50
End: 2020-12-23

## 2020-12-28 ENCOUNTER — TRANSFERRED RECORDS (OUTPATIENT)
Dept: HEALTH INFORMATION MANAGEMENT | Facility: CLINIC | Age: 50
End: 2020-12-28

## 2020-12-31 ENCOUNTER — TRANSFERRED RECORDS (OUTPATIENT)
Dept: HEALTH INFORMATION MANAGEMENT | Facility: CLINIC | Age: 50
End: 2020-12-31

## 2021-02-03 DIAGNOSIS — N95.1 SYMPTOMATIC MENOPAUSAL OR FEMALE CLIMACTERIC STATES: ICD-10-CM

## 2021-02-03 PROBLEM — N83.202 CYST OF LEFT OVARY: Status: RESOLVED | Noted: 2020-06-30 | Resolved: 2021-02-03

## 2021-02-03 PROBLEM — E89.40 SURGICAL MENOPAUSE ON HORMONE REPLACEMENT THERAPY: Status: ACTIVE | Noted: 2020-12-01

## 2021-02-03 PROBLEM — N83.202 LEFT OVARIAN CYST: Status: RESOLVED | Noted: 2019-04-12 | Resolved: 2021-02-03

## 2021-02-03 PROBLEM — Z79.890 SURGICAL MENOPAUSE ON HORMONE REPLACEMENT THERAPY: Status: ACTIVE | Noted: 2020-12-01

## 2021-02-03 RX ORDER — ESTRADIOL 0.5 MG/1
0.5 TABLET ORAL DAILY
Qty: 60 TABLET | Refills: 0 | OUTPATIENT
Start: 2021-02-03

## 2021-02-03 NOTE — TELEPHONE ENCOUNTER
Requested Prescriptions   Pending Prescriptions Disp Refills     estradiol (ESTRACE) 0.5 MG tablet 60 tablet 0     Sig: Take 1 tablet (0.5 mg) by mouth daily       There is no refill protocol information for this order      Last Written Prescription Date:  12/8/20  Last Fill Quantity: 60,  # refills: 0   Last office visit: 9/11/2020 with prescribing provider:  Dr Andersen   Future Office Visit:   Next 5 appointments (look out 90 days)    Feb 04, 2021  1:30 PM  Office Visit with Christiana Andersen MD  [facility] ([facility]) [address] [id number] [phone]

## 2021-02-03 NOTE — TELEPHONE ENCOUNTER
Pt has appointment tomorrow and adequate rx to get to appointment.    Rx to be addressed at appt.  Rx denied.    Cha Renee RN on 2/3/2021 at 12:07 PM

## 2021-02-03 NOTE — PROGRESS NOTES
SUBJECTIVE:                                                   Christine Dubose is a 50 year old female who presents to clinic today for the following health issue(s):  Patient presents with:  Recheck Medication: f/u estrogen, hot flashes/night sweats better, skin dryness    HPI:  Here today to follow up initiation of HRT.  Candie had a laparascopic BSO with me in 2020 and called in December with intolerable night sweats.  Started estradiol 0.5mg in early December and noticed an improvement very shortly thereafter.  Still has occ night sweat here and there but nothing severe or consistent.  No daytime hot flushes.  No other side effects or issues.  Has noticed worsening of dry skin which she wonders if this is caused by hormones.  Discussed seasonal dry air and need for more moisturization.    Lastly, has noticed small breast lump on her left side over the last month.  Found during routine SBE.  No pain or discomfort.  Has been pea size and has not noticed and changes in size, texture or shape.  No overlying skin changes.  No discoloration or rash.  No nipple involvement or discharge.  Had normal mammogram with us in early September.  No personal or family hx breast dz.  Has been called back only one time for additional breast imaging and was told she simply has dense breasts.    Patient's last menstrual period was 2014..     Patient is sexually active, .  Using hysterectomy for contraception.    reports that she has never smoked. She has never used smokeless tobacco.    STD testing offered?  Declined    Health maintenance updated:  yes    Today's PHQ-2 Score:   PHQ-2 (  [employer]) 2020   Q1: Little interest or pleasure in doing things 1   Q2: Feeling down, depressed or hopeless 1   PHQ-2 Score 2   Q1: Little interest or pleasure in doing things Several days   Q2: Feeling down, depressed or hopeless Several days   PHQ-2 Score 2     Today's PHQ-9 Score:   PHQ-9 SCORE 2020   PHQ-9 Total  Score -   PHQ-9 Total Score 5     Today's JAYDE-7 Score:   JAYDE-7 SCORE 2020   Total Score -   Total Score 0       Problem list and histories reviewed & adjusted, as indicated.  Additional history: as documented.    Patient Active Problem List   Diagnosis     CARDIOVASCULAR SCREENING; LDL GOAL LESS THAN 160     Hypertension goal BP (blood pressure) < 130/80     Migraine     Osteoarthritis     Knee joint replacement by other means     Abnormal gait     Low back pain     Synovial cyst     History of arthroplasty of left knee     Posttraumatic stress disorder     Hypothyroidism, unspecified type     Arthritis of right hand     Arthralgia of right acromioclavicular joint     Morbid obesity (H)     Anxiety     Surgical menopause on hormone replacement therapy     Past Surgical History:   Procedure Laterality Date     ARTHROPLASTY KNEE UNICOMPARTMENT Right 2015    Procedure: ARTHROPLASTY KNEE UNICOMPARTMENT;  Surgeon: Pablo Lara MD;  Location:  OR     ARTHROPLASTY KNEE UNICOMPARTMENT Left 10/27/2015    Procedure: ARTHROPLASTY KNEE UNICOMPARTMENT;  Surgeon: Pablo Lara MD;  Location:  OR      SECTION  1997    for breech     CYCTOCELE REPAIR  2010    sling     CYSTOSCOPY  2014    Procedure: CYSTOSCOPY;;  Surgeon: Candie Crandall MD;  Location: [facility]     DECOMPRESSION LUMBAR ONE LEVEL Right 2015    Procedure: DECOMPRESSION LUMBAR ONE LEVEL;  Surgeon: Elio Starr MD;  Location:  OR     DILATION AND CURETTAGE, OPERATIVE HYSTEROSCOPY, COMBINED  2012    Procedure:COMBINED DILATION AND CURETTAGE, OPERATIVE HYSTEROSCOPY; OPERATIVE HYSTEROSCOPY, DILATION AND CURETTAGE, POSSIBLE RESECTION OF POLYP; Surgeon:CANDIE CRANDALL; Location:[facility]     LAPAROSCOPIC HYSTERECTOMY SUPRACERVICAL  2014    Procedure: LAPAROSCOPIC HYSTERECTOMY SUPRACERVICAL;  LAPAROSCOPIC ASSISTED SUPRACERVICAL HYSTERECTOMY, cystoscopy;  Surgeon: Candie Crandall  MD Karen;  Location:  [facility]     LAPAROSCOPIC OOPHORECTOMY Bilateral 07/23/2020    Procedure: LAPAROSCOPIC BILATERAL OOPHORECTOMY;  Surgeon: Christiana Andersen MD;  Location:  OR     STRIP VEIN  11/2010     TONSILLECTOMY  1974      Social History     Tobacco Use     Smoking status: Never Smoker     Smokeless tobacco: Never Used   Substance Use Topics     Alcohol use: Yes     Alcohol/week: 0.0 standard drinks     Comment: Socially      Problem (# of Occurrences) Relation (Name,Age of Onset)    Cerebrovascular Disease (1) Paternal Grandmother: TIA    Diabetes (4) Maternal Grandmother (Amy), Maternal Grandfather (Willis), Paternal Grandmother, Paternal Grandfather    Fractures (1) Maternal Grandmother (Amy): Hip    Hyperlipidemia (2) Mother (Raiza): high triglycerides, Father (Remy): not on meds    Hypertension (4) Mother (Raiza), Father (Remy), Maternal Grandmother (Amy), Paternal Grandmother    Hypothyroidism (1) Daughter    Lung Cancer (1) Maternal Grandfather (Willis, 68): smoker    No Known Problems (3) Son, Son, Son    Osteoporosis (1) Maternal Grandmother (Amy)    Stomach Cancer (1) Paternal Grandfather (68)    Thyroid Disease (1) Mother (Raiza)       Negative family history of: Coronary Artery Disease, Breast Cancer, Colon Cancer            Current Outpatient Medications   Medication Sig     Calcium-Magnesium-Zinc 333-133-5 MG TABS Take 1 tablet by mouth At Bedtime     escitalopram (LEXAPRO) 20 MG tablet Take 1 tablet (20 mg) by mouth daily     estradiol (ESTRACE) 0.5 MG tablet Take 1 tablet (0.5 mg) by mouth daily     IRON-FOLIC ACID PO      KRILL OIL PO Take 1 capsule by mouth every evening      levothyroxine (SYNTHROID/LEVOTHROID) 137 MCG tablet Take 1 tablet (137 mcg) by mouth daily     lisinopril (ZESTRIL) 10 MG tablet Take 1 tablet (10 mg) by mouth daily     meloxicam (MOBIC) 15 MG tablet Take 1 tablet (15 mg) by mouth daily     NIFEdipine ER OSMOTIC (PROCARDIA XL) 60 MG  24 hr tablet Take 1 tablet (60 mg) by mouth daily     olopatadine (PATANOL) 0.1 % ophthalmic solution PLACE 1 DROP INTO BOTH EYES TWICE DAILY     Pediatric Multivit-Minerals-C (MULTIVITAMIN GUMMIES CHILDRENS PO) Take 2 chew tab by mouth daily as needed     No current facility-administered medications for this visit.      Allergies   Allergen Reactions     Imitrex [Sumatriptan] Other (See Comments)     Throat tightness       ROS:  12 point review of systems negative other than symptoms noted below or in the HPI.  Genitourinary: Hot Flashes and Night Sweats  Skin: Skin Dryness  No urinary frequency or dysuria, bladder or kidney problems      OBJECTIVE:     /88   Wt 111.6 kg (246 lb)   LMP 03/17/2014   Breastfeeding No   BMI 39.71 kg/m    Body mass index is 39.71 kg/m .    Exam:  Constitutional:  Appearance: Well nourished, well developed alert, in no acute distress  Breasts:  Inspection of Breasts:  Symmetric bilaterally.  No puckering.  No skin changes.  Palpation of Breasts and Axillae:  No masses present on palpation, no breast tenderness Axillary Lymph Nodes:  No lymphadenopathy present; SMALL PEA SIZED (3-4MM) LUMP NOTED AT 10O'CLOCK ON LEFT BREAST; SMOOTH, ROUND, MOBILE, NONTENDER  Skin: General Inspection:  No rashes present, no lesions present, no areas of discoloration.  Neurologic:  Mental Status:  Oriented X3.  Normal strength and tone, sensory exam grossly normal, mentation intact and speech normal.    Psychiatric:  Mentation appears normal and affect normal/bright.     In-Clinic Test Results:  No results found for this or any previous visit (from the past 24 hour(s)).    ASSESSMENT/PLAN:                                                        ICD-10-CM    1. Surgical menopause on hormone replacement therapy  E89.40     Z79.890    2. Symptomatic menopausal or female climacteric states  N95.1 estradiol (ESTRACE) 0.5 MG tablet   3. Left breast lump  N63.20        Patient Instructions   Will continue  on current dose of hormone replacement therapy and re-evaluate at yearly exam in September.  Discussed goal to use the lowest effective dose for the shortest time possible to minimize long term risks of HRT.  Renewal for medication sent  Will continue to monitor left breast mass (pea size).  If persistent over the next month, will evaluate with breast ultrasound.  Patient will call in with follow up in early March.      Christiana Andersen MD  [facility] [place]

## 2021-02-04 ENCOUNTER — OFFICE VISIT (OUTPATIENT)
Dept: OBGYN | Facility: CLINIC | Age: 51
End: 2021-02-04
Payer: COMMERCIAL

## 2021-02-04 VITALS — DIASTOLIC BLOOD PRESSURE: 88 MMHG | BODY MASS INDEX: 39.71 KG/M2 | SYSTOLIC BLOOD PRESSURE: 128 MMHG | WEIGHT: 246 LBS

## 2021-02-04 DIAGNOSIS — E89.40 SURGICAL MENOPAUSE ON HORMONE REPLACEMENT THERAPY: Primary | ICD-10-CM

## 2021-02-04 DIAGNOSIS — Z79.890 SURGICAL MENOPAUSE ON HORMONE REPLACEMENT THERAPY: Primary | ICD-10-CM

## 2021-02-04 DIAGNOSIS — N95.1 SYMPTOMATIC MENOPAUSAL OR FEMALE CLIMACTERIC STATES: ICD-10-CM

## 2021-02-04 DIAGNOSIS — N63.20 LEFT BREAST LUMP: ICD-10-CM

## 2021-02-04 PROCEDURE — 99213 OFFICE O/P EST LOW 20 MIN: CPT | Performed by: OBSTETRICS & GYNECOLOGY

## 2021-02-04 RX ORDER — ESTRADIOL 0.5 MG/1
0.5 TABLET ORAL DAILY
Qty: 90 TABLET | Refills: 2 | Status: SHIPPED | OUTPATIENT
Start: 2021-02-04 | End: 2021-10-11

## 2021-02-04 NOTE — PATIENT INSTRUCTIONS
Will continue on current dose of hormone replacement therapy and re-evaluate at yearly exam in September.  Discussed goal to use the lowest effective dose for the shortest time possible to minimize long term risks of HRT.  Renewal for medication sent  Will continue to monitor left breast mass (pea size).  If persistent over the next month, will evaluate with breast ultrasound.  Patient will call in with follow up in early March.

## 2021-05-31 VITALS — WEIGHT: 200 LBS | BODY MASS INDEX: 32.14 KG/M2 | HEIGHT: 66 IN

## 2021-05-31 VITALS — WEIGHT: 200 LBS | HEIGHT: 66 IN | BODY MASS INDEX: 32.14 KG/M2

## 2021-06-03 ENCOUNTER — RECORDS - HEALTHEAST (OUTPATIENT)
Dept: ADMINISTRATIVE | Facility: CLINIC | Age: 51
End: 2021-06-03

## 2021-06-04 ENCOUNTER — RECORDS - HEALTHEAST (OUTPATIENT)
Dept: ADMINISTRATIVE | Facility: CLINIC | Age: 51
End: 2021-06-04

## 2021-06-05 ENCOUNTER — RECORDS - HEALTHEAST (OUTPATIENT)
Dept: PALLIATIVE MEDICINE | Facility: OTHER | Age: 51
End: 2021-06-05

## 2021-06-05 ENCOUNTER — RECORDS - HEALTHEAST (OUTPATIENT)
Dept: ADMINISTRATIVE | Facility: CLINIC | Age: 51
End: 2021-06-05

## 2021-06-05 DIAGNOSIS — M47.12 CERVICAL SPONDYLOSIS WITH MYELOPATHY: ICD-10-CM

## 2021-06-05 DIAGNOSIS — M53.80: ICD-10-CM

## 2021-06-13 NOTE — PROGRESS NOTES
Injection - Other  Date/Time: 10/9/2017 10:10 AM  Performed by: JIMMY ALBA  Authorized by: JIMMY ALBA   Comments:     TRIGGER POINT INJECTION  Performed on: 10/9/2017    Ms. Dubose is a 47-year-old woman with left-sided neck pain.  She was coming in today to have cervical facet injections done, however, she just had a flu shot a couple days ago.  She was however receptive to having trigger point injections done without steroid and returning in 2 weeks to have the facet injections done.    Pre Procedure Diagnosis:  Myofascial pain  Vital Signs:  As per intra-procedure documentation    The procedure was discussed with Christine Dubose in detail along with the attendant risks, including but not limited to: nerve injury, infection, bleeding, allergic reaction, or worsening of pain.  Informed consent was obtained and patient elected to proceed.    After informed consent was obtained the patient was seated in the examination chair.  The left upper cervical area was prepped sterilely.  A 1 1/4 inch 27-gauge needle was used.  There were injections made in the left upper cervical paraspinal muscle near the attachment to the occiput as well as in the splenius capitis muscle and middle cervical paraspinal muscle on the left.  A total of 6 mL of 0.25% Marcaine was used in divided doses.    The patient tolerated the procedure well.  The patient was taken to the recovery area after the injection.  There were no complications.      Pre Procedure Pain Scale: 4  Pain Score:   2 (post procedure vitals and pain. tiger point injection to lft side of neck)    If Christine Dubose has any questions or concerns after discharge, she was instructed to call us.

## 2021-07-03 NOTE — ADDENDUM NOTE
Addendum Note by Sharon Poon RN at 7/20/2017  3:57 PM     Author: Sharon Poon RN Service: -- Author Type: Registered Nurse    Filed: 7/20/2017  3:57 PM Encounter Date: 7/20/2017 Status: Signed    : Sharon Poon RN (Registered Nurse)    Addended by: SHARON POON on: 7/20/2017 03:57 PM        Modules accepted: Orders

## 2021-09-12 ENCOUNTER — HEALTH MAINTENANCE LETTER (OUTPATIENT)
Age: 51
End: 2021-09-12

## 2021-10-09 DIAGNOSIS — N95.1 SYMPTOMATIC MENOPAUSAL OR FEMALE CLIMACTERIC STATES: ICD-10-CM

## 2021-10-11 RX ORDER — ESTRADIOL 0.5 MG/1
TABLET ORAL
Qty: 30 TABLET | Refills: 0 | Status: SHIPPED | OUTPATIENT
Start: 2021-10-11 | End: 2021-10-11

## 2021-10-11 RX ORDER — ESTRADIOL 0.5 MG/1
TABLET ORAL
Qty: 90 TABLET | Refills: 0 | Status: SHIPPED | OUTPATIENT
Start: 2021-10-11 | End: 2022-01-04

## 2021-10-11 NOTE — TELEPHONE ENCOUNTER
"Requested Prescriptions   Pending Prescriptions Disp Refills     estradiol (ESTRACE) 0.5 MG tablet [Pharmacy Med Name: ESTRADIOL 0.5MG TABLETS] 90 tablet 2     Sig: TAKE 1 TABLET(0.5 MG) BY MOUTH DAILY       Hormone Replacement Therapy Passed - 10/9/2021  4:17 AM        Passed - Blood pressure under 140/90 in past 12 months     BP Readings from Last 3 Encounters:   02/04/21 128/88   10/05/20 118/78   09/11/20 124/86                 Passed - Recent (12 mo) or future (30 days) visit within the authorizing provider's specialty     Patient has had an office visit with the authorizing provider or a provider within the authorizing providers department within the previous 12 mos or has a future within next 30 days. See \"Patient Info\" tab in inbasket, or \"Choose Columns\" in Meds & Orders section of the refill encounter.              Passed - Patient has mammogram in past 2 years on file if age 50-75        Passed - Medication is active on med list        Passed - Patient is 18 years of age or older        Passed - No active pregnancy on record        Passed - No positive pregnancy test on record in past 12 months           Last Written Prescription Date:  2/4/21  Last Fill Quantity: 90,  # refills: 2   Last office visit: 2/4/2021 with prescribing provider:  Dr Andersen   Future Office Visit:   Next 5 appointments (look out 90 days)    Nov 01, 2021  1:40 PM  Zahida Hummel with Prudencio Conde MD  [facility] ([facility] ) [address] [place] [id number] [phone]   Jan 04, 2022  1:30 PM  Screening Mammogram with WEMA1  [facility] [place] ([facility] ) [address] [place] [address], [address]  [facility] [id number] [phone]         Pt was instructed to re-evaluate/med check in September 2021. 1 month extension and message sent to scheduling to have pt schedule for further refills    Viktoriya Del Rosario, " RN on 10/11/2021 at 11:48 AM

## 2021-11-01 ENCOUNTER — OFFICE VISIT (OUTPATIENT)
Dept: FAMILY MEDICINE | Facility: CLINIC | Age: 51
End: 2021-11-01
Payer: COMMERCIAL

## 2021-11-01 VITALS
SYSTOLIC BLOOD PRESSURE: 110 MMHG | WEIGHT: 202 LBS | HEIGHT: 66 IN | OXYGEN SATURATION: 98 % | TEMPERATURE: 97 F | BODY MASS INDEX: 32.47 KG/M2 | HEART RATE: 78 BPM | DIASTOLIC BLOOD PRESSURE: 60 MMHG

## 2021-11-01 DIAGNOSIS — I10 HYPERTENSION GOAL BP (BLOOD PRESSURE) < 130/80: ICD-10-CM

## 2021-11-01 DIAGNOSIS — G43.909 MIGRAINE WITHOUT STATUS MIGRAINOSUS, NOT INTRACTABLE, UNSPECIFIED MIGRAINE TYPE: ICD-10-CM

## 2021-11-01 DIAGNOSIS — E78.5 HYPERLIPIDEMIA LDL GOAL <100: ICD-10-CM

## 2021-11-01 DIAGNOSIS — E03.9 HYPOTHYROIDISM, UNSPECIFIED TYPE: ICD-10-CM

## 2021-11-01 DIAGNOSIS — F41.9 ANXIETY: ICD-10-CM

## 2021-11-01 DIAGNOSIS — M19.041 ARTHRITIS OF RIGHT HAND: Primary | ICD-10-CM

## 2021-11-01 PROBLEM — E66.01 MORBID OBESITY (H): Status: RESOLVED | Noted: 2018-04-30 | Resolved: 2021-11-01

## 2021-11-01 PROCEDURE — 99213 OFFICE O/P EST LOW 20 MIN: CPT | Performed by: FAMILY MEDICINE

## 2021-11-01 RX ORDER — ESCITALOPRAM OXALATE 20 MG/1
20 TABLET ORAL DAILY
Qty: 90 TABLET | Refills: 1 | Status: SHIPPED | OUTPATIENT
Start: 2021-11-01 | End: 2022-03-31

## 2021-11-01 RX ORDER — MELOXICAM 15 MG/1
15 TABLET ORAL DAILY
Qty: 90 TABLET | Refills: 1 | Status: SHIPPED | OUTPATIENT
Start: 2021-11-01 | End: 2022-12-08

## 2021-11-01 RX ORDER — NIFEDIPINE 60 MG/1
60 TABLET, EXTENDED RELEASE ORAL DAILY
Qty: 90 TABLET | Refills: 3 | Status: CANCELLED | OUTPATIENT
Start: 2021-11-01

## 2021-11-01 RX ORDER — LISINOPRIL 10 MG/1
10 TABLET ORAL DAILY
Qty: 90 TABLET | Refills: 3 | Status: SHIPPED | OUTPATIENT
Start: 2021-11-01 | End: 2022-12-13

## 2021-11-01 RX ORDER — LEVOTHYROXINE SODIUM 137 UG/1
137 TABLET ORAL DAILY
Qty: 90 TABLET | Refills: 3 | Status: SHIPPED | OUTPATIENT
Start: 2021-11-01 | End: 2022-12-07

## 2021-11-01 ASSESSMENT — ANXIETY QUESTIONNAIRES
2. NOT BEING ABLE TO STOP OR CONTROL WORRYING: NOT AT ALL
6. BECOMING EASILY ANNOYED OR IRRITABLE: SEVERAL DAYS
IF YOU CHECKED OFF ANY PROBLEMS ON THIS QUESTIONNAIRE, HOW DIFFICULT HAVE THESE PROBLEMS MADE IT FOR YOU TO DO YOUR WORK, TAKE CARE OF THINGS AT HOME, OR GET ALONG WITH OTHER PEOPLE: NOT DIFFICULT AT ALL
GAD7 TOTAL SCORE: 2
3. WORRYING TOO MUCH ABOUT DIFFERENT THINGS: SEVERAL DAYS
7. FEELING AFRAID AS IF SOMETHING AWFUL MIGHT HAPPEN: NOT AT ALL
5. BEING SO RESTLESS THAT IT IS HARD TO SIT STILL: NOT AT ALL
1. FEELING NERVOUS, ANXIOUS, OR ON EDGE: NOT AT ALL

## 2021-11-01 ASSESSMENT — MIFFLIN-ST. JEOR: SCORE: 1548.02

## 2021-11-01 ASSESSMENT — PATIENT HEALTH QUESTIONNAIRE - PHQ9
5. POOR APPETITE OR OVEREATING: NOT AT ALL
SUM OF ALL RESPONSES TO PHQ QUESTIONS 1-9: 3

## 2021-11-01 NOTE — PROGRESS NOTES
SUBJECTIVE:   CC: Christine Dubose is an 51 year old woman who presents for preventive health visit.     {Split Bill scripting  The purpose of this visit is to discuss your medical history and prevent health problems before you are sick. You may be responsible for a co-pay, coinsurance, or deductible if your visit today includes services such as checking on a sore throat, having an x-ray or lab test, or treating and evaluating a new or existing condition :555974}  Patient has been advised of split billing requirements and indicates understanding: {Yes and No:107317}  HPI  {Add if <65 person on Medicare  - Required Questions (Optional):703099}  {Outside tests to abstract? :307667}    {additional problems to add (Optional):624755}    Today's PHQ-2 Score:   PHQ-2 ( 1999 [employer]) 9/8/2020   Q1: Little interest or pleasure in doing things 1   Q2: Feeling down, depressed or hopeless 1   PHQ-2 Score 2   Q1: Little interest or pleasure in doing things Several days   Q2: Feeling down, depressed or hopeless Several days   PHQ-2 Score 2       Abuse: Current or Past (Physical, Sexual or Emotional) - { :034127}  Do you feel safe in your environment? { :971578}    Have you ever done Advance Care Planning? (For example, a Health Directive, POLST, or a discussion with a medical provider or your loved ones about your wishes): { :167536}    Social History     Tobacco Use     Smoking status: Never Smoker     Smokeless tobacco: Never Used   Substance Use Topics     Alcohol use: Yes     Alcohol/week: 0.0 standard drinks     Comment: Socially     {Rooming Staff- Complete this question if Prescreen response is not shown below for today's visit. If you drink alcohol do you typically have >3 drinks per day or >7 drinks per week? (Optional):238821}    Alcohol Use 2/27/2015   Prescreen: >3 drinks/day or >7 drinks/week? The patient does not drink >3 drinks per day nor >7 drinks per week.   {add AUDIT responses (Optional) (A score of 7 for  "adult men is an indication of hazardous drinking; a score of 8 or more is an indication of an alcohol use disorder.  A score of 7 or more for adult women is an indication of hazardous drinking or an alchohol use disorder):157288}    Reviewed orders with patient.  Reviewed health maintenance and updated orders accordingly - { :056446::\"Yes\"}  {Chronicprobdata (optional):966508}    Breast Cancer Screening:  Any new diagnosis of family breast, ovarian, or bowel cancer? {Yes_Link to Screening / No:283237}    FHS-7: No flowsheet data found.  {If any of the questions to the BCRA (FHS-7) are answered yes, consider ordering referral for genetic counseling (Optional) :890216::\"click delete button to remove this line now\"}  {AMB Mammogram Decision Support (Optional) :449169}  Pertinent mammograms are reviewed under the imaging tab.    History of abnormal Pap smear: { :517237}  PAP / HPV Latest Ref Rng & Units 6/23/2017   PAP (Historical) - NIL   HPV16 NEG Negative   HPV18 NEG Negative   HRHPV NEG Negative     Reviewed and updated as needed this visit by clinical staff                 Reviewed and updated as needed this visit by Provider                {HISTORY OPTIONS (Optional):768781}    Review of Systems  {FEMALE ROS (Optional):298437}     OBJECTIVE:   LMP 03/17/2014   Physical Exam  {Exam Choices (Optional):884577}    {Diagnostic Test Results (Optional):131289::\"Diagnostic Test Results:\",\"Labs reviewed in Epic\"}    ASSESSMENT/PLAN:   {Diag Picklist:135701}    Patient has been advised of split billing requirements and indicates understanding: {YES / NO:449278::\"Yes\"}  COUNSELING:  {FEMALE COUNSELING MESSAGES:933378::\"Reviewed preventive health counseling, as reflected in patient instructions\"}    Estimated body mass index is 39.71 kg/m  as calculated from the following:    Height as of 10/5/20: 1.676 m (5' 6\").    Weight as of 2/4/21: 111.6 kg (246 lb).    {Weight Management Plan (ACO) Complete if BMI is abnormal-  Ages " 18-64  BMI >24.9.  Age 65+ with BMI <23 or >30 (Optional):801676}    She reports that she has never smoked. She has never used smokeless tobacco.      Counseling Resources:  ATP IV Guidelines  Pooled Cohorts Equation Calculator  Breast Cancer Risk Calculator  BRCA-Related Cancer Risk Assessment: FHS-7 Tool  FRAX Risk Assessment  ICSI Preventive Guidelines  Dietary Guidelines for Americans, 2010  USDA's MyPlate  ASA Prophylaxis  Lung CA Screening    Prudencio Conde MD  [facility]

## 2021-11-01 NOTE — PROGRESS NOTES
SUBJECTIVE:   CC: Christine Dubose is an 51 year old male who presents for medication check       Patient has been advised of split billing requirements and indicates understanding: Yes    HPI    Hypertension Follow-up      Do you check your blood pressure regularly outside of the clinic? Yes     Are you following a low salt diet? Yes    Are your blood pressures ever more than 140 on the top number (systolic) OR more   than 90 on the bottom number (diastolic), for example 140/90? No    Anxiety Follow-Up    How are you doing with your anxiety since your last visit? Improved     Are you having other symptoms that might be associated with anxiety? No    Have you had a significant life event? No     Are you feeling depressed? No    Do you have any concerns with your use of alcohol or other drugs? No    Social History     Tobacco Use     Smoking status: Never Smoker     Smokeless tobacco: Never Used   Substance Use Topics     Alcohol use: Yes     Alcohol/week: 0.0 standard drinks     Comment: Socially     Drug use: No     JAYDE-7 SCORE 7/13/2018 7/23/2019 9/11/2020   Total Score - - -   Total Score 0 0 0     PHQ 7/13/2018 7/23/2019 9/11/2020   PHQ-9 Total Score 5 1 5   Q9: Thoughts of better off dead/self-harm past 2 weeks Not at all Not at all Not at all     Last PHQ-9 9/11/2020   1.  Little interest or pleasure in doing things 1   2.  Feeling down, depressed, or hopeless 1   3.  Trouble falling or staying asleep, or sleeping too much 1   4.  Feeling tired or having little energy 1   5.  Poor appetite or overeating 1   6.  Feeling bad about yourself 0   7.  Trouble concentrating 0   8.  Moving slowly or restless 0   Q9: Thoughts of better off dead/self-harm past 2 weeks 0   PHQ-9 Total Score 5   Difficulty at work, home, or with people Not difficult at all     JAYDE-7  9/11/2020   1. Feeling nervous, anxious, or on edge 0   2. Not being able to stop or control worrying 0   3. Worrying too much about different things 0   4.  Trouble relaxing 0   5. Being so restless that it is hard to sit still 0   6. Becoming easily annoyed or irritable 0   7. Feeling afraid, as if something awful might happen 0   JAYDE-7 Total Score 0   If you checked any problems, how difficult have they made it for you to do your work, take care of things at home, or get along with other people? Not difficult at all       Hypothyroidism Follow-up      Since last visit, patient describes the following symptoms: Weight stable, no hair loss, no skin changes, no constipation, no loose stools      How many servings of fruits and vegetables do you eat daily?  4 or more    On average, how many sweetened beverages do you drink each day (Examples: soda, juice, sweet tea, etc.  Do NOT count diet or artificially sweetened beverages)?   0    How many days per week do you exercise enough to make your heart beat faster? 3 or less    How many minutes a day do you exercise enough to make your heart beat faster? 9 or less    How many days per week do you miss taking your medication? 0      Healthy Habits:    Have you had an eye exam in the past two years? yes    Do you see a dentist twice per year? yes    Do you have sleep apnea, excessive snoring or daytime drowsiness?no      Today's PHQ-2 Score:   PHQ-2 ( 1999 [employer]) 11/1/2021 9/8/2020   Q1: Little interest or pleasure in doing things 1 1   Q2: Feeling down, depressed or hopeless 0 1   PHQ-2 Score 1 2   Q1: Little interest or pleasure in doing things - Several days   Q2: Feeling down, depressed or hopeless - Several days   PHQ-2 Score - 2       Abuse: Current or Past(Physical, Sexual or Emotional)- No  Do you feel safe in your environment? Yes    Have you ever done Advance Care Planning? (For example, a Health Directive, POLST, or a discussion with a medical provider or your loved ones about your wishes): No, advance care planning information given to patient to review.  Patient declined advance care planning discussion at this  "time.    Social History     Tobacco Use     Smoking status: Never Smoker     Smokeless tobacco: Never Used   Substance Use Topics     Alcohol use: Yes     Alcohol/week: 0.0 standard drinks     Comment: Socially     If you drink alcohol do you typically have >3 drinks per day or >7 drinks per week? No    Reviewed orders with patient. Reviewed health maintenance and updated orders accordingly - Yes    Mammogram Screening: Recommended annual mammography    Pertinent mammograms are reviewed under the imaging tab.  History of abnormal Pap smear: NO - age 30-65 PAP every 5 years with negative HPV co-testing recommended  PAP / HPV Latest Ref Rng & Units 6/23/2017   PAP (Historical) - NIL   HPV16 NEG Negative   HPV18 NEG Negative   HRHPV NEG Negative     Reviewed and updated as needed this visit by clinical staff  Tobacco  Allergies  Meds  Problems  Med Hx  Surg Hx  Fam Hx          Reviewed and updated as needed this visit by Provider     Problems                ROS:  Review of Systems  Constitutional, HEENT, cardiovascular, pulmonary, GI, , musculoskeletal, neuro, skin, endocrine and psych systems are negative, except as otherwise noted.     OBJECTIVE:   /60   Pulse 78   Temp 97  F (36.1  C) (Tympanic)   Ht 1.676 m (5' 6\")   Wt 91.6 kg (202 lb)   LMP 03/17/2014   SpO2 98%   Breastfeeding No   BMI 32.60 kg/m    EXAM:  GENERAL: healthy, alert and no distress  EYES: Eyes grossly normal to inspection, PERRL and conjunctivae and sclerae normal  HENT: ear canals and TM's normal, nose and mouth without ulcers or lesions  NECK: no adenopathy, no asymmetry, masses, or scars and thyroid normal to palpation  RESP: lungs clear to auscultation - no rales, rhonchi or wheezes  CV: regular rate and rhythm, normal S1 S2, no S3 or S4, no murmur, click or rub, no peripheral edema and peripheral pulses strong  ABDOMEN: soft, nontender, no hepatosplenomegaly, no masses and bowel sounds normal  MS: no gross " "musculoskeletal defects noted, no edema  SKIN: no suspicious lesions or rashes  NEURO: Normal strength and tone, mentation intact and speech normal  PSYCH: mentation appears normal, affect normal/bright  LYMPH: no cervical, supraclavicular, axillary, or inguinal adenopathy      ASSESSMENT/PLAN:   Arthritis of right hand  Ongoing symptoms are medication helpful and will refill:  - meloxicam (MOBIC) 15 MG tablet  Dispense: 90 tablet; Refill: 1    Hypertension goal BP (blood pressure) < 130/80  Controlled - continue medication(s).  - lisinopril (ZESTRIL) 10 MG tablet  Dispense: 90 tablet; Refill: 3    Anxiety  Stable - continue medication(s).  - escitalopram (LEXAPRO) 20 MG tablet  Dispense: 90 tablet; Refill: 1    Hypothyroidism, unspecified type  Controlled - continue medication(s).  - levothyroxine (SYNTHROID/LEVOTHROID) 137 MCG tablet  Dispense: 90 tablet; Refill: 3    Migraine without status migrainosus, not intractable, unspecified migraine type  Doing okay lately    Hyperlipidemia LDL goal <100  Stable and will monitor after new year when she has insurance         COUNSELING:   Reviewed preventive health counseling, as reflected in patient instructions    Estimated body mass index is 32.6 kg/m  as calculated from the following:    Height as of this encounter: 1.676 m (5' 6\").    Weight as of this encounter: 91.6 kg (202 lb).  Weight management plan: Discussed healthy diet and exercise guidelines     reports that she has never smoked. She has never used smokeless tobacco.      No follow-ups on file.         Sonny Conde MD     [address] [address]  [email or web address]     Office: [phone]     "

## 2021-11-01 NOTE — PROGRESS NOTES
"  Assessment & Plan   Routine general medical examination at a health care facility  ***      BMI:   Estimated body mass index is 32.6 kg/m  as calculated from the following:    Height as of this encounter: 1.676 m (5' 6\").    Weight as of this encounter: 91.6 kg (202 lb).   {Weight Management Plan needed for ACO:547387}      No follow-ups on file.      Sonny Conde MD      [address] [address]  [employer]   Office: [phone]       Shanae Schreiber is a 51 year old who presents for the following health issues     HPI     Hypertension Follow-up      Do you check your blood pressure regularly outside of the clinic? Yes     Are you following a low salt diet? Yes    Are your blood pressures ever more than 140 on the top number (systolic) OR more   than 90 on the bottom number (diastolic), for example 140/90? No    Anxiety Follow-Up    How are you doing with your anxiety since your last visit? Improved     Are you having other symptoms that might be associated with anxiety? No    Have you had a significant life event? No     Are you feeling depressed? No    Do you have any concerns with your use of alcohol or other drugs? No    Social History     Tobacco Use     Smoking status: Never Smoker     Smokeless tobacco: Never Used   Substance Use Topics     Alcohol use: Yes     Alcohol/week: 0.0 standard drinks     Comment: Socially     Drug use: No     JAYDE-7 SCORE 7/13/2018 7/23/2019 9/11/2020   Total Score - - -   Total Score 0 0 0     PHQ 7/13/2018 7/23/2019 9/11/2020   PHQ-9 Total Score 5 1 5   Q9: Thoughts of better off dead/self-harm past 2 weeks Not at all Not at all Not at all     Last PHQ-9 9/11/2020   1.  Little interest or pleasure in doing things 1   2.  Feeling down, depressed, or hopeless 1   3.  Trouble falling or staying asleep, or sleeping too much 1   4.  Feeling tired or having little energy 1   5.  Poor appetite or overeating 1   6.  Feeling " "bad about yourself 0   7.  Trouble concentrating 0   8.  Moving slowly or restless 0   Q9: Thoughts of better off dead/self-harm past 2 weeks 0   PHQ-9 Total Score 5   Difficulty at work, home, or with people Not difficult at all     JAYDE-7  9/11/2020   1. Feeling nervous, anxious, or on edge 0   2. Not being able to stop or control worrying 0   3. Worrying too much about different things 0   4. Trouble relaxing 0   5. Being so restless that it is hard to sit still 0   6. Becoming easily annoyed or irritable 0   7. Feeling afraid, as if something awful might happen 0   JAYDE-7 Total Score 0   If you checked any problems, how difficult have they made it for you to do your work, take care of things at home, or get along with other people? Not difficult at all       Hypothyroidism Follow-up      Since last visit, patient describes the following symptoms: Weight stable, no hair loss, no skin changes, no constipation, no loose stools      How many servings of fruits and vegetables do you eat daily?  4 or more    On average, how many sweetened beverages do you drink each day (Examples: soda, juice, sweet tea, etc.  Do NOT count diet or artificially sweetened beverages)?   0    How many days per week do you exercise enough to make your heart beat faster? 3 or less    How many minutes a day do you exercise enough to make your heart beat faster? 9 or less    How many days per week do you miss taking your medication? 0      Review of Systems   Constitutional, HEENT, cardiovascular, pulmonary, gi and gu systems are negative, except as otherwise noted.      Objective    /60   Pulse 78   Temp 97  F (36.1  C) (Tympanic)   Ht 1.676 m (5' 6\")   Wt 91.6 kg (202 lb)   LMP 03/17/2014   SpO2 98%   Breastfeeding No   BMI 32.60 kg/m    Body mass index is 32.6 kg/m .  Physical Exam   GENERAL: healthy, alert and no distress  NECK: no adenopathy, no asymmetry, masses, or scars and thyroid normal to palpation  RESP: lungs clear to " auscultation - no rales, rhonchi or wheezes  CV: regular rate and rhythm, normal S1 S2, no S3 or S4, no murmur, click or rub, no peripheral edema and peripheral pulses strong  ABDOMEN: soft, nontender, no hepatosplenomegaly, no masses and bowel sounds normal  MS: no gross musculoskeletal defects noted, no edema  SKIN: no suspicious lesions or rashes  NEURO: Normal strength and tone, mentation intact and speech normal  BACK: no CVA tenderness, no paralumbar tenderness  PSYCH: mentation appears normal, affect normal/bright    {Diagnostic Test Results (Optional):810837}

## 2021-11-02 ASSESSMENT — ANXIETY QUESTIONNAIRES: GAD7 TOTAL SCORE: 2

## 2021-11-07 ENCOUNTER — HEALTH MAINTENANCE LETTER (OUTPATIENT)
Age: 51
End: 2021-11-07

## 2021-11-18 DIAGNOSIS — G43.909 MIGRAINE WITHOUT STATUS MIGRAINOSUS, NOT INTRACTABLE, UNSPECIFIED MIGRAINE TYPE: ICD-10-CM

## 2021-11-19 RX ORDER — NIFEDIPINE 60 MG/1
TABLET, EXTENDED RELEASE ORAL
Qty: 90 TABLET | Refills: 3 | Status: SHIPPED | OUTPATIENT
Start: 2021-11-19 | End: 2021-11-19

## 2021-11-19 NOTE — TELEPHONE ENCOUNTER
Failed protocol. Med note on script says provider discontinued medication med 11/1/21 visit.  I do not see a note stating this.  please route to  team if patient needs an appointment     Bella CARBAJALRN BSN  [facility]  [phone]

## 2021-11-19 NOTE — TELEPHONE ENCOUNTER
She had significant weight loss and the medication was stopped due to lower blood pressures.  Will cancel at pharmacy.  She thinks it was on auto refill.    Last visit in this dept:    11/1/2021     Next visit in this dept:   Next 5 appointments (look out 90 days)    Jan 04, 2022  1:30 PM  Screening Mammogram with WEMA1  [facility] [place] ([facility] ) [address], [address]  [facility] [id number] [phone]          Health Maintenance   Topic Date Due     COVID-19 Vaccine (1) Never done     ZOSTER IMMUNIZATION (1 of 2) Never done     INFLUENZA VACCINE (1) 09/01/2021     MAMMO SCREENING  09/09/2021     PREVENTIVE CARE VISIT  10/05/2021     CMP  10/05/2021     LIPID  10/05/2021     MICROALBUMIN  10/05/2021     TSH W/FREE T4 REFLEX  10/05/2021     DTAP/TDAP/TD IMMUNIZATION (3 - Td or Tdap) 12/21/2021     HPV TEST  06/23/2022     PAP  06/23/2022     ANNUAL REVIEW OF HM ORDERS  11/01/2022     ADVANCE CARE PLANNING  11/01/2026     COLORECTAL CANCER SCREENING  11/06/2030     PHQ-2  Completed     HIV SCREENING  Addressed     Pneumococcal Vaccine: Pediatrics (0 to 5 Years) and At-Risk Patients (6 to 64 Years)  Aged Out     IPV IMMUNIZATION  Aged Out     MENINGITIS IMMUNIZATION  Aged Out     HEPATITIS B IMMUNIZATION  Aged Out     HEPATITIS C SCREENING  Discontinued

## 2022-01-02 ENCOUNTER — HEALTH MAINTENANCE LETTER (OUTPATIENT)
Age: 52
End: 2022-01-02

## 2022-01-04 ENCOUNTER — ANCILLARY PROCEDURE (OUTPATIENT)
Dept: MAMMOGRAPHY | Facility: CLINIC | Age: 52
End: 2022-01-04
Attending: FAMILY MEDICINE
Payer: COMMERCIAL

## 2022-01-04 ENCOUNTER — OFFICE VISIT (OUTPATIENT)
Dept: OBGYN | Facility: CLINIC | Age: 52
End: 2022-01-04
Payer: COMMERCIAL

## 2022-01-04 VITALS
DIASTOLIC BLOOD PRESSURE: 72 MMHG | WEIGHT: 202.2 LBS | SYSTOLIC BLOOD PRESSURE: 116 MMHG | BODY MASS INDEX: 32.5 KG/M2 | HEART RATE: 80 BPM | HEIGHT: 66 IN

## 2022-01-04 DIAGNOSIS — Z12.31 VISIT FOR SCREENING MAMMOGRAM: ICD-10-CM

## 2022-01-04 DIAGNOSIS — E89.40 SURGICAL MENOPAUSE ON HORMONE REPLACEMENT THERAPY: ICD-10-CM

## 2022-01-04 DIAGNOSIS — Z01.419 ENCOUNTER FOR GYNECOLOGICAL EXAMINATION WITHOUT ABNORMAL FINDING: Primary | ICD-10-CM

## 2022-01-04 DIAGNOSIS — N95.1 SYMPTOMATIC MENOPAUSAL OR FEMALE CLIMACTERIC STATES: ICD-10-CM

## 2022-01-04 DIAGNOSIS — Z79.890 SURGICAL MENOPAUSE ON HORMONE REPLACEMENT THERAPY: ICD-10-CM

## 2022-01-04 PROCEDURE — 77067 SCR MAMMO BI INCL CAD: CPT | Mod: TC | Performed by: RADIOLOGY

## 2022-01-04 PROCEDURE — 77063 BREAST TOMOSYNTHESIS BI: CPT | Mod: TC | Performed by: RADIOLOGY

## 2022-01-04 PROCEDURE — 99396 PREV VISIT EST AGE 40-64: CPT | Performed by: OBSTETRICS & GYNECOLOGY

## 2022-01-04 RX ORDER — ESTRADIOL 0.5 MG/1
TABLET ORAL
Qty: 90 TABLET | Refills: 3 | Status: SHIPPED | OUTPATIENT
Start: 2022-01-04 | End: 2022-10-27

## 2022-01-04 ASSESSMENT — PATIENT HEALTH QUESTIONNAIRE - PHQ9
5. POOR APPETITE OR OVEREATING: NOT AT ALL
SUM OF ALL RESPONSES TO PHQ QUESTIONS 1-9: 2

## 2022-01-04 ASSESSMENT — MIFFLIN-ST. JEOR: SCORE: 1540.98

## 2022-01-04 ASSESSMENT — ANXIETY QUESTIONNAIRES
IF YOU CHECKED OFF ANY PROBLEMS ON THIS QUESTIONNAIRE, HOW DIFFICULT HAVE THESE PROBLEMS MADE IT FOR YOU TO DO YOUR WORK, TAKE CARE OF THINGS AT HOME, OR GET ALONG WITH OTHER PEOPLE: NOT DIFFICULT AT ALL
2. NOT BEING ABLE TO STOP OR CONTROL WORRYING: NOT AT ALL
1. FEELING NERVOUS, ANXIOUS, OR ON EDGE: NOT AT ALL
5. BEING SO RESTLESS THAT IT IS HARD TO SIT STILL: NOT AT ALL
6. BECOMING EASILY ANNOYED OR IRRITABLE: NOT AT ALL
GAD7 TOTAL SCORE: 0
3. WORRYING TOO MUCH ABOUT DIFFERENT THINGS: NOT AT ALL
7. FEELING AFRAID AS IF SOMETHING AWFUL MIGHT HAPPEN: NOT AT ALL

## 2022-01-04 NOTE — PROGRESS NOTES
Christine is a 51 year old  female who presents for annual exam.     Besides routine health maintenance, she has no other health concerns today .    HPI:  Here today for yearly exam --doing well.  S/p LASH in  and BSO with me in . No vb/spotting.  Started HRT last winter after surgery for significant hot flushes and night sweats --significantly better.  Maybe has one night sweat per week.  Manageable.  +SA --denies vaginal dryness or pain.  No bowel/bladder issues.  Has noticed worsening of hemorrhoids in the past few months.  Was having some constipation when starting new diet but seems to have improved as of late.  No urinary leaking or incontinence.  Has also had cystocele repair and sling placement in .  Slight urgency.      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    -staying active with twice weekly aqua aerobics; also started [employer]avia diet (Medifast) diet earlier this year --caloric restriction using [employer] meal packs/snacks; will do 5 snacks/packs and 1 lean and green meal until she reaches her goal weight of 145# and then transitions to maintenance; hoping to reach goal but wedding in April; recommends only light intensity workouts due to low caloric intake  +mammo already today; +SBE --no issues  PCP -Prudencio Conde MD --saw already this year and will bloodwork and flu/Tdap vaccines at that time  -up to date on colonoscopy; will repeat after 5yrs  -has declined covid vaccines      GYNECOLOGIC HISTORY:    Patient's last menstrual period was 2014.    Her current contraception method is: hysterectomy.  She  reports that she has never smoked. She has never used smokeless tobacco.    Patient is sexually active.  STD testing offered?  Declined  Last PHQ-9 score on record =   PHQ-9 SCORE 2022   PHQ-9 Total Score -   PHQ-9 Total Score 2     Last GAD7 score on record =   JAYDE-7 SCORE 2022   Total Score -   Total Score 0      Alcohol Score = 1    HEALTH MAINTENANCE:  Cholesterol:   Recent Labs   Lab Test 10/05/20  1030 19  1039 17  1147 14  0930   CHOL 191 199   < > 202*   HDL 34* 40*   < > 45*   * 125*   < > 116   TRIG 201* 171*   < > 199*   CHOLHDLRATIO  --   --   --  4.5    < > = values in this interval not displayed.   Last Mammo: 2020, Result: Normal, Next Mammo: Today  Pap:   Lab Results   Component Value Date    PAP NIL 2017     Colonoscopy:  2020, Result: Normal, Next Colonoscopy: 3 years.  Dexa:  N/a    Health maintenance updated:  yes    HISTORY:  OB History    Para Term  AB Living   5 4 3 1 1 4   SAB IAB Ectopic Multiple Live Births   1 0 0 0 4      # Outcome Date GA Lbr Amol/2nd Weight Sex Delivery Anes PTL Lv   5 Term 07 37w0d  3.118 kg (6 lb 14 oz) M    SHAN      Birth Comments: was on tegretol during preg for seizure disorder      Name: Garrick   4 Term 02 37w0d  2.892 kg (6 lb 6 oz) M    SHAN      Name: Tone   3 Term 99 40w0d  3.714 kg (8 lb 3 oz) F    SHAN   2  97 35w0d   M -SEC   SHAN      Birth Comments: breech      Name: Giancarlo Vizcarra SAB                Patient Active Problem List   Diagnosis     Hypertension goal BP (blood pressure) < 130/80     Migraine     Osteoarthritis     Knee joint replacement by other means     Abnormal gait     Low back pain     Synovial cyst     History of arthroplasty of left knee     Posttraumatic stress disorder     Hypothyroidism, unspecified type     Arthritis of right hand     Arthralgia of right acromioclavicular joint     Anxiety     Surgical menopause on hormone replacement therapy     Past Surgical History:   Procedure Laterality Date     ARTHROPLASTY KNEE UNICOMPARTMENT Right 2015    Procedure: ARTHROPLASTY KNEE UNICOMPARTMENT;  Surgeon: Pablo Lara MD;  Location:  OR     ARTHROPLASTY KNEE UNICOMPARTMENT Left 10/27/2015    Procedure: ARTHROPLASTY KNEE  UNICOMPARTMENT;  Surgeon: Pablo Lara MD;  Location:  OR      SECTION  1997    for breech     CYCTOCELE REPAIR      sling     CYSTOSCOPY  2014    Procedure: CYSTOSCOPY;;  Surgeon: Candie Crandall MD;  Location:  [facility]     DECOMPRESSION LUMBAR ONE LEVEL Right 2015    Procedure: DECOMPRESSION LUMBAR ONE LEVEL;  Surgeon: Elio Starr MD;  Location:  OR     DILATION AND CURETTAGE, OPERATIVE HYSTEROSCOPY, COMBINED  2012    Procedure:COMBINED DILATION AND CURETTAGE, OPERATIVE HYSTEROSCOPY; OPERATIVE HYSTEROSCOPY, DILATION AND CURETTAGE, POSSIBLE RESECTION OF POLYP; Surgeon:CANDIE CRANDALL; Location:[facility]     IR CERVICAL EPIDURAL STEROID INJECTION  2012     LAPAROSCOPIC HYSTERECTOMY SUPRACERVICAL  2014    Procedure: LAPAROSCOPIC HYSTERECTOMY SUPRACERVICAL;  LAPAROSCOPIC ASSISTED SUPRACERVICAL HYSTERECTOMY, cystoscopy;  Surgeon: Candie Crandall MD;  Location: [facility]     LAPAROSCOPIC OOPHORECTOMY Bilateral 2020    Procedure: LAPAROSCOPIC BILATERAL OOPHORECTOMY;  Surgeon: Christiana Andersen MD;  Location:  OR     STRIP VEIN  2010     TONSILLECTOMY  1974      Social History     Tobacco Use     Smoking status: Never Smoker     Smokeless tobacco: Never Used   Substance Use Topics     Alcohol use: Yes     Alcohol/week: 0.0 standard drinks     Comment: Socially      Problem (# of Occurrences) Relation (Name,Age of Onset)    Cerebrovascular Disease (1) Paternal Grandmother: TIA    Diabetes (4) Maternal Grandmother (Amy), Maternal Grandfather (Willis), Paternal Grandmother, Paternal Grandfather    Fractures (1) Maternal Grandmother (Amy): Hip    Hyperlipidemia (2) Mother (Raiza): high triglycerides, Father (Remy): not on meds    Hypertension (4) Mother (Raiza), Father (Remy), Maternal Grandmother (Amy), Paternal Grandmother    Hypothyroidism (1) Daughter    Lung Cancer (1) Maternal Grandfather (Willis,  "68): smoker    No Known Problems (3) Son, Son, Son    Osteoporosis (1) Maternal Grandmother (Amy)    Stomach Cancer (1) Paternal Grandfather (68)    Thyroid Disease (1) Mother (Raiza)       Negative family history of: Coronary Artery Disease, Breast Cancer, Colon Cancer            Current Outpatient Medications   Medication Sig     Calcium-Magnesium-Zinc 333-133-5 MG TABS Take 1 tablet by mouth At Bedtime     escitalopram (LEXAPRO) 20 MG tablet Take 1 tablet (20 mg) by mouth daily     estradiol (ESTRACE) 0.5 MG tablet TAKE 1 TABLET(0.5 MG) BY MOUTH DAILY     IRON-FOLIC ACID PO      KRILL OIL PO Take 1 capsule by mouth every evening      levothyroxine (SYNTHROID/LEVOTHROID) 137 MCG tablet Take 1 tablet (137 mcg) by mouth daily     lisinopril (ZESTRIL) 10 MG tablet Take 1 tablet (10 mg) by mouth daily     meloxicam (MOBIC) 15 MG tablet Take 1 tablet (15 mg) by mouth daily     olopatadine (PATANOL) 0.1 % ophthalmic solution PLACE 1 DROP INTO BOTH EYES TWICE DAILY     Pediatric Multivit-Minerals-C (MULTIVITAMIN GUMMIES CHILDRENS PO) Take 2 chew tab by mouth daily as needed     No current facility-administered medications for this visit.     Allergies   Allergen Reactions     Imitrex [Sumatriptan] Other (See Comments)     Throat tightness       Past medical, surgical, social and family histories were reviewed and updated in EPIC.    ROS:   12 point review of systems negative other than symptoms noted below or in the HPI.  No urinary frequency or dysuria, bladder or kidney problems    EXAM:  /72   Pulse 80   Ht 1.664 m (5' 5.5\")   Wt 91.7 kg (202 lb 3.2 oz)   LMP 03/17/2014   Breastfeeding No   BMI 33.14 kg/m     BMI: Body mass index is 33.14 kg/m .    PHYSICAL EXAM:  Constitutional:   Appearance: Well nourished, well developed, alert, in no acute distress  Neck:  Lymph Nodes:  No lymphadenopathy present    Thyroid:  Gland size normal, nontender, no nodules or masses present  on " palpation  Chest:  Respiratory Effort:  Breathing unlabored  Cardiovascular:    Heart: Auscultation:  Regular rate, normal rhythm, no murmurs present  Breasts: Palpation of Breasts and Axillae:  No masses present on palpation, no breast tenderness., Axillary Lymph Nodes:  No lymphadenopathy present. and No nodularity, asymmetry or nipple discharge bilaterally.  Gastrointestinal:   Abdominal Examination:  Abdomen nontender to palpation, tone normal without rigidity or guarding, no masses present, umbilicus without lesions   Liver and Spleen:  No hepatomegaly present, liver nontender to palpation    Hernias:  No hernias present  Lymphatic: Lymph Nodes:  No other lymphadenopathy present  Skin:  General Inspection:  No rashes present, no lesions present, no areas of  discoloration  Neurologic:    Mental Status:  Oriented X3.  Normal strength and tone, sensory exam                grossly normal, mentation intact and speech normal.    Psychiatric:   Mentation appears normal and affect normal/bright.         Pelvic Exam:  External Genitalia:     Normal appearance for age, no discharge present, no tenderness present, no inflammatory lesions present, color normal  Vagina:     Normal vaginal vault without central or paravaginal defects, no discharge present, no inflammatory lesions present, no masses present  Bladder:     Nontender to palpation  Urethra:   Urethral Body:  Urethra palpation normal, urethra structural support normal   Urethral Meatus:  No erythema or lesions present  Cervix:    Appearance healthy, no lesions present, nontender to palpation, no bleeding present  Uterus:     Surgically absent  Adnexa:     Surgically absent  Perineum:     Perineum within normal limits, no evidence of trauma, no rashes or skin lesions present  Anus:     Anus within normal limits, no hemorrhoids present  Inguinal Lymph Nodes:     No lymphadenopathy present  Pubic Hair:     Normal pubic hair distribution for age  Genitalia and Groin:      No rashes present, no lesions present, no areas of discoloration, no masses present      COUNSELING:   Reviewed preventive health counseling, as reflected in patient instructions  Special attention given to:        Regular exercise       Healthy diet/nutrition       Colon cancer screening       (Amanda)menopause management    BMI: Body mass index is 33.14 kg/m .  Weight management plan: Patient was referred to their PCP to discuss a diet and exercise plan.    ASSESSMENT:  51 year old female with satisfactory annual exam.    ICD-10-CM    1. Encounter for gynecological examination without abnormal finding  Z01.419    2. Symptomatic menopausal or female climacteric states  N95.1 estradiol (ESTRACE) 0.5 MG tablet   3. Surgical menopause on hormone replacement therapy  E89.40     Z79.890        PLAN:  Patient Instructions   Follow up with your primary care provider for your other medical problems.  Continue self breast exam.  Increase physical activity and exercise.  Usual safety and preventative measures counseling done.  BMI >25  Weight loss encouraged.  Last pap smear (2017) was normal and negative for the DNA of high risk HPV subtypes.  No pap was obtained this year.  This was discussed with the patient and she agrees with the plan.  [facility] study was discussed in detail including current information.  The patient requests continuation of hormone therapy.        Christiana Andersen MD

## 2022-01-04 NOTE — PATIENT INSTRUCTIONS
Follow up with your primary care provider for your other medical problems.  Continue self breast exam.  Increase physical activity and exercise.  Usual safety and preventative measures counseling done.  BMI >25  Weight loss encouraged.  Last pap smear (2017) was normal and negative for the DNA of high risk HPV subtypes.  No pap was obtained this year.  This was discussed with the patient and she agrees with the plan.  [facility] study was discussed in detail including current information.  The patient requests continuation of hormone therapy.

## 2022-01-05 ASSESSMENT — ANXIETY QUESTIONNAIRES: GAD7 TOTAL SCORE: 0

## 2022-01-11 ENCOUNTER — LAB (OUTPATIENT)
Dept: LAB | Facility: CLINIC | Age: 52
End: 2022-01-11
Payer: COMMERCIAL

## 2022-01-11 ENCOUNTER — ALLIED HEALTH/NURSE VISIT (OUTPATIENT)
Dept: FAMILY MEDICINE | Facility: CLINIC | Age: 52
End: 2022-01-11
Payer: COMMERCIAL

## 2022-01-11 DIAGNOSIS — Z13.220 SCREENING FOR HYPERLIPIDEMIA: ICD-10-CM

## 2022-01-11 DIAGNOSIS — I10 HYPERTENSION GOAL BP (BLOOD PRESSURE) < 130/80: ICD-10-CM

## 2022-01-11 DIAGNOSIS — Z23 ENCOUNTER FOR IMMUNIZATION: Primary | ICD-10-CM

## 2022-01-11 DIAGNOSIS — E03.9 HYPOTHYROIDISM, UNSPECIFIED TYPE: Primary | ICD-10-CM

## 2022-01-11 PROCEDURE — 84443 ASSAY THYROID STIM HORMONE: CPT

## 2022-01-11 PROCEDURE — 90715 TDAP VACCINE 7 YRS/> IM: CPT

## 2022-01-11 PROCEDURE — 80061 LIPID PANEL: CPT

## 2022-01-11 PROCEDURE — 99207 PR NO CHARGE LOS: CPT

## 2022-01-11 PROCEDURE — 36415 COLL VENOUS BLD VENIPUNCTURE: CPT

## 2022-01-11 PROCEDURE — 90471 IMMUNIZATION ADMIN: CPT

## 2022-01-11 PROCEDURE — 80053 COMPREHEN METABOLIC PANEL: CPT

## 2022-01-12 LAB
ALBUMIN SERPL-MCNC: 3.3 G/DL (ref 3.4–5)
ALP SERPL-CCNC: 71 U/L (ref 40–150)
ALT SERPL W P-5'-P-CCNC: 27 U/L (ref 0–50)
ANION GAP SERPL CALCULATED.3IONS-SCNC: 6 MMOL/L (ref 3–14)
AST SERPL W P-5'-P-CCNC: 18 U/L (ref 0–45)
BILIRUB SERPL-MCNC: 0.5 MG/DL (ref 0.2–1.3)
BUN SERPL-MCNC: 21 MG/DL (ref 7–30)
CALCIUM SERPL-MCNC: 9.5 MG/DL (ref 8.5–10.1)
CHLORIDE BLD-SCNC: 106 MMOL/L (ref 94–109)
CHOLEST SERPL-MCNC: 153 MG/DL
CO2 SERPL-SCNC: 28 MMOL/L (ref 20–32)
CREAT SERPL-MCNC: 0.76 MG/DL (ref 0.52–1.04)
FASTING STATUS PATIENT QL REPORTED: YES
GFR SERPL CREATININE-BSD FRML MDRD: >90 ML/MIN/1.73M2
GLUCOSE BLD-MCNC: 84 MG/DL (ref 70–99)
HDLC SERPL-MCNC: 31 MG/DL
LDLC SERPL CALC-MCNC: 80 MG/DL
NONHDLC SERPL-MCNC: 122 MG/DL
POTASSIUM BLD-SCNC: 4.1 MMOL/L (ref 3.4–5.3)
PROT SERPL-MCNC: 6.5 G/DL (ref 6.8–8.8)
SODIUM SERPL-SCNC: 140 MMOL/L (ref 133–144)
TRIGL SERPL-MCNC: 211 MG/DL
TSH SERPL DL<=0.005 MIU/L-ACNC: 3.04 MU/L (ref 0.4–4)

## 2022-01-14 NOTE — RESULT ENCOUNTER NOTE
Dear Candie,    Here is a summary of your recent test results:  -HDL(good) cholesterol level is low and your triglycerides are elevated which can increase your heart disease risk (but LDL cholesterol is improved which is good) A diet high in fat and simple carbohydrates, genetics and being overweight can contribute to this. LDL(bad) cholesterol level is normal.  ADVISE:exercising 150 minutes of aerobic exercise per week (30 minutes 5 days per week or 50 minutes 3 days per week are options), and omega-3 fatty acids (fish oil) 6927-1891 mg daily are helpful to improve this.   -Liver and gallbladder tests are normal (ALT,AST, Alk phos, bilirubin), kidney function is normal (Cr, GFR), sodium is normal, potassium is normal, calcium is normal, glucose is normal.  -TSH (thyroid stimulating hormone) level is normal which indicates appropriate thyroid replacement dosing.  ADVISE: continuing same replacement dose and rechecking this in 12 months.    For additional lab test information, labtestsonline.org is an excellent reference.    In addition, here is a list of due or overdue Health Maintenance reminders:  COVID-19 Vaccine(1) Never done  Zoster (Shingles) Vaccine(1 of 2) Never done  Flu Vaccine(1) due on 09/01/2021  Kidney Microalbumin Urine Test due on 10/05/2021  HPV Screening due on 06/23/2022  PAP Smear due on 06/23/2022    Please call us at [phone] (or use [employer]) to address the above recommendations if needed.           Thank you very much for trusting me and [facility].     Have a peaceful day.    Healthy regards,  Sonny Conde MD

## 2022-05-20 ENCOUNTER — OFFICE VISIT (OUTPATIENT)
Dept: OBGYN | Facility: CLINIC | Age: 52
End: 2022-05-20
Payer: COMMERCIAL

## 2022-05-20 VITALS — BODY MASS INDEX: 34.09 KG/M2 | SYSTOLIC BLOOD PRESSURE: 122 MMHG | DIASTOLIC BLOOD PRESSURE: 86 MMHG | WEIGHT: 208 LBS

## 2022-05-20 DIAGNOSIS — R39.15 URINARY URGENCY: Primary | ICD-10-CM

## 2022-05-20 DIAGNOSIS — N39.41 URGENCY INCONTINENCE: ICD-10-CM

## 2022-05-20 LAB
ALBUMIN UR-MCNC: NEGATIVE MG/DL
APPEARANCE UR: CLEAR
BILIRUB UR QL STRIP: NEGATIVE
COLOR UR AUTO: YELLOW
GLUCOSE UR STRIP-MCNC: NEGATIVE MG/DL
HGB UR QL STRIP: NEGATIVE
KETONES UR STRIP-MCNC: ABNORMAL MG/DL
LEUKOCYTE ESTERASE UR QL STRIP: NEGATIVE
NITRATE UR QL: NEGATIVE
PH UR STRIP: 6.5 [PH] (ref 5–7)
RBC #/AREA URNS AUTO: ABNORMAL /HPF
SP GR UR STRIP: 1.02 (ref 1–1.03)
SQUAMOUS #/AREA URNS AUTO: ABNORMAL /LPF
UROBILINOGEN UR STRIP-ACNC: 0.2 E.U./DL
WBC #/AREA URNS AUTO: ABNORMAL /HPF

## 2022-05-20 PROCEDURE — 81001 URINALYSIS AUTO W/SCOPE: CPT | Performed by: OBSTETRICS & GYNECOLOGY

## 2022-05-20 PROCEDURE — 99213 OFFICE O/P EST LOW 20 MIN: CPT | Performed by: OBSTETRICS & GYNECOLOGY

## 2022-05-20 RX ORDER — MIRABEGRON 25 MG/1
25 TABLET, FILM COATED, EXTENDED RELEASE ORAL DAILY
Qty: 60 TABLET | Refills: 1 | Status: SHIPPED | OUTPATIENT
Start: 2022-05-20 | End: 2022-05-24

## 2022-05-20 NOTE — PATIENT INSTRUCTIONS
Discussed different types of incontinence including stress urinary incontinence vs urgency incontinence.  Discussed different mechanisms and therefore different treatments.  With fairly straightforward urgency incontinence, we generally will treat with medications for overactive bladder to help quiet the bladder muscular activity/spasms.  Discussed anticholinergics vs myrbetriq and would would like to try the latter if approved by insurance.   Will do voiding diary as we get her medication up and running and follow up with me in 3-4 weeks.  Also discussed alternatives of nerve stimulation vs botox if medications do not work well.  Offered Megan Wyman or Neil as resources as needed.

## 2022-05-20 NOTE — PROGRESS NOTES
SUBJECTIVE:                                                   Christine Dubose is a 52 year old female who presents to clinic today for the following health issue(s):  Patient presents with:  Urinary Problem: C/o urinary urgency getting worse over the last 9 months      HPI:  Here today for urinary urgency incontinence issues.  Has been an issue over the last 6-9 months and seemingly worse in the last several weeks.  Always racing to get to the bathroom in time and often struggling to get button undone and pants down in time once in the bathroom!  Has had 3 outright accidents of full bladder opening when she didn't make it in time.  Has had sling and anterior repair in  and denies any issues with stress urinary incontinence.  No leaking with cough, laugh, sneeze, etc.    Voiding every 2-3hrs during the daytime hours.  Getting up only once overnight and no nighttime accidents.   No pain or burning with urination.  Normal stream.  No significant vaginal dryness or pain since her BSO with me in .  No bowel issues  No abdominal pain or cramping    Patient's last menstrual period was 2014..     Patient is sexually active, .  Using hysterectomy for contraception.    reports that she has never smoked. She has never used smokeless tobacco.    STD testing offered?  Declined    Health maintenance updated:  yes      Problem list and histories reviewed & adjusted, as indicated.  Additional history: as documented.    Patient Active Problem List   Diagnosis     Hypertension goal BP (blood pressure) < 130/80     Migraine     Osteoarthritis     Knee joint replacement by other means     Abnormal gait     Low back pain     Synovial cyst     History of arthroplasty of left knee     Posttraumatic stress disorder     Hypothyroidism, unspecified type     Arthritis of right hand     Arthralgia of right acromioclavicular joint     Anxiety     Surgical menopause on hormone replacement therapy     Past Surgical  History:   Procedure Laterality Date     ARTHROPLASTY KNEE UNICOMPARTMENT Right 2015    Procedure: ARTHROPLASTY KNEE UNICOMPARTMENT;  Surgeon: Pablo Lara MD;  Location:  OR     ARTHROPLASTY KNEE UNICOMPARTMENT Left 10/27/2015    Procedure: ARTHROPLASTY KNEE UNICOMPARTMENT;  Surgeon: Pablo Lara MD;  Location:  OR      SECTION  1997    for breech     CYCTOCELE REPAIR  2010    sling     CYSTOSCOPY  2014    Procedure: CYSTOSCOPY;;  Surgeon: Candie Crandall MD;  Location: [facility]     DECOMPRESSION LUMBAR ONE LEVEL Right 2015    Procedure: DECOMPRESSION LUMBAR ONE LEVEL;  Surgeon: Elio Starr MD;  Location:  OR     DILATION AND CURETTAGE, OPERATIVE HYSTEROSCOPY, COMBINED  2012    Procedure:COMBINED DILATION AND CURETTAGE, OPERATIVE HYSTEROSCOPY; OPERATIVE HYSTEROSCOPY, DILATION AND CURETTAGE, POSSIBLE RESECTION OF POLYP; Surgeon:CANDIE CRANDALL; Location:[facility]     IR CERVICAL EPIDURAL STEROID INJECTION  2012     LAPAROSCOPIC HYSTERECTOMY SUPRACERVICAL  2014    Procedure: LAPAROSCOPIC HYSTERECTOMY SUPRACERVICAL;  LAPAROSCOPIC ASSISTED SUPRACERVICAL HYSTERECTOMY, cystoscopy;  Surgeon: Candie Crandall MD;  Location: [facility]     LAPAROSCOPIC OOPHORECTOMY Bilateral 2020    Procedure: LAPAROSCOPIC BILATERAL OOPHORECTOMY;  Surgeon: Christiana Andersen MD;  Location:  OR     STRIP VEIN  2010     TONSILLECTOMY  1974      Social History     Tobacco Use     Smoking status: Never Smoker     Smokeless tobacco: Never Used   Substance Use Topics     Alcohol use: Yes     Alcohol/week: 0.0 standard drinks     Comment: Socially      Problem (# of Occurrences) Relation (Name,Age of Onset)    Cerebrovascular Disease (1) Paternal Grandmother: TIA    Diabetes (4) Maternal Grandmother (Amy), Maternal Grandfather (Willis), Paternal Grandmother, Paternal Grandfather    Fractures (1) Maternal Grandmother (Amy): Hip     Hyperlipidemia (2) Mother (Raiza): high triglycerides, Father (Remy): not on meds    Hypertension (4) Mother (Raiza), Father (Remy), Maternal Grandmother (Amy), Paternal Grandmother    Hypothyroidism (1) Daughter    Lung Cancer (1) Maternal Grandfather (Willis, [place]oro): smoker    No Known Problems (3) Son, Son, Son    Osteoporosis (1) Maternal Grandmother (Amy)    Stomach Cancer (1) Paternal Grandfather (68)    Thyroid Disease (1) Mother (Raiza)       Negative family history of: Coronary Artery Disease, Breast Cancer, Colon Cancer            Current Outpatient Medications   Medication Sig     Calcium-Magnesium-Zinc 333-133-5 MG TABS Take 1 tablet by mouth At Bedtime     escitalopram (LEXAPRO) 20 MG tablet TAKE 1 TABLET(20 MG) BY MOUTH DAILY     estradiol (ESTRACE) 0.5 MG tablet TAKE 1 TABLET(0.5 MG) BY MOUTH DAILY     IRON-FOLIC ACID PO      KRILL OIL PO Take 1 capsule by mouth every evening      levothyroxine (SYNTHROID/LEVOTHROID) 137 MCG tablet Take 1 tablet (137 mcg) by mouth daily     lisinopril (ZESTRIL) 10 MG tablet Take 1 tablet (10 mg) by mouth daily     meloxicam (MOBIC) 15 MG tablet Take 1 tablet (15 mg) by mouth daily     mirabegron (MYRBETRIQ) 25 MG 24 hr tablet Take 1 tablet (25 mg) by mouth daily     olopatadine (PATANOL) 0.1 % ophthalmic solution PLACE 1 DROP INTO BOTH EYES TWICE DAILY     Pediatric Multivit-Minerals-C (MULTIVITAMIN GUMMIES CHILDRENS PO) Take 2 chew tab by mouth daily as needed     No current facility-administered medications for this visit.     Allergies   Allergen Reactions     Imitrex [Sumatriptan] Other (See Comments)     Throat tightness       ROS:  12 point review of systems negative other than symptoms noted below or in the HPI.  Genitourinary: Urgency  POSITIVE for:, urinary frequency, urgency      OBJECTIVE:     /86   Wt 94.3 kg (208 lb)   LMP 03/17/2014   Breastfeeding No   BMI 34.09 kg/m    Body mass index is 34.09 kg/m .    Exam:  Constitutional:   Appearance: Well nourished, well developed alert, in no acute distress  Neurologic:  Mental Status:  Oriented X3.  Normal strength and tone, sensory exam grossly normal, mentation intact and speech normal.    Psychiatric:  Mentation appears normal and affect normal/bright.     In-Clinic Test Results:  Results for orders placed or performed in visit on 05/20/22 (from the past 24 hour(s))   UA with Microscopic reflex to Culture - Clinic Collect    Specimen: Urine, Midstream   Result Value Ref Range    Color Urine Yellow Colorless, Straw, Light Yellow, Yellow    Appearance Urine Clear Clear    Glucose Urine Negative Negative mg/dL    Bilirubin Urine Negative Negative    Ketones Urine Trace (A) Negative mg/dL    Specific Gravity Urine 1.020 1.003 - 1.035    Blood Urine Negative Negative    pH Urine 6.5 5.0 - 7.0    Protein Albumin Urine Negative Negative mg/dL    Urobilinogen Urine 0.2 0.2, 1.0 E.U./dL    Nitrite Urine Negative Negative    Leukocyte Esterase Urine Negative Negative   Urine Microscopic   Result Value Ref Range    RBC Urine 0-2 0-2 /HPF /HPF    WBC Urine 0-5 0-5 /HPF /HPF    Squamous Epithelials Urine Few (A) None Seen /LPF    Narrative    Urine Culture not indicated       ASSESSMENT/PLAN:                                                        ICD-10-CM    1. Urinary urgency  R39.15 UA with Microscopic reflex to Culture - Clinic Collect     Urine Microscopic   2. Urgency incontinence  N39.41 mirabegron (MYRBETRIQ) 25 MG 24 hr tablet       Patient Instructions   Discussed different types of incontinence including stress urinary incontinence vs urgency incontinence.  Discussed different mechanisms and therefore different treatments.  With fairly straightforward urgency incontinence, we generally will treat with medications for overactive bladder to help quiet the bladder muscular activity/spasms.  Discussed anticholinergics vs myrbetriq and would would like to try the latter if approved by insurance.   Will  do voiding diary as we get her medication up and running and follow up with me in 3-4 weeks.  Also discussed alternatives of nerve stimulation vs botox if medications do not work well.  Offered Megan Wyman or Neil as resources as needed.      Christiana Andersen MD  [facility]

## 2022-05-23 ENCOUNTER — TELEPHONE (OUTPATIENT)
Dept: OBGYN | Facility: CLINIC | Age: 52
End: 2022-05-23
Payer: COMMERCIAL

## 2022-05-23 DIAGNOSIS — N39.41 URGENCY INCONTINENCE: ICD-10-CM

## 2022-05-23 DIAGNOSIS — R39.15 URINARY URGENCY: Primary | ICD-10-CM

## 2022-05-23 NOTE — TELEPHONE ENCOUNTER
Patient spoke with her pharmacy and mirabegron (MYRBETRIQ) 25 MG 24 hr tablet would be $800.  Patient prefers a different medication that  had discussed.  Would like to know the names of medications prior to sending over to the pharmacy and what  thinks would be best option for her versus the MYRBETRIQ.    Routing to  to review and advise.    Lexii Guajardo RN

## 2022-05-24 RX ORDER — TOLTERODINE 4 MG/1
4 CAPSULE, EXTENDED RELEASE ORAL DAILY
Qty: 90 CAPSULE | Refills: 3 | Status: SHIPPED | OUTPATIENT
Start: 2022-05-24 | End: 2022-06-23

## 2022-05-24 NOTE — TELEPHONE ENCOUNTER
Informed pt of recommendation below and will try detrol LA 4mg. Rx sent to preferred pharmacy.    Pt verbalized understanding, in agreement with plan, and voiced no further questions.  Viktoriya Del Rosario RN on 5/24/2022 at 8:56 AM

## 2022-06-21 PROBLEM — R39.15 URINARY URGENCY: Status: ACTIVE | Noted: 2022-06-21

## 2022-06-22 NOTE — PROGRESS NOTES
SUBJECTIVE:                                                   Crhistine Dubose is a 52 year old female who presents to clinic today for the following health issue(s):  Patient presents with:  Recheck Medication: Follow up to Detrol, feels like it shelping      HPI:  Here today for follow up to initiation of medication for urinary urgency.  Was prescribed myrbetriq but changed to detrol LA due to cost.  Has done very well --maybe have even over corrected her issue as she reports occ really needing to work to get her bladder to empty.  Once she gets started, no further issues with stream.  Very happy with results but would like to try slightly lower dose.  Mild dry eyes and dry nasal mucosa but tolerable  Not getting up overnight at all  Traveling to [place] to visit her daughter next week    Patient's last menstrual period was 2014..     Patient is sexually active, .  Using hysterectomy for contraception.    reports that she has never smoked. She has never used smokeless tobacco.    STD testing offered?  Declined    Health maintenance updated:  yes    Today's PHQ-2 Score:   PHQ-2 (  [employer]) 2021   Q1: Little interest or pleasure in doing things 1   Q2: Feeling down, depressed or hopeless 0   PHQ-2 Score 1   PHQ-2 Total Score (12-17 Years)- Positive if 3 or more points; Administer PHQ-A if positive 1   Q1: Little interest or pleasure in doing things -   Q2: Feeling down, depressed or hopeless -   PHQ-2 Score -     Today's PHQ-9 Score:   PHQ-9 SCORE 2022   PHQ-9 Total Score -   PHQ-9 Total Score 2     Today's JAYDE-7 Score:   JAYDE-7 SCORE 2022   Total Score -   Total Score 0       Problem list and histories reviewed & adjusted, as indicated.  Additional history: as documented.    Patient Active Problem List   Diagnosis     Hypertension goal BP (blood pressure) < 130/80     Migraine     Osteoarthritis     Knee joint replacement by other means     Abnormal gait     Low back pain     Synovial  cyst     History of arthroplasty of left knee     Posttraumatic stress disorder     Hypothyroidism, unspecified type     Arthritis of right hand     Arthralgia of right acromioclavicular joint     Anxiety     Surgical menopause on hormone replacement therapy     Urinary urgency     Past Surgical History:   Procedure Laterality Date     ARTHROPLASTY KNEE UNICOMPARTMENT Right 2015    Procedure: ARTHROPLASTY KNEE UNICOMPARTMENT;  Surgeon: Pablo Lara MD;  Location:  OR     ARTHROPLASTY KNEE UNICOMPARTMENT Left 10/27/2015    Procedure: ARTHROPLASTY KNEE UNICOMPARTMENT;  Surgeon: Pablo Lara MD;  Location:  OR      SECTION  1997    for breech     CYCTOCELE REPAIR  2010    sling     CYSTOSCOPY  2014    Procedure: CYSTOSCOPY;;  Surgeon: Candie Crandall MD;  Location: [facility]     DECOMPRESSION LUMBAR ONE LEVEL Right 2015    Procedure: DECOMPRESSION LUMBAR ONE LEVEL;  Surgeon: Elio Starr MD;  Location:  OR     DILATION AND CURETTAGE, OPERATIVE HYSTEROSCOPY, COMBINED  2012    Procedure:COMBINED DILATION AND CURETTAGE, OPERATIVE HYSTEROSCOPY; OPERATIVE HYSTEROSCOPY, DILATION AND CURETTAGE, POSSIBLE RESECTION OF POLYP; Surgeon:CANDIE CRANDALL; Location:[facility]     IR CERVICAL EPIDURAL STEROID INJECTION  2012     LAPAROSCOPIC HYSTERECTOMY SUPRACERVICAL  2014    Procedure: LAPAROSCOPIC HYSTERECTOMY SUPRACERVICAL;  LAPAROSCOPIC ASSISTED SUPRACERVICAL HYSTERECTOMY, cystoscopy;  Surgeon: Candie Crandall MD;  Location: [facility]     LAPAROSCOPIC OOPHORECTOMY Bilateral 2020    Procedure: LAPAROSCOPIC BILATERAL OOPHORECTOMY;  Surgeon: Christiana Andersen MD;  Location:  OR     STRIP VEIN  2010     TONSILLECTOMY  1974      Social History     Tobacco Use     Smoking status: Never Smoker     Smokeless tobacco: Never Used   Substance Use Topics     Alcohol use: Yes     Alcohol/week: 0.0 standard drinks     Comment: Socially       Problem (# of Occurrences) Relation (Name,Age of Onset)    Cerebrovascular Disease (1) Paternal Grandmother: TIA    Diabetes (4) Maternal Grandmother (Amy), Maternal Grandfather (Willis), Paternal Grandmother, Paternal Grandfather    Fractures (1) Maternal Grandmother (Amy): Hip    Hyperlipidemia (2) Mother (Raiza): high triglycerides, Father (Remy): not on meds    Hypertension (4) Mother (Raiza), Father (Remy), Maternal Grandmother (Amy), Paternal Grandmother    Hypothyroidism (1) Daughter    Lung Cancer (1) Maternal Grandfather (Willis, 68): smoker    No Known Problems (3) Son, Son, Son    Osteoporosis (1) Maternal Grandmother (Amy)    Stomach Cancer (1) Paternal Grandfather (Roro)    Thyroid Disease (1) Mother (Raiza)       Negative family history of: Coronary Artery Disease, Breast Cancer, Colon Cancer            Current Outpatient Medications   Medication Sig     Calcium-Magnesium-Zinc 333-133-5 MG TABS Take 1 tablet by mouth At Bedtime     escitalopram (LEXAPRO) 20 MG tablet TAKE 1 TABLET(20 MG) BY MOUTH DAILY     estradiol (ESTRACE) 0.5 MG tablet TAKE 1 TABLET(0.5 MG) BY MOUTH DAILY     IRON-FOLIC ACID PO      KRILL OIL PO Take 1 capsule by mouth every evening      levothyroxine (SYNTHROID/LEVOTHROID) 137 MCG tablet Take 1 tablet (137 mcg) by mouth daily     lisinopril (ZESTRIL) 10 MG tablet Take 1 tablet (10 mg) by mouth daily     meloxicam (MOBIC) 15 MG tablet Take 1 tablet (15 mg) by mouth daily     olopatadine (PATANOL) 0.1 % ophthalmic solution PLACE 1 DROP INTO BOTH EYES TWICE DAILY     Pediatric Multivit-Minerals-C (MULTIVITAMIN GUMMIES CHILDRENS PO) Take 2 chew tab by mouth daily as needed     tolterodine ER (DETROL LA) 2 MG 24 hr capsule Take 2 capsules (4 mg) by mouth daily     No current facility-administered medications for this visit.     Allergies   Allergen Reactions     Imitrex [Sumatriptan] Other (See Comments)     Throat tightness       ROS:  12 point review of  systems negative other than symptoms noted below or in the HPI.  No urinary frequency or dysuria, bladder or kidney problems      OBJECTIVE:     /76   Wt 93.4 kg (206 lb)   LMP 03/17/2014   Breastfeeding No   BMI 33.76 kg/m    Body mass index is 33.76 kg/m .    Exam:  Constitutional:  Appearance: Well nourished, well developed alert, in no acute distress  Neurologic:  Mental Status:  Oriented X3.  Normal strength and tone, sensory exam grossly normal, mentation intact and speech normal.    Psychiatric:  Mentation appears normal and affect normal/bright.     In-Clinic Test Results:  No results found for this or any previous visit (from the past 24 hour(s)).    ASSESSMENT/PLAN:                                                        ICD-10-CM    1. Urgency incontinence  N39.41 tolterodine ER (DETROL LA) 2 MG 24 hr capsule       Patient Instructions   Will try slightly lower dose of the detrol LA to see if we can improve side effects of dry mouth/dry eyes and make urinary hesitancy better.  If urgency returns, will return to 4mg dosing but possibly every other day.  If still having side effects with lower dose, may consider different anticholinergic.  If all improve, will follow up with me for yearly exam when due.      Christiana Andersen MD  [facility] [place]

## 2022-06-23 ENCOUNTER — OFFICE VISIT (OUTPATIENT)
Dept: OBGYN | Facility: CLINIC | Age: 52
End: 2022-06-23
Payer: COMMERCIAL

## 2022-06-23 VITALS — SYSTOLIC BLOOD PRESSURE: 124 MMHG | WEIGHT: 206 LBS | BODY MASS INDEX: 33.76 KG/M2 | DIASTOLIC BLOOD PRESSURE: 76 MMHG

## 2022-06-23 DIAGNOSIS — N39.41 URGENCY INCONTINENCE: ICD-10-CM

## 2022-06-23 PROCEDURE — 99213 OFFICE O/P EST LOW 20 MIN: CPT | Performed by: OBSTETRICS & GYNECOLOGY

## 2022-06-23 RX ORDER — TOLTERODINE 2 MG/1
4 CAPSULE, EXTENDED RELEASE ORAL DAILY
Qty: 90 CAPSULE | Refills: 3 | Status: SHIPPED | OUTPATIENT
Start: 2022-06-23 | End: 2022-12-14

## 2022-08-30 ENCOUNTER — OFFICE VISIT (OUTPATIENT)
Dept: FAMILY MEDICINE | Facility: CLINIC | Age: 52
End: 2022-08-30
Payer: COMMERCIAL

## 2022-08-30 VITALS
RESPIRATION RATE: 16 BRPM | WEIGHT: 209.1 LBS | HEART RATE: 77 BPM | SYSTOLIC BLOOD PRESSURE: 122 MMHG | HEIGHT: 66 IN | DIASTOLIC BLOOD PRESSURE: 80 MMHG | OXYGEN SATURATION: 98 % | BODY MASS INDEX: 33.61 KG/M2 | TEMPERATURE: 97 F

## 2022-08-30 DIAGNOSIS — R22.2 MASS OF CHEST WALL, RIGHT: Primary | ICD-10-CM

## 2022-08-30 PROCEDURE — 99213 OFFICE O/P EST LOW 20 MIN: CPT | Performed by: FAMILY MEDICINE

## 2022-08-30 NOTE — PROGRESS NOTES
"  Assessment & Plan   Mass of lateral chest wall, right  Observe vs excision.  She would like to have it excised and will schedule that.  Risks of procedure discussed.        Return in about 2 weeks (around 9/13/2022) for lipoma excision.      Sonny Conde MD      [address] [address]  [facility]   Office: [phone]       Shanae Schreiber is a 52 year old, presenting for the following health issues:  Mass      History of Present Illness       Reason for visit:  Lump behind back of right arm pit  Symptom onset:  More than a month  Symptoms include:  Palpable lump  Symptom intensity:  Mild  Symptom progression:  Staying the same  Had these symptoms before:  No  What makes it worse:  No  What makes it better:  No    She eats 2-3 servings of fruits and vegetables daily.She consumes 0 sweetened beverage(s) daily.She exercises with enough effort to increase her heart rate 20 to 29 minutes per day.  She exercises with enough effort to increase her heart rate 4 days per week.   She is taking medications regularly.     Review of Systems         Objective    /80   Pulse 77   Temp 97  F (36.1  C) (Tympanic)   Resp 16   Ht 1.664 m (5' 5.5\")   Wt 94.8 kg (209 lb 1.6 oz)   LMP 03/17/2014   SpO2 98%   BMI 34.27 kg/m    Body mass index is 34.27 kg/m .  Physical Exam   GENERAL: healthy, alert and no distress  MS: Full range of motion of the right shoulder no gross musculoskeletal defects noted, no edema  SKIN: Posterior to right axilla has a 3.5 cm soft movable subcuticular mass that is nontender and most consistent with lipoma otherwise no suspicious lesions or rashes                  .  ..  "

## 2022-09-08 ENCOUNTER — OFFICE VISIT (OUTPATIENT)
Dept: FAMILY MEDICINE | Facility: CLINIC | Age: 52
End: 2022-09-08
Payer: COMMERCIAL

## 2022-09-08 VITALS
OXYGEN SATURATION: 97 % | SYSTOLIC BLOOD PRESSURE: 119 MMHG | TEMPERATURE: 97.8 F | HEART RATE: 64 BPM | HEIGHT: 66 IN | WEIGHT: 214.6 LBS | BODY MASS INDEX: 34.49 KG/M2 | DIASTOLIC BLOOD PRESSURE: 78 MMHG

## 2022-09-08 DIAGNOSIS — D17.30 LIPOMA OF SKIN AND SUBCUTANEOUS TISSUE: Primary | ICD-10-CM

## 2022-09-08 PROCEDURE — 11406 EXC TR-EXT B9+MARG >4.0 CM: CPT | Performed by: FAMILY MEDICINE

## 2022-09-08 NOTE — PROGRESS NOTES
"  Assessment & Plan   Lipoma of skin and subcutaneous tissue  Excise-see procedure note below.      BMI:   Estimated body mass index is 35.17 kg/m  as calculated from the following:    Height as of this encounter: 1.664 m (5' 5.5\").    Weight as of this encounter: 97.3 kg (214 lb 9.6 oz).         Return in about 2 weeks (around 9/22/2022) for symptoms failing to improve or sooner if worsening.      Sonny Conde MD      [address] [address]  [facility].[employer]   Office: [phone]       Shanae Schreiber is a 52 year old presenting for the following health issues:  Procedure (Excision on back.)      HPI     Lipoma excision on left lateral mid back.      Review of Systems         Objective    /78   Pulse 64   Temp 97.8  F (36.6  C) (Tympanic)   Ht 1.664 m (5' 5.5\")   Wt 97.3 kg (214 lb 9.6 oz)   LMP 03/17/2014   SpO2 97%   Breastfeeding No   BMI 35.17 kg/m    Body mass index is 35.17 kg/m .  Physical Exam   Skin- left lateral chest has a 5 cm soft mass    After informed consent was obtained, using Hibiclens for cleansing and 1% Lidocaine with epinephrine for anesthetic, with sterile technique, lipoma excision was performed by dissecting down through the subcutaneous fat to the lipoma and removal with Melba and good hemostasis.  Sutured with 4-0 subcutaneous Vicryl and Steri-Stripped. Wound care instructions provided.  Be alert for any signs of cutaneous infection. The procedure was well tolerated without complications. Follow up: The patient may return prn..            "

## 2022-09-12 PROBLEM — F41.9 ANXIETY: Status: RESOLVED | Noted: 2018-08-21 | Resolved: 2022-09-12

## 2022-10-03 ENCOUNTER — OFFICE VISIT (OUTPATIENT)
Dept: FAMILY MEDICINE | Facility: CLINIC | Age: 52
End: 2022-10-03
Payer: COMMERCIAL

## 2022-10-03 ENCOUNTER — TELEPHONE (OUTPATIENT)
Dept: FAMILY MEDICINE | Facility: CLINIC | Age: 52
End: 2022-10-03

## 2022-10-03 VITALS
TEMPERATURE: 98.1 F | DIASTOLIC BLOOD PRESSURE: 58 MMHG | OXYGEN SATURATION: 99 % | WEIGHT: 210 LBS | HEIGHT: 66 IN | BODY MASS INDEX: 33.75 KG/M2 | HEART RATE: 87 BPM | SYSTOLIC BLOOD PRESSURE: 98 MMHG

## 2022-10-03 DIAGNOSIS — B07.9 VIRAL WARTS, UNSPECIFIED TYPE: Primary | ICD-10-CM

## 2022-10-03 DIAGNOSIS — T14.8XXA WOUND INFECTION: ICD-10-CM

## 2022-10-03 DIAGNOSIS — L08.9 WOUND INFECTION: ICD-10-CM

## 2022-10-03 PROCEDURE — 17110 DESTRUCTION B9 LES UP TO 14: CPT | Performed by: FAMILY MEDICINE

## 2022-10-03 PROCEDURE — 87186 SC STD MICRODIL/AGAR DIL: CPT | Performed by: FAMILY MEDICINE

## 2022-10-03 PROCEDURE — 87077 CULTURE AEROBIC IDENTIFY: CPT | Performed by: FAMILY MEDICINE

## 2022-10-03 PROCEDURE — 99213 OFFICE O/P EST LOW 20 MIN: CPT | Mod: 25 | Performed by: FAMILY MEDICINE

## 2022-10-03 PROCEDURE — 87070 CULTURE OTHR SPECIMN AEROBIC: CPT | Performed by: FAMILY MEDICINE

## 2022-10-03 RX ORDER — CEPHALEXIN 500 MG/1
500 CAPSULE ORAL 3 TIMES DAILY
Qty: 21 CAPSULE | Refills: 0 | Status: SHIPPED | OUTPATIENT
Start: 2022-10-03 | End: 2022-10-10

## 2022-10-03 NOTE — TELEPHONE ENCOUNTER
"Pt calling in regarding lipoma removal site with possible infection. Sx started over the weekend. Sx include redness, hard at site, \"it looks like a hole has opened, when you squeeze it there is puss that comes out.\" Drainage is white and yellow in color, denies fever. Rates pain 3/10 continuous. Scheduled pt appt for today with PCP to review possible infection. No further questions.     Severo LEWIS RN    "

## 2022-10-03 NOTE — PROGRESS NOTES
"Assessment & Plan   Viral warts, unspecified type  Wart on right thumb, no prior treatment. Will freeze with liquid nitrogen today, patient consents. Follow up if wart does not resolve.  - DESTRUCT BENIGN LESION, UP TO 14    Wound infection  Likely foreign body reaction to suture from lipoma removal on 9/8/22. Removed suture knot today. Culture pending. Patient to begin cephalexin 500 mg three times daily for 7 days. Follow up if symptoms fail to resolve.  - cephALEXin (KEFLEX) 500 MG capsule  Dispense: 21 capsule; Refill: 0  - Skin Aerobic Bacterial Culture Routine      BMI:   Estimated body mass index is 34.41 kg/m  as calculated from the following:    Height as of this encounter: 1.664 m (5' 5.5\").    Weight as of this encounter: 95.3 kg (210 lb).       Return in about 3 weeks (around 10/24/2022) for symptoms failing to improve or sooner if worsening.     Damaris Odonnell MS3, has participated in the care of this patient.     Provider Disclosure:  I agree with above History, Review of Systems, Physical exam and Plan.  I have reviewed the content of the documentation and have edited it as needed. I have personally performed the services documented here and the documentation accurately represents those services and the decisions I have made.      Electronically signed by:          Sonny Conde M.D.     [address] [address]  [email or web address]   Office: [phone]       Shanae Schreiber is a 52 year old, presenting for the following health issues:  Drainage from Incision    HPI   Patient had a lipoma removed a few weeks ago on 9/8/22. The area was healing well without much pain, but did have some slight itching. However, over the weekend a day or two ago, she noticed some redness, pain and drainage from the site. She states that a white/yellow pus sometimes comes out when she squeezes it, and feels that there is an abscess forming underneath. She denies any " "fevers or chills. Patient feels overall well.    Patient also has a wart on the right thumb that she would like evaluated today. It has been present for a few months and may be enlarging. She has had several warts treated with LN and laser in the past. She denies any prior treatments to this wart.      Review of Systems   Constitutional, HEENT, cardiovascular, pulmonary, gi and gu systems are negative, except as otherwise noted.      Objective    BP 98/58 (BP Location: Left arm, Cuff Size: Adult Regular)   Pulse 87   Temp 98.1  F (36.7  C) (Tympanic)   Ht 1.664 m (5' 5.5\")   Wt 95.3 kg (210 lb)   LMP 03/17/2014   SpO2 99%   BMI 34.41 kg/m    Body mass index is 34.41 kg/m .     Physical Exam   GENERAL: healthy, alert and no distress  EYES: Eyes grossly normal to inspection, PERRL and conjunctivae and sclerae normal  NECK: no adenopathy, no asymmetry, masses, or scars and thyroid normal to palpation  MS: no gross musculoskeletal defects noted, no edema  SKIN: At site of previous lipoma excision on right lateral midback, there is an erythematous, firm nodule with yellow pus expressed with pressure, likely due to foreign body reaction from subcuticular suture. There is also a wart on right thumb, otherwise no suspicious lesions or rashes  BACK: no CVA tenderness, no paralumbar tenderness    No results found for this or any previous visit (from the past 24 hour(s)).              "

## 2022-10-05 LAB — BACTERIA SKIN AEROBE CULT: ABNORMAL

## 2022-10-05 NOTE — RESULT ENCOUNTER NOTE
Dear Candie,    Here is a summary of your recent test results:  -The wound culture grew a bacteria called Staph aureus.  It is sensitive to the antibiotic that I started you on.    For additional lab test information, www.testing.com is a very good reference.    In addition, here is a list of due or overdue Health Maintenance reminders:  COVID-19 Vaccine(1) Never done  Zoster (Shingles) Vaccine(1 of 2) Never done  Kidney Microalbumin Urine Test due on 10/05/2021  HPV Screening due on 06/23/2022  PAP Smear due on 06/23/2022  Flu Vaccine(1) due on 09/01/2022  ANNUAL REVIEW OF HM ORDERS due on 11/01/2022    Please call us at [phone] (or use [employer]) to address the above recommendations if needed.           Thank you very much for trusting me and [facility].     Have a peaceful day.    Healthy regards,  Sonny Conde MD

## 2022-10-26 DIAGNOSIS — N95.1 SYMPTOMATIC MENOPAUSAL OR FEMALE CLIMACTERIC STATES: ICD-10-CM

## 2022-10-27 RX ORDER — ESTRADIOL 0.5 MG/1
TABLET ORAL
Qty: 90 TABLET | Refills: 0 | Status: SHIPPED | OUTPATIENT
Start: 2022-10-27 | End: 2023-01-10

## 2022-10-27 NOTE — TELEPHONE ENCOUNTER
"Requested Prescriptions   Pending Prescriptions Disp Refills     estradiol (ESTRACE) 0.5 MG tablet [Pharmacy Med Name: ESTRADIOL 0.5MG TABLETS] 90 tablet 3     Sig: TAKE 1 TABLET BY MOUTH DAILY       Hormone Replacement Therapy Passed - 10/26/2022  8:08 AM        Passed - Blood pressure under 140/90 in past 12 months     BP Readings from Last 3 Encounters:   10/03/22 98/58   09/08/22 119/78   08/30/22 122/80                 Passed - Recent (12 mo) or future (30 days) visit within the authorizing provider's specialty     Patient has had an office visit with the authorizing provider or a provider within the authorizing providers department within the previous 12 mos or has a future within next 30 days. See \"Patient Info\" tab in inbasket, or \"Choose Columns\" in Meds & Orders section of the refill encounter.              Passed - Patient has mammogram in past 2 years on file if age 50-75        Passed - Medication is active on med list        Passed - Patient is 18 years of age or older        Passed - No active pregnancy on record        Passed - No positive pregnancy test on record in past 12 months           Last Written Prescription Date:  1/14/22  Last Fill Quantity: 90,  # refills: 3   Last office visit: 6/23/2022 with prescribing provider:  Ganesh   Future Office Visit:  NONE    Medication is being filled for 1 time refill only due to:  Patient needs to be seen because due for annual in January.  Kamini Noriega RN on 10/27/2022 at 5:42 AM        "

## 2022-10-28 ENCOUNTER — OFFICE VISIT (OUTPATIENT)
Dept: FAMILY MEDICINE | Facility: CLINIC | Age: 52
End: 2022-10-28
Payer: COMMERCIAL

## 2022-10-28 VITALS
DIASTOLIC BLOOD PRESSURE: 68 MMHG | HEART RATE: 67 BPM | HEIGHT: 66 IN | WEIGHT: 212 LBS | OXYGEN SATURATION: 99 % | RESPIRATION RATE: 12 BRPM | TEMPERATURE: 97.6 F | BODY MASS INDEX: 34.07 KG/M2 | SYSTOLIC BLOOD PRESSURE: 102 MMHG

## 2022-10-28 DIAGNOSIS — B07.9 VIRAL WARTS, UNSPECIFIED TYPE: Primary | ICD-10-CM

## 2022-10-28 PROBLEM — E66.01 MORBID OBESITY (H): Status: RESOLVED | Noted: 2022-10-28 | Resolved: 2022-10-28

## 2022-10-28 PROBLEM — E66.01 MORBID OBESITY (H): Status: ACTIVE | Noted: 2022-10-28

## 2022-10-28 PROCEDURE — 17110 DESTRUCTION B9 LES UP TO 14: CPT | Performed by: FAMILY MEDICINE

## 2022-10-28 PROCEDURE — 90682 RIV4 VACC RECOMBINANT DNA IM: CPT | Performed by: FAMILY MEDICINE

## 2022-10-28 PROCEDURE — 90471 IMMUNIZATION ADMIN: CPT | Performed by: FAMILY MEDICINE

## 2022-10-28 ASSESSMENT — ANXIETY QUESTIONNAIRES
GAD7 TOTAL SCORE: 1
4. TROUBLE RELAXING: SEVERAL DAYS
GAD7 TOTAL SCORE: 1
IF YOU CHECKED OFF ANY PROBLEMS ON THIS QUESTIONNAIRE, HOW DIFFICULT HAVE THESE PROBLEMS MADE IT FOR YOU TO DO YOUR WORK, TAKE CARE OF THINGS AT HOME, OR GET ALONG WITH OTHER PEOPLE: NOT DIFFICULT AT ALL
7. FEELING AFRAID AS IF SOMETHING AWFUL MIGHT HAPPEN: NOT AT ALL
8. IF YOU CHECKED OFF ANY PROBLEMS, HOW DIFFICULT HAVE THESE MADE IT FOR YOU TO DO YOUR WORK, TAKE CARE OF THINGS AT HOME, OR GET ALONG WITH OTHER PEOPLE?: NOT DIFFICULT AT ALL
7. FEELING AFRAID AS IF SOMETHING AWFUL MIGHT HAPPEN: NOT AT ALL
GAD7 TOTAL SCORE: 1
6. BECOMING EASILY ANNOYED OR IRRITABLE: NOT AT ALL
3. WORRYING TOO MUCH ABOUT DIFFERENT THINGS: NOT AT ALL
2. NOT BEING ABLE TO STOP OR CONTROL WORRYING: NOT AT ALL
1. FEELING NERVOUS, ANXIOUS, OR ON EDGE: NOT AT ALL
5. BEING SO RESTLESS THAT IT IS HARD TO SIT STILL: NOT AT ALL

## 2022-10-28 ASSESSMENT — PAIN SCALES - GENERAL: PAINLEVEL: NO PAIN (0)

## 2022-10-28 ASSESSMENT — PATIENT HEALTH QUESTIONNAIRE - PHQ9
SUM OF ALL RESPONSES TO PHQ QUESTIONS 1-9: 1
SUM OF ALL RESPONSES TO PHQ QUESTIONS 1-9: 1
10. IF YOU CHECKED OFF ANY PROBLEMS, HOW DIFFICULT HAVE THESE PROBLEMS MADE IT FOR YOU TO DO YOUR WORK, TAKE CARE OF THINGS AT HOME, OR GET ALONG WITH OTHER PEOPLE: NOT DIFFICULT AT ALL

## 2022-10-28 NOTE — PROGRESS NOTES
"  Assessment & Plan   Viral warts, unspecified type  Ongoing - and retreat  - DESTRUCT BENIGN LESION, UP TO 14    Return in about 3 weeks (around 11/18/2022) for symptoms failing to improve or sooner if worsening.      Sonny Conde MD      [address] [address]  [employer]   Office: [phone]       Shanae Schreiber is a 52 year old, presenting for the following health issues:  Derm Problem      History of Present Illness       Reason for visit:  Wart freezing, previous treatment 10/3/22 - Rt thumb     She eats 2-3 servings of fruits and vegetables daily.She consumes 0 sweetened beverage(s) daily.She exercises with enough effort to increase her heart rate 20 to 29 minutes per day.  She exercises with enough effort to increase her heart rate 4 days per week.   She is taking medications regularly.    Today's PHQ-9         PHQ-9 Total Score: 1    PHQ-9 Q9 Thoughts of better off dead/self-harm past 2 weeks :   Not at all    How difficult have these problems made it for you to do your work, take care of things at home, or get along with other people: Not difficult at all  Today's JAYDE-7 Score: 1     Review of Systems   Constitutional, HEENT, cardiovascular, pulmonary, gi and gu systems are negative, except as otherwise noted.      Objective    /68   Pulse 67   Temp 97.6  F (36.4  C) (Tympanic)   Resp 12   Ht 1.664 m (5' 5.5\")   Wt 96.2 kg (212 lb)   LMP 03/17/2014   SpO2 99%   BMI 34.74 kg/m    Body mass index is 34.74 kg/m .  Physical Exam   GENERAL: healthy, alert and no distress  SKIN: right thumb wart x 1 - cryo x 3 done otherwise no suspicious lesions or rashes                  "

## 2022-11-15 ENCOUNTER — ANCILLARY PROCEDURE (OUTPATIENT)
Dept: GENERAL RADIOLOGY | Facility: CLINIC | Age: 52
End: 2022-11-15
Attending: PODIATRIST
Payer: COMMERCIAL

## 2022-11-15 ENCOUNTER — OFFICE VISIT (OUTPATIENT)
Dept: PODIATRY | Facility: CLINIC | Age: 52
End: 2022-11-15
Payer: COMMERCIAL

## 2022-11-15 VITALS — DIASTOLIC BLOOD PRESSURE: 94 MMHG | BODY MASS INDEX: 34.41 KG/M2 | SYSTOLIC BLOOD PRESSURE: 138 MMHG | WEIGHT: 210 LBS

## 2022-11-15 DIAGNOSIS — M76.72 PERONEAL TENDONITIS, LEFT: Primary | ICD-10-CM

## 2022-11-15 DIAGNOSIS — M76.72 PERONEAL TENDONITIS, LEFT: ICD-10-CM

## 2022-11-15 PROCEDURE — 99204 OFFICE O/P NEW MOD 45 MIN: CPT | Performed by: PODIATRIST

## 2022-11-15 PROCEDURE — 73610 X-RAY EXAM OF ANKLE: CPT | Mod: TC | Performed by: RADIOLOGY

## 2022-11-15 NOTE — PATIENT INSTRUCTIONS
"Thank you for choosing [facility] Podiatry / Foot & Ankle Surgery!    DR. SEVILLA'S CLINIC LOCATIONS:     [facility] (Friday) TRIAGE LINE: [phone] [address]  APPOINTMENTS: [phone]   [place] [address] RADIOLOGY: [phone]    PHYSICAL THERAPY: [phone]    SET UP SURGERY: [phone]   [place] (Mon-Tues AM-Thurs) BILLING QUESTIONS: [phone] [address] FAX: [phone]   [place] [address]      PRICE Therapy    Many aches and pains throughout the foot and ankle can be helped with many simple treatments.  This is usually described as PRICE Therapy.      P - Protection - often times, inflammation/pain in the lower extremity is not able to improve simply because the areas involved are never allowed to rest.  Every step we take can bother the problematic area.  Protecting those areas is an important step in the healing process.  This may involve a walking cast boot, a special insert/orthotic device, an ankle brace, or simply avoiding barefoot walking.    R - Rest - in addition to protecting the foot/ankle, resting is an important, but often times difficult, treatment option.  Getting off your feet when they bother you, and specifically avoiding activities that cause pain/discomfort, are very beneficial to prevent, and treat, foot/ankle pain.  \"If there's something that makes it hurt(eg activities, shoe gear), and you keep doing the thing that makes it hurt, it's just going to keep hurting\".      I - Ice - icing regularly can help to decrease inflammation and swelling in the foot, thus decreasing pain.  Using an ice pack or a bag of frozen peas works very well.  Ice for 20 minutes multiple times per day as needed.  Do not place the ice directly on the skin as this can cause tissue damage.    C - Compression - using a compression wrap or an ACE wrap can help to decrease swelling, which can help to decrease pain.  Wearing the wraps is generally not needed " at night, but they should be worn on a regular basis when you are going to be on your feet for prolonged periods as gravity tends to pull fluids down to your feet/ankles.    E - Elevation - elevating your lower extremities multiple times daily for 15-20 minutes can help to decrease swelling, which works well in decreasing pain levels.      NSAID/Tylenol - An anti-inflammatory, like Aleve or ibuprofen, and/or a pain medication, such as Tylenol, can help to improve pain levels and get the issue resolved sooner rather than later.  Also, topical anti-inflammatory medications like Voltaren gel can be used for local treatment, with the benefit of avoiding system issues with oral medications.  Anyone with liver issues should be careful with Tylenol, and anyone with high blood pressure or heart, stomach or kidney issues should be careful with anti-inflammatories.  Please ask if you have questions about these medications, including dosage.       TENDONITIS   Tendons are the strong fibrous portions of muscles that attach to bones and allow the muscle to move a joint when it contracts. Tendons are very strong because they have a lot of force exerted on them. Sometimes tendons can become painful because they have suffered an acute injury, in which too much force was exerted at one time, or an overuse injury, in which a normal force was exerted too frequently or over a prolonged period of time. As a result, there is damage to the tendon and its surrounding soft tissue structures and they become inflammed. Because tendons do not have a great blood supply, they do not heal rapidly and the inflammation can become chronic.   Conservative treatment for tendinitis involves rest and anti-inflammatory measures. Ice is applied 15 minutes 2-3 times daily. Anti-inflammatory medications called NSAIDs (ibuprofen, example) can be taken provided they are used with caution, as they can lead to internal bleeding and increase the risk ofstroke  and heart attack. Sometimes topical nitroglycerin is prescribed to help with pain. Often your doctor will use a special shoe or removable walking cast to immobilize the tendon, allowing it to heal without further damage from use. These devices are very useful in helping tendons heal, but they may slow you down or make you feel like your hip, knee, or back are out ofalignment. This is temporary and should go away once you are out ofthe immobilization. You should not use a walking cast when showering or driving. Another option is Platelet Rich Plasma injections. (Normally done with a Sports and Orthorapedic doctor.   If conservative measures fail, your physician may need to surgically repair the tendon by removing any chronic inflammatory tissue and sewing it back together. Sometimes it is sewn to an adjacent tendon with similar function for support and sometimes it is lengthened. . Sometimes the bones around the tendon need to be realigned or reshaped to better support the tendon or prevent further damage. Your foot and ankle surgeon will discuss the specifics of your surgery with you, should you need it.    Towel stretch: Sit on a hard surface with your injured leg stretched out in front of you. Loop a towel around your toes and the ball of your foot and pull the towel toward your body keeping your leg straight. Hold this position for 15 to 30 seconds and then relax. Repeat 3 times. Then push the towel away with the ball of your foot. Repeat 3 times.  When you don't feel much of a stretch using the towel, you can start the standing calf stretch and the following exercises.  Standing calf stretch: Stand facing a wall with your hands on the wall at about eye level. Keep your injured leg back with your heel on the floor. Keep the other leg forward with the knee bent. Turn your back foot slightly inward (as if you were pigeon-toed). Slowly lean into the wall until you feel a stretch in the back of your calf. Hold the  stretch for 15 to 30 seconds. Return to the starting position. Repeat 3 times. Do this exercise several times each day.   Standing soleus stretch: Stand facing a wall with your hands on the wall at about chest height. Keep your injured leg back with your heel on the floor. Keep the other leg forward with the knee bent. Turn your back foot slightly inward (as if you were pigeon-toed). Bend your back knee slightly and gently lean into the wall until you feel a stretch in the lower calf of your injured leg. Hold the stretch for 15 to 30 seconds. Return to the starting position. Repeat 3 times.   Achilles stretch: Stand with the ball of one foot on a stair. Reach for the step below with your heel until you feel a stretch in the arch of your foot. Hold this position for 15 to 30 seconds and then relax. Repeat 3 times.   Heel raise: Balance yourself while standing behind a chair or counter. Using the chair or counter as a support to help you, raise your body up onto your toes and hold for 5 seconds. Then slowly lower yourself down without holding onto the support. (It's OK to keep holding onto the support if you need to.) When this exercise becomes less painful, try lowering yourself down on the injured leg only. Repeat 15 times. Do 2 sets of 15. Rest 30 seconds between sets.   Step-up: Stand with the foot of your injured leg on a support 3 to 5 inches high (like a small step or block of wood). Keep your other foot flat on the floor. Shift your weight onto the injured leg on the support. Straighten your injured leg as the other leg comes off the floor. Return to the starting position by bending your injured leg and slowly lowering your uninjured leg back to the floor. Do 2 sets of 15.   Resisted ankle eversion: Sit with both legs stretched out in front of you, with your feet about a shoulder's width apart. Tie a loop in one end of elastic tubing. Put the foot of your injured leg through the loop so that the tubing goes  around the arch of that foot and wraps around the outside of the other foot. Hold onto the other end of the tubing with your hand to provide tension. Turn the foot of your injured leg up and out. Make sure you keep your other foot still so that it will allow the tubing to stretch as you move the foot of your injured leg. Return to the starting position. Do 2 sets of 15.   Balance and reach exercises: Stand next to a chair with your injured leg farther from the chair. The chair will provide support if you need it. Stand on the foot of your injured leg and bend your knee slightly. Try to raise the arch of this foot while keeping your big toe on the floor. Keep your foot in this position. With the hand that is farther away from the chair, reach forward in front of you by bending at the waist. Avoid bending your knee any more as you do this. Repeat this 10 times. To make the exercise more challenging, reach farther in front of you. Do 2 sets of 10.  the same position as above. While keeping your arch height, reach the hand that is farther away from the chair across your body toward the chair. The farther you reach, the more challenging the exercise. Do 2 sets of 10.   Resisted ankle eversion: Sit with both legs stretched out in front of you, with your feet about a shoulder's width apart. Tie a loop in one end of elastic tubing. Put the foot of your injured leg through the loop so that the tubing goes around the arch of that foot and wraps around the outside of the other foot. Hold onto the other end of the tubing with your hand to provide tension. Turn the foot of your injured leg up and out. Make sure you keep your other foot still so that it will allow the tubing to stretch as you move the foot of your injured leg. Return to the starting position. Do 2 sets of 15.   If you have access to a wobble board, do the following exercises:  Wobble board exercises:   Stand on a wobble board with your feet shoulder width  apart. Rock the board forwards and backwards 30 times, then side to side 30 times. Hold on to a chair if you need support.   Rotate the wobble board around so that the edge of the board is in contact with the floor at all times. Do this 30 times in a clockwise and then a counterclockwise direction.   Balance on the wobble board for as long as you can without letting the edges touch the floor. Try to do this for 2 minutes without touching the floor.   Rotate the wobble board in clockwise and counterclockwise circles, but do not let the edge of the board touch the floor.   When you have mastered exercises A through D, try repeating them while standing on just your injured leg.   After you are able to do these exercises on one leg, try to do them with your eyes closed. Make sure you have something nearby to support you in case you lose your balance.

## 2022-11-15 NOTE — PROGRESS NOTES
"Foot & Ankle Surgery  November 15, 2022    CC: \"Pain outer bottom left foot\"    I was asked to see Christine Dubose regarding the chief complaint by: Self    HPI:  Pt is a 52 year old female who presents with above complaint.  2-week history of lateral left ankle pain.  She describes \"aching\", \"hurts to walk and feels tight\".  Pain 4-10 \"daily\", worse with \"walking\".  \"Stretch and rest\" for treatment, as well as elevation.  She asks if the ankle looks swollen.  She denies recent or previous injury to the lateral left ankle.  No recent imaging    ROS:   Pos for CC.  The patient denies current nausea, vomiting, chills, fevers, belly pain, calf pain, chest pain or SOB.  Complete remainder of ROS is otherwise neg.    VITALS:    Vitals:    11/15/22 1110   BP: (!) 138/94   Weight: 95.3 kg (210 lb)       PMH:    Past Medical History:   Diagnosis Date     Arthritis      Chronic pain     CHRONIC NECK PAIN     Depression 2005    on Lexapro     Dysfunctional uterine bleeding      Heart murmur      Hypertension      Menorrhagia      Miscarriage 11/2001     Morbid obesity (H) 10/28/2022     MVP (mitral valve prolapse)     Needs antibiotics for procedures     Obese      Other chronic pain      Other forms of migraine, without mention of intractable migraine without mention of status migrainosus     takes Zomig     Ovarian cyst 03/31/2019    found in ER, L side     Seizures (H)     Had seizure in 2006. Was on Tegretol, but discontinued the meds on her own.Sees Dr. Gume Morgan at [facility].Was started on Depakote in 2008 for migrines in addition to Zomig     Stress incontinence, female 12/2009    Dr. Kurtz-Cystocoele repair and pubovag slling     Surgical menopause on hormone replacement therapy 12/2020     Thyroid disease     on synthroid-Hashimotos thyroiditis; sees Lorenza       SXHX:    Past Surgical History:   Procedure Laterality Date     ARTHROPLASTY KNEE UNICOMPARTMENT Right 04/02/2015    Procedure: " ARTHROPLASTY KNEE UNICOMPARTMENT;  Surgeon: Pablo Lara MD;  Location:  OR     ARTHROPLASTY KNEE UNICOMPARTMENT Left 10/27/2015    Procedure: ARTHROPLASTY KNEE UNICOMPARTMENT;  Surgeon: Pablo Lara MD;  Location:  OR      SECTION  1997    for breech     CYCTOCELE REPAIR      sling     CYSTOSCOPY  2014    Procedure: CYSTOSCOPY;;  Surgeon: Candie Crandall MD;  Location:  SD     DECOMPRESSION LUMBAR ONE LEVEL Right 2015    Procedure: DECOMPRESSION LUMBAR ONE LEVEL;  Surgeon: Elio Starr MD;  Location:  OR     DILATION AND CURETTAGE, OPERATIVE HYSTEROSCOPY, COMBINED  2012    Procedure:COMBINED DILATION AND CURETTAGE, OPERATIVE HYSTEROSCOPY; OPERATIVE HYSTEROSCOPY, DILATION AND CURETTAGE, POSSIBLE RESECTION OF POLYP; Surgeon:CANDIE CRANDALL; Location:[facility]     IR CERVICAL EPIDURAL STEROID INJECTION  2012     LAPAROSCOPIC HYSTERECTOMY SUPRACERVICAL  2014    Procedure: LAPAROSCOPIC HYSTERECTOMY SUPRACERVICAL;  LAPAROSCOPIC ASSISTED SUPRACERVICAL HYSTERECTOMY, cystoscopy;  Surgeon: Candie Crandall MD;  Location: [facility]     LAPAROSCOPIC OOPHORECTOMY Bilateral 2020    Procedure: LAPAROSCOPIC BILATERAL OOPHORECTOMY;  Surgeon: Christiana Andersen MD;  Location:  OR     STRIP VEIN  2010     TONSILLECTOMY  1974        MEDS:    Current Outpatient Medications   Medication     Calcium-Magnesium-Zinc 333-133-5 MG TABS     escitalopram (LEXAPRO) 20 MG tablet     estradiol (ESTRACE) 0.5 MG tablet     IRON-FOLIC ACID PO     KRILL OIL PO     levothyroxine (SYNTHROID/LEVOTHROID) 137 MCG tablet     lisinopril (ZESTRIL) 10 MG tablet     meloxicam (MOBIC) 15 MG tablet     olopatadine (PATANOL) 0.1 % ophthalmic solution     Pediatric Multivit-Minerals-C (MULTIVITAMIN GUMMIES CHILDRENS PO)     tolterodine ER (DETROL LA) 2 MG 24 hr capsule     No current facility-administered medications for this visit.       ALL:      Allergies   Allergen Reactions     Imitrex [Sumatriptan] Other (See Comments)     Throat tightness       FMH:    Family History   Problem Relation Age of Onset     Hypertension Mother      Hyperlipidemia Mother         high triglycerides     Thyroid Disease Mother      Hypertension Father      Hyperlipidemia Father         not on meds     No Known Problems Son      No Known Problems Son      No Known Problems Son      Hypothyroidism Daughter      Diabetes Maternal Grandmother      Fractures Maternal Grandmother         Hip     Hypertension Maternal Grandmother      Osteoporosis Maternal Grandmother      Diabetes Maternal Grandfather      Lung Cancer Maternal Grandfather 68        smoker     Diabetes Paternal Grandmother      Hypertension Paternal Grandmother      Cerebrovascular Disease Paternal Grandmother         TIA     Diabetes Paternal Grandfather      Stomach Cancer Paternal Grandfather 68     Coronary Artery Disease No family hx of      Breast Cancer No family hx of      Colon Cancer No family hx of        SocHx:    Social History     Socioeconomic History     Marital status:      Spouse name: Evan     Number of children: 4     Years of education: Not on file     Highest education level: Not on file   Occupational History     Employer: [employer]     Occupation:      Comment:  Office   Tobacco Use     Smoking status: Never     Smokeless tobacco: Never   Substance and Sexual Activity     Alcohol use: Yes     Alcohol/week: 0.0 standard drinks     Comment: Socially     Drug use: No     Sexual activity: Yes     Partners: Male     Birth control/protection: Female Surgical     Comment: Hysterectomy - still has cervix   Other Topics Concern     Parent/sibling w/ CABG, MI or angioplasty before 65F 55M? No      Service No     Blood Transfusions No     Caffeine Concern No     Comment: occ     Occupational Exposure Not Asked     Hobby Hazards Not Asked     Sleep Concern No      Stress Concern Not Asked     Weight Concern Not Asked     Special Diet Not Asked     Back Care Not Asked     Exercise No     Comment: housework - rec 30-45 minutes 3-4 times per week     Bike Helmet Not Asked     Seat Belt Yes     Self-Exams Not Asked   Social History Narrative     Not on file     Social Determinants of Health     Financial Resource Strain: Not on file   Food Insecurity: Not on file   Transportation Needs: Not on file   Physical Activity: Not on file   Stress: Not on file   Social Connections: Not on file   Intimate Partner Violence: Not on file   Housing Stability: Not on file           EXAMINATION:  Gen:   No apparent distress  Neuro:   A&Ox3, no deficits  Psych:    Answering questions appropriately for age and situation with normal affect  Head:    NCAT  Eye:    Visual scanning without deficit  Ear:    Response to auditory stimuli wnl  Lung:    Non-labored breathing on RA noted  Abd:    NTND per patient report  Lymph:    Patient has swelling along the course of the peroneal tendons lateral left ankle compared to the lateral right ankle  Vasc:    Pulses palpable, CFT minimally delayed  Neuro:    Light touch sensation intact to all sensory nerve distributions without paresthesias  Derm:    Neg for nodules, lesions or ulcerations  MSK:    She has significant tenderness on palpation of the peroneal tendons, specifically from the lateral malleolus to the fifth metatarsal base.  She does have pain with resisted first ray plantarflexion and resisted eversion of the foot.  No sinus tarsi or ATFL pain seen.  Calf:    Neg for redness, swelling or tenderness      Imaging: 3 views weightbearing left ankle -swelling noted at the lateral ankle.  Posterior and plantar calcaneal spurs.  No acute pathology seen.      Assessment:  52 year old female with pronounced pain and swelling along the course of the left peroneal tendons      Plan:  Discussed etiologies, anatomy and options  1.  Pronounced pain and swelling  along the course of the left peroneal tendons  -I personally reviewed and interpreted the patient's lower extremity history pertinent to today's visit, including imaging/labs, in preparation for initiating a treatment program.  -Our tendinitis handout was dispensed  -Baseline therapies that were discussed included comfortable shoes/minimizing shoeless walking, OTC insert for arch support and rear foot motion control and RICE/NSAID versus Tylenol based on pain  -Intermediate therapies that were discussed included immobilization (Tri-Lock ankle brace versus cam walker) and physical therapy  -The brace was dispensed today   -I personally reviewed and interpreted her x-ray results.  -Based on preop symptoms and a tract of swelling along the course of the tendons, an MRI was ordered for further assessment.  I will reach out to her with the results.   -Consider physical therapy for musculoskeletal functional rehab if the MRI does not demonstrate significant pathology  -She was given a prescription for Voltaren gel    Follow up: Based on imaging or sooner with acute issues      Patient's medical history was reviewed today      Nick Beebe DPM FACFAS FACFAOM  Podiatric Foot & Ankle Surgeon  [facility]  [phone]    Disclaimer: This note consists of symbols derived from keyboarding, dictation and/or voice recognition software. As a result, there may be errors in the script that have gone undetected. Please consider this when interpreting information found in this chart.

## 2022-11-15 NOTE — LETTER
"    11/15/2022         RE: Christine Dubose  [address] [facility] [address]        Dear Colleague,    Thank you for referring your patient, Christine Dubose, to the [facility]OD[facility]. Please see a copy of my visit note below.    Foot & Ankle Surgery  November 15, 2022    CC: \"Pain outer bottom left foot\"    I was asked to see Christine Dubose regarding the chief complaint by: Self    HPI:  Pt is a 52 year old female who presents with above complaint.  2-week history of lateral left ankle pain.  She describes \"aching\", \"hurts to walk and feels tight\".  Pain 4-10 \"daily\", worse with \"walking\".  \"Stretch and rest\" for treatment, as well as elevation.  She asks if the ankle looks swollen.  She denies recent or previous injury to the lateral left ankle.  No recent imaging    ROS:   Pos for CC.  The patient denies current nausea, vomiting, chills, fevers, belly pain, calf pain, chest pain or SOB.  Complete remainder of ROS is otherwise neg.    VITALS:    Vitals:    11/15/22 1110   BP: (!) 138/94   Weight: 95.3 kg (210 lb)       PMH:    Past Medical History:   Diagnosis Date     Arthritis      Chronic pain     CHRONIC NECK PAIN     Depression 2005    on Lexapro     Dysfunctional uterine bleeding      Heart murmur      Hypertension      Menorrhagia      Miscarriage 11/2001     Morbid obesity (H) 10/28/2022     MVP (mitral valve prolapse)     Needs antibiotics for procedures     Obese      Other chronic pain      Other forms of migraine, without mention of intractable migraine without mention of status migrainosus     takes Zomig     Ovarian cyst 03/31/2019    found in ER, L side     Seizures (H)     Had seizure in 2006. Was on Tegretol, but discontinued the meds on her own.Sees Dr. Gume Morgan at [facility].Was started on Depakote in 2008 for migrines in addition to Zomig     Stress incontinence, female 12/2009    Dr. Kurtz-Cystocoele repair and pubovag slling     " Surgical menopause on hormone replacement therapy 2020     Thyroid disease     on synthroid-Hashimotos thyroiditis; sees Lorenza       SXHX:    Past Surgical History:   Procedure Laterality Date     ARTHROPLASTY KNEE UNICOMPARTMENT Right 2015    Procedure: ARTHROPLASTY KNEE UNICOMPARTMENT;  Surgeon: Pablo Lara MD;  Location:  OR     ARTHROPLASTY KNEE UNICOMPARTMENT Left 10/27/2015    Procedure: ARTHROPLASTY KNEE UNICOMPARTMENT;  Surgeon: Pablo Lara MD;  Location:  OR      SECTION  1997    for breech     CYCTOCELE REPAIR  2010    sling     CYSTOSCOPY  2014    Procedure: CYSTOSCOPY;;  Surgeon: Candie Crandall MD;  Location:  SD     DECOMPRESSION LUMBAR ONE LEVEL Right 2015    Procedure: DECOMPRESSION LUMBAR ONE LEVEL;  Surgeon: Elio Starr MD;  Location:  OR     DILATION AND CURETTAGE, OPERATIVE HYSTEROSCOPY, COMBINED  2012    Procedure:COMBINED DILATION AND CURETTAGE, OPERATIVE HYSTEROSCOPY; OPERATIVE HYSTEROSCOPY, DILATION AND CURETTAGE, POSSIBLE RESECTION OF POLYP; Surgeon:CANDIE CRADNALL; Location: [facility]     IR CERVICAL EPIDURAL STEROID INJECTION  2012     LAPAROSCOPIC HYSTERECTOMY SUPRACERVICAL  2014    Procedure: LAPAROSCOPIC HYSTERECTOMY SUPRACERVICAL;  LAPAROSCOPIC ASSISTED SUPRACERVICAL HYSTERECTOMY, cystoscopy;  Surgeon: Candie Crandall MD;  Location:  SD     LAPAROSCOPIC OOPHORECTOMY Bilateral 2020    Procedure: LAPAROSCOPIC BILATERAL OOPHORECTOMY;  Surgeon: Christiana Andersen MD;  Location:  OR     STRIP VEIN  2010     TONSILLECTOMY  1974        MEDS:    Current Outpatient Medications   Medication     Calcium-Magnesium-Zinc 333-133-5 MG TABS     escitalopram (LEXAPRO) 20 MG tablet     estradiol (ESTRACE) 0.5 MG tablet     IRON-FOLIC ACID PO     KRILL OIL PO     levothyroxine (SYNTHROID/LEVOTHROID) 137 MCG tablet     lisinopril (ZESTRIL) 10 MG tablet     meloxicam (MOBIC) 15 MG  tablet     olopatadine (PATANOL) 0.1 % ophthalmic solution     Pediatric Multivit-Minerals-C (MULTIVITAMIN GUMMIES CHILDRENS PO)     tolterodine ER (DETROL LA) 2 MG 24 hr capsule     No current facility-administered medications for this visit.       ALL:     Allergies   Allergen Reactions     Imitrex [Sumatriptan] Other (See Comments)     Throat tightness       FMH:    Family History   Problem Relation Age of Onset     Hypertension Mother      Hyperlipidemia Mother         high triglycerides     Thyroid Disease Mother      Hypertension Father      Hyperlipidemia Father         not on meds     No Known Problems Son      No Known Problems Son      No Known Problems Son      Hypothyroidism Daughter      Diabetes Maternal Grandmother      Fractures Maternal Grandmother         Hip     Hypertension Maternal Grandmother      Osteoporosis Maternal Grandmother      Diabetes Maternal Grandfather      Lung Cancer Maternal Grandfather 68        smoker     Diabetes Paternal Grandmother      Hypertension Paternal Grandmother      Cerebrovascular Disease Paternal Grandmother         TIA     Diabetes Paternal Grandfather      Stomach Cancer Paternal Grandfather 68     Coronary Artery Disease No family hx of      Breast Cancer No family hx of      Colon Cancer No family hx of        SocHx:    Social History     Socioeconomic History     Marital status:      Spouse name: Evan     Number of children: 4     Years of education: Not on file     Highest education level: Not on file   Occupational History     Employer: [employer]OMEM[employer]     Occupation:      Comment:  Office   Tobacco Use     Smoking status: Never     Smokeless tobacco: Never   Substance and Sexual Activity     Alcohol use: Yes     Alcohol/week: 0.0 standard drinks     Comment: Socially     Drug use: No     Sexual activity: Yes     Partners: Male     Birth control/protection: Female Surgical     Comment: Hysterectomy - still has cervix   Other  Topics Concern     Parent/sibling w/ CABG, MI or angioplasty before 65F 55M? No      Service No     Blood Transfusions No     Caffeine Concern No     Comment: occ     Occupational Exposure Not Asked     Hobby Hazards Not Asked     Sleep Concern No     Stress Concern Not Asked     Weight Concern Not Asked     Special Diet Not Asked     Back Care Not Asked     Exercise No     Comment: housework - rec 30-45 minutes 3-4 times per week     Bike Helmet Not Asked     Seat Belt Yes     Self-Exams Not Asked   Social History Narrative     Not on file     Social Determinants of Health     Financial Resource Strain: Not on file   Food Insecurity: Not on file   Transportation Needs: Not on file   Physical Activity: Not on file   Stress: Not on file   Social Connections: Not on file   Intimate Partner Violence: Not on file   Housing Stability: Not on file           EXAMINATION:  Gen:   No apparent distress  Neuro:   A&Ox3, no deficits  Psych:    Answering questions appropriately for age and situation with normal affect  Head:    NCAT  Eye:    Visual scanning without deficit  Ear:    Response to auditory stimuli wnl  Lung:    Non-labored breathing on RA noted  Abd:    NTND per patient report  Lymph:    Patient has swelling along the course of the peroneal tendons lateral left ankle compared to the lateral right ankle  Vasc:    Pulses palpable, CFT minimally delayed  Neuro:    Light touch sensation intact to all sensory nerve distributions without paresthesias  Derm:    Neg for nodules, lesions or ulcerations  MSK:    She has significant tenderness on palpation of the peroneal tendons, specifically from the lateral malleolus to the fifth metatarsal base.  She does have pain with resisted first ray plantarflexion and resisted eversion of the foot.  No sinus tarsi or ATFL pain seen.  Calf:    Neg for redness, swelling or tenderness      Imaging: 3 views weightbearing left ankle -swelling noted at the lateral ankle.  Posterior  and plantar calcaneal spurs.  No acute pathology seen.      Assessment:  52 year old female with pronounced pain and swelling along the course of the left peroneal tendons      Plan:  Discussed etiologies, anatomy and options  1.  Pronounced pain and swelling along the course of the left peroneal tendons  -I personally reviewed and interpreted the patient's lower extremity history pertinent to today's visit, including imaging/labs, in preparation for initiating a treatment program.  -Our tendinitis handout was dispensed  -Baseline therapies that were discussed included comfortable shoes/minimizing shoeless walking, OTC insert for arch support and rear foot motion control and RICE/NSAID versus Tylenol based on pain  -Intermediate therapies that were discussed included immobilization (Tri-Lock ankle brace versus cam walker) and physical therapy  -The brace was dispensed today   -I personally reviewed and interpreted her x-ray results.  -Based on preop symptoms and a tract of swelling along the course of the tendons, an MRI was ordered for further assessment.  I will reach out to her with the results.   -Consider physical therapy for musculoskeletal functional rehab if the MRI does not demonstrate significant pathology  -She was given a prescription for Voltaren gel    Follow up: Based on imaging or sooner with acute issues      Patient's medical history was reviewed today      Nick Beebe DPM FACFAS FACFAOM  Podiatric Foot & Ankle Surgeon  [facility]  [phone]    Disclaimer: This note consists of symbols derived from keyboarding, dictation and/or voice recognition software. As a result, there may be errors in the script that have gone undetected. Please consider this when interpreting information found in this chart.            Again, thank you for allowing me to participate in the care of your patient.        Sincerely,        Nick Beebe DPM, CHRISTINE

## 2022-12-01 ENCOUNTER — HOSPITAL ENCOUNTER (OUTPATIENT)
Dept: MRI IMAGING | Facility: CLINIC | Age: 52
Discharge: HOME | End: 2022-12-01
Attending: PODIATRIST | Admitting: PODIATRIST
Payer: COMMERCIAL

## 2022-12-01 DIAGNOSIS — M76.72 PERONEAL TENDONITIS, LEFT: ICD-10-CM

## 2022-12-01 PROCEDURE — 73721 MRI JNT OF LWR EXTRE W/O DYE: CPT | Mod: LT

## 2022-12-01 PROCEDURE — 73721 MRI JNT OF LWR EXTRE W/O DYE: CPT | Mod: 26 | Performed by: RADIOLOGY

## 2022-12-02 ENCOUNTER — TELEPHONE (OUTPATIENT)
Dept: PODIATRY | Facility: CLINIC | Age: 52
End: 2022-12-02

## 2022-12-02 ENCOUNTER — MYC MEDICAL ADVICE (OUTPATIENT)
Dept: PODIATRY | Facility: CLINIC | Age: 52
End: 2022-12-02

## 2022-12-02 NOTE — TELEPHONE ENCOUNTER
Patient would like to move forward with surgery. Please place surgery order.     Tom Torres, Surgery Scheduler

## 2022-12-05 ENCOUNTER — PREP FOR PROCEDURE (OUTPATIENT)
Dept: PODIATRY | Facility: CLINIC | Age: 52
End: 2022-12-05

## 2022-12-05 DIAGNOSIS — M76.72 PERONEAL TENDONITIS, LEFT: Primary | ICD-10-CM

## 2022-12-05 NOTE — TELEPHONE ENCOUNTER
Routing to [facility] Surgery scheduler to advise of possible surgery date availability for Dr. Beebe.     Kailey Velazquez, ATC

## 2022-12-05 NOTE — PROGRESS NOTES
"Order signed for \"left peroneal tendon repair\"    General with popliteal block    Lateral left side    Nick Beebe DPM FACFAS FACFAOM  Podiatric Foot & Ankle Surgeon  [facility]  [phone]      "

## 2022-12-06 ENCOUNTER — OFFICE VISIT (OUTPATIENT)
Dept: PODIATRY | Facility: CLINIC | Age: 52
End: 2022-12-06
Payer: COMMERCIAL

## 2022-12-06 VITALS — SYSTOLIC BLOOD PRESSURE: 132 MMHG | BODY MASS INDEX: 34.41 KG/M2 | DIASTOLIC BLOOD PRESSURE: 92 MMHG | WEIGHT: 210 LBS

## 2022-12-06 DIAGNOSIS — M76.72 PERONEAL TENDONITIS, LEFT: Primary | ICD-10-CM

## 2022-12-06 PROCEDURE — 99214 OFFICE O/P EST MOD 30 MIN: CPT | Performed by: PODIATRIST

## 2022-12-06 NOTE — TELEPHONE ENCOUNTER
Left vmail for patient to return call regarding surgery options.     Closing encounter.  See surgery scheduling encounter.       Tom Torres, Surgery Scheduler

## 2022-12-06 NOTE — H&P (VIEW-ONLY)
Foot & Ankle Surgery   December 6, 2022    S:  Pt is seen today for surgical consult for left peroneal tendinitis.  Conservative therapies that have been attempted without sufficient relief include home therapies home therapies.  Because of insufficient relief, the patient wishes to forego further non-operative cares in favor of surgical intervention.  No guarantees have been given to the outcome.  We previously reviewed the MRI results.  We also had discussed the option for physical therapy and immobilization.  She is in today to discuss a surgical plan for information and planning    Vitals:    12/06/22 1111   BP: (!) 132/92   Weight: 95.3 kg (210 lb)     Body mass index is 34.41 kg/m .    ROS - Pos for CC.  Patient denies current nausea, vomiting, chills, fevers, belly pain, calf pain, chest pain or SOB.  Complete remainder of ROS is otherwise neg.      PE:  Lymph:    Patient has swelling along the course of the peroneal tendons lateral left ankle compared to the lateral right ankle  Vasc:    Pulses palpable, CFT minimally delayed  Neuro:    Light touch sensation intact to all sensory nerve distributions without paresthesias  Derm:    Neg for nodules, lesions or ulcerations  MSK:    She has significant tenderness on palpation of the peroneal tendons, specifically from the lateral malleolus to the fifth metatarsal base.  She does have pain with resisted first ray plantarflexion and resisted eversion of the foot.  No sinus tarsi or ATFL pain seen.  Calf:    Neg for redness, swelling or tenderness    Imaging: MRI left ankle 12/1/2022 - Impression:     1. High-grade tenosynovitis of both the peroneus longus and brevis  tendons. Mild tendinopathy of the peroneus longus and subtle split  tear of the peroneus brevis tendon just distal to the tip of the  fibula, which reconstitutes itself more distally. The peroneal  retinaculum is distended with fluid, however, appears grossly intact.  2. Calcaneal spur, mild edema  "within the plantar fascia and the  subcutaneous fat plane at the plantar aspect of the foot, deep to the  calcaneal spur.  3. Very mild tenosynovitis of the posterior tibialis tendon.      Assessment:  52 year old female peroneal tenosynovitis with tendon tear of the peroneus brevis    Plan:  The surgical procedure(s) was discussed in detail today.  Risks that were discussed include, but are not limited to, infection, wound healing complications, temporary or permanent nerve damage/numbness, painful scarring, possible recurrence of/new deformity, over-correction, under-correction, possible abnormal bone healing, fixation/hardware failure, continued or new pain, the need to revise the procedure, as well as blood clots, limb loss, pain syndrome and death.  After a discussion of the procedure, risks, and post-operative care and course, the patient has elected to forego further non-operative management in favor of surgical intervention.  No guarantees were given.  The patient was informed that swelling and generalized discomfort can persist for months, and full recovery can often take up to a year.  I anticipate discontinuation of prescription pain medication at/shortly after suture removal.    Diagnosis: Above  Procedure: Peroneal tendon debridement and repair  Activity Level: Nonweightbearing 6 weeks  Pain Management: Norco, Atarax, ibuprofen  DVT prophylaxis: Xarelto 10 mg daily x2-week starting postop day 1.  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      Nick Beebe DPM [facility] FACFAOM  Podiatric Foot & Ankle Surgeon  [facility]  [phone]    Disclaimer: This note consists of symbols derived from keyboarding, dictation and/or voice recognition software. As a result, there may be errors in the script that have gone undetected. Please consider this when interpreting information found in this chart.

## 2022-12-06 NOTE — LETTER
12/6/2022         RE: Christine Dubose  [address] [facility] [address]        Dear Colleague,    Thank you for referring your patient, Christine Dubose, to the [facility] PODI[facility]. Please see a copy of my visit note below.    Foot & Ankle Surgery   December 6, 2022    S:  Pt is seen today for surgical consult for left peroneal tendinitis.  Conservative therapies that have been attempted without sufficient relief include home therapies home therapies.  Because of insufficient relief, the patient wishes to forego further non-operative cares in favor of surgical intervention.  No guarantees have been given to the outcome.  We previously reviewed the MRI results.  We also had discussed the option for physical therapy and immobilization.  She is in today to discuss a surgical plan for information and planning    Vitals:    12/06/22 1111   BP: (!) 132/92   Weight: 95.3 kg (210 lb)     Body mass index is 34.41 kg/m .    ROS - Pos for CC.  Patient denies current nausea, vomiting, chills, fevers, belly pain, calf pain, chest pain or SOB.  Complete remainder of ROS is otherwise neg.      PE:  Lymph:    Patient has swelling along the course of the peroneal tendons lateral left ankle compared to the lateral right ankle  Vasc:    Pulses palpable, CFT minimally delayed  Neuro:    Light touch sensation intact to all sensory nerve distributions without paresthesias  Derm:    Neg for nodules, lesions or ulcerations  MSK:    She has significant tenderness on palpation of the peroneal tendons, specifically from the lateral malleolus to the fifth metatarsal base.  She does have pain with resisted first ray plantarflexion and resisted eversion of the foot.  No sinus tarsi or ATFL pain seen.  Calf:    Neg for redness, swelling or tenderness    Imaging: MRI left ankle 12/1/2022 - Impression:     1. High-grade tenosynovitis of both the peroneus longus and brevis  tendons. Mild tendinopathy of the  peroneus longus and subtle split  tear of the peroneus brevis tendon just distal to the tip of the  fibula, which reconstitutes itself more distally. The peroneal  retinaculum is distended with fluid, however, appears grossly intact.  2. Calcaneal spur, mild edema within the plantar fascia and the  subcutaneous fat plane at the plantar aspect of the foot, deep to the  calcaneal spur.  3. Very mild tenosynovitis of the posterior tibialis tendon.      Assessment:  52 year old female peroneal tenosynovitis with tendon tear of the peroneus brevis    Plan:  The surgical procedure(s) was discussed in detail today.  Risks that were discussed include, but are not limited to, infection, wound healing complications, temporary or permanent nerve damage/numbness, painful scarring, possible recurrence of/new deformity, over-correction, under-correction, possible abnormal bone healing, fixation/hardware failure, continued or new pain, the need to revise the procedure, as well as blood clots, limb loss, pain syndrome and death.  After a discussion of the procedure, risks, and post-operative care and course, the patient has elected to forego further non-operative management in favor of surgical intervention.  No guarantees were given.  The patient was informed that swelling and generalized discomfort can persist for months, and full recovery can often take up to a year.  I anticipate discontinuation of prescription pain medication at/shortly after suture removal.    Diagnosis: Above  Procedure: Peroneal tendon debridement and repair  Activity Level: Nonweightbearing 6 weeks  Pain Management: Norco, Atarax, ibuprofen  DVT prophylaxis: Xarelto 10 mg daily x2-week starting postop day 1.  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      Nick Beebe DPM [facility] FACFA  Podiatric Foot & Ankle Surgeon  [facility]  [phone]    Disclaimer: This note consists of symbols derived from keyboarding, dictation and/or voice recognition software. As a result, there may be errors in the script that have gone undetected. Please consider this when interpreting information found in this chart.        Again, thank you for allowing me to participate in the care of your patient.        Sincerely,        Nick Beebe DPM, DPM    "

## 2022-12-06 NOTE — PROGRESS NOTES
Foot & Ankle Surgery   December 6, 2022    S:  Pt is seen today for surgical consult for left peroneal tendinitis.  Conservative therapies that have been attempted without sufficient relief include home therapies home therapies.  Because of insufficient relief, the patient wishes to forego further non-operative cares in favor of surgical intervention.  No guarantees have been given to the outcome.  We previously reviewed the MRI results.  We also had discussed the option for physical therapy and immobilization.  She is in today to discuss a surgical plan for information and planning    Vitals:    12/06/22 1111   BP: (!) 132/92   Weight: 95.3 kg (210 lb)     Body mass index is 34.41 kg/m .    ROS - Pos for CC.  Patient denies current nausea, vomiting, chills, fevers, belly pain, calf pain, chest pain or SOB.  Complete remainder of ROS is otherwise neg.      PE:  Lymph:    Patient has swelling along the course of the peroneal tendons lateral left ankle compared to the lateral right ankle  Vasc:    Pulses palpable, CFT minimally delayed  Neuro:    Light touch sensation intact to all sensory nerve distributions without paresthesias  Derm:    Neg for nodules, lesions or ulcerations  MSK:    She has significant tenderness on palpation of the peroneal tendons, specifically from the lateral malleolus to the fifth metatarsal base.  She does have pain with resisted first ray plantarflexion and resisted eversion of the foot.  No sinus tarsi or ATFL pain seen.  Calf:    Neg for redness, swelling or tenderness    Imaging: MRI left ankle 12/1/2022 - Impression:     1. High-grade tenosynovitis of both the peroneus longus and brevis  tendons. Mild tendinopathy of the peroneus longus and subtle split  tear of the peroneus brevis tendon just distal to the tip of the  fibula, which reconstitutes itself more distally. The peroneal  retinaculum is distended with fluid, however, appears grossly intact.  2. Calcaneal spur, mild edema  "within the plantar fascia and the  subcutaneous fat plane at the plantar aspect of the foot, deep to the  calcaneal spur.  3. Very mild tenosynovitis of the posterior tibialis tendon.      Assessment:  52 year old female peroneal tenosynovitis with tendon tear of the peroneus brevis    Plan:  The surgical procedure(s) was discussed in detail today.  Risks that were discussed include, but are not limited to, infection, wound healing complications, temporary or permanent nerve damage/numbness, painful scarring, possible recurrence of/new deformity, over-correction, under-correction, possible abnormal bone healing, fixation/hardware failure, continued or new pain, the need to revise the procedure, as well as blood clots, limb loss, pain syndrome and death.  After a discussion of the procedure, risks, and post-operative care and course, the patient has elected to forego further non-operative management in favor of surgical intervention.  No guarantees were given.  The patient was informed that swelling and generalized discomfort can persist for months, and full recovery can often take up to a year.  I anticipate discontinuation of prescription pain medication at/shortly after suture removal.    Diagnosis: Above  Procedure: Peroneal tendon debridement and repair  Activity Level: Nonweightbearing 6 weeks  Pain Management: Norco, Atarax, ibuprofen  DVT prophylaxis: Xarelto 10 mg daily x2-week starting postop day 1.  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      Nick Beebe DPM [facility] FACFAOM  Podiatric Foot & Ankle Surgeon  [facility]  [phone]    Disclaimer: This note consists of symbols derived from keyboarding, dictation and/or voice recognition software. As a result, there may be errors in the script that have gone undetected. Please consider this when interpreting information found in this chart.

## 2022-12-06 NOTE — TELEPHONE ENCOUNTER
Scheduled surgery    Type of surgery: peroneal tendon repair  [facility]  Date and time of surgery: 12/28/22  Surgeon: Concepción  Pre-Op Appt Date: patient to schedule  Post-Op Appt Date: 1/5/23   Packet sent out: Yes  Pre-cert/Authorization completed:  No  Date: 12.6.22      Tom Torres, Surgery Scheduler

## 2022-12-07 DIAGNOSIS — F41.9 ANXIETY: ICD-10-CM

## 2022-12-07 DIAGNOSIS — M19.041 ARTHRITIS OF RIGHT HAND: ICD-10-CM

## 2022-12-07 DIAGNOSIS — E03.9 HYPOTHYROIDISM, UNSPECIFIED TYPE: ICD-10-CM

## 2022-12-07 RX ORDER — ESCITALOPRAM OXALATE 20 MG/1
TABLET ORAL
Qty: 90 TABLET | Refills: 0 | Status: SHIPPED | OUTPATIENT
Start: 2022-12-07 | End: 2023-03-10

## 2022-12-07 RX ORDER — LEVOTHYROXINE SODIUM 137 UG/1
TABLET ORAL
Qty: 90 TABLET | Refills: 3 | Status: SHIPPED | OUTPATIENT
Start: 2022-12-07 | End: 2023-12-01

## 2022-12-08 RX ORDER — MELOXICAM 15 MG/1
TABLET ORAL
Qty: 90 TABLET | Refills: 1 | Status: ON HOLD | OUTPATIENT
Start: 2022-12-08 | End: 2022-12-28

## 2022-12-08 NOTE — TELEPHONE ENCOUNTER
Ok to fill levothyroxine and lexapro     Please advise on the mobic as it did not pass protocol     Thank you     Ciera Ahumada RN, BSN  [facility]

## 2022-12-08 NOTE — TELEPHONE ENCOUNTER
Please advise     Thank you     Ciera Ahumada RN, BSN  [facility]

## 2022-12-13 ENCOUNTER — OFFICE VISIT (OUTPATIENT)
Dept: FAMILY MEDICINE | Facility: CLINIC | Age: 52
End: 2022-12-13
Payer: COMMERCIAL

## 2022-12-13 VITALS
SYSTOLIC BLOOD PRESSURE: 120 MMHG | DIASTOLIC BLOOD PRESSURE: 80 MMHG | RESPIRATION RATE: 16 BRPM | WEIGHT: 210 LBS | TEMPERATURE: 98 F | OXYGEN SATURATION: 100 % | HEIGHT: 66 IN | HEART RATE: 88 BPM | BODY MASS INDEX: 33.75 KG/M2

## 2022-12-13 DIAGNOSIS — N39.41 URGENCY INCONTINENCE: ICD-10-CM

## 2022-12-13 DIAGNOSIS — B07.9 VIRAL WARTS, UNSPECIFIED TYPE: ICD-10-CM

## 2022-12-13 DIAGNOSIS — I10 HYPERTENSION GOAL BP (BLOOD PRESSURE) < 130/80: ICD-10-CM

## 2022-12-13 DIAGNOSIS — Z01.818 PREOP GENERAL PHYSICAL EXAM: Primary | ICD-10-CM

## 2022-12-13 DIAGNOSIS — S86.302D INJURY OF PERONEAL TENDON OF LEFT FOOT, SUBSEQUENT ENCOUNTER: ICD-10-CM

## 2022-12-13 DIAGNOSIS — E03.9 HYPOTHYROIDISM, UNSPECIFIED TYPE: ICD-10-CM

## 2022-12-13 PROCEDURE — 36415 COLL VENOUS BLD VENIPUNCTURE: CPT | Performed by: FAMILY MEDICINE

## 2022-12-13 PROCEDURE — 84443 ASSAY THYROID STIM HORMONE: CPT | Performed by: FAMILY MEDICINE

## 2022-12-13 PROCEDURE — 99214 OFFICE O/P EST MOD 30 MIN: CPT | Mod: 25 | Performed by: FAMILY MEDICINE

## 2022-12-13 PROCEDURE — 11900 INJECT SKIN LESIONS </W 7: CPT | Performed by: FAMILY MEDICINE

## 2022-12-13 PROCEDURE — 82043 UR ALBUMIN QUANTITATIVE: CPT | Performed by: FAMILY MEDICINE

## 2022-12-13 RX ORDER — CANDIDA ALBICANS 1000 [PNU]/ML
0.5 INJECTION, SOLUTION INTRADERMAL ONCE
Status: COMPLETED | OUTPATIENT
Start: 2022-12-13 | End: 2022-12-13

## 2022-12-13 RX ADMIN — CANDIDA ALBICANS 0.5 ML: 1000 INJECTION, SOLUTION INTRADERMAL at 14:37

## 2022-12-13 NOTE — H&P (VIEW-ONLY)
[address] [address]  Phone: [phone]  Primary Provider: Prudencio Wynne  Pre-op Performing Provider: PRUDENCIO WYNNE    PREOPERATIVE EVALUATION:  Today's date: 12/13/2022    Christine Dubose is a 52 year old female who presents for a preoperative evaluation.    Surgical Information:  Surgery/Procedure: LEFT PERONEAL TENDON REPAIR  Surgery Location:  OR  Surgeon: Nick Beebe DPM  Surgery Date: 12/28/22  Time of Surgery: 10:35 am  Where patient plans to recover: At home with family  Fax number for surgical facility: [place] [facility]    Type of Anesthesia Anticipated: General and Casper Block    Assessment & Plan     The proposed surgical procedure is considered INTERMEDIATE risk.    Preop general physical exam    Injury of peroneal tendon of left foot, subsequent encounter    Hypertension goal BP (blood pressure) < 130/80  Controlled - continue medication.     Hypothyroidism, unspecified type  Controlled - continue medication.   - TSH WITH FREE T4 REFLEX    Wart on right thumb - candida inject done    Procedure:  Candida antigen injection (wart) - risks and side effects discussed (pruritis, redness, infection) - consent obtained - 1:1 lidocaine 1% and candida antigen -  0.1 ml injected intradermally into 1 lesion(s) with 30 gauge needle.  The procedure was well tolerated. She can return in 3-4 weeks if needed (up to 4 injection visits total if needed- max injection volume per visit is 1 ml)    Risks and Recommendations:  The patient has the following additional risks and recommendations for perioperative complications:   - No identified additional risk factors other than previously addressed    Medication Instructions:  Patient is to take all scheduled medications on the day of surgery EXCEPT for modifications listed below:   - meloxicam (Mobic): HOLD 10 days before surgery.     RECOMMENDATION:  APPROVAL GIVEN to proceed with proposed  procedure, without further diagnostic evaluation.      Sonny Conde MD     [facility]  [address] [place] [id number]  Office: [phone]  [email or web address]         Subjective     HPI related to upcoming procedure: Left peroneal tendon symptoms        Preop Questions 12/13/2022   1. Have you ever had a heart attack or stroke? No   2. Have you ever had surgery on your heart or blood vessels, such as a stent placement, a coronary artery bypass, or surgery on an artery in your head, neck, heart, or legs? No   3. Do you have chest pain with activity? No   4. Do you have a history of  heart failure? No   5. Do you currently have a cold, bronchitis or symptoms of other infection? No   6. Do you have a cough, shortness of breath, or wheezing? No   7. Do you or anyone in your family have previous history of blood clots? No   8. Do you or does anyone in your family have a serious bleeding problem such as prolonged bleeding following surgeries or cuts? No   9. Have you ever had problems with anemia or been told to take iron pills? No   10. Have you had any abnormal blood loss such as black, tarry or bloody stools, or abnormal vaginal bleeding? No   11. Have you ever had a blood transfusion? No   12. Are you willing to have a blood transfusion if it is medically needed before, during, or after your surgery? Yes   13. Have you or any of your relatives ever had problems with anesthesia? No   14. Do you have sleep apnea, excessive snoring or daytime drowsiness? No   15. Do you have any artifical heart valves or other implanted medical devices like a pacemaker, defibrillator, or continuous glucose monitor? No   16. Do you have artificial joints? YES - bilateral knees   17. Are you allergic to latex? No   18. Is there any chance that you may be pregnant? No     Preoperative Review of :   reviewed - no record of controlled substances prescribed.      Status of Chronic  Conditions:  HYPERTENSION - Patient has longstanding history of HTN , currently denies any symptoms referable to elevated blood pressure. Specifically denies chest pain, palpitations, dyspnea, orthopnea, PND or peripheral edema. Blood pressure readings have been in normal range. Current medication regimen is as listed below. Patient denies any side effects of medication.     HYPOTHYROIDISM - Patient has a longstanding history of chronic Hypothyroidism. Patient has been doing well, noting no tremor, insomnia, hair loss or changes in skin texture. Continues to take medications as directed, without adverse reactions or side effects. Last TSH   Lab Results   Component Value Date    TSH 3.04 01/11/2022   .        Review of Systems  Constitutional, HEENT, cardiovascular, pulmonary, GI, , musculoskeletal, neuro, skin, endocrine and psych systems are negative, except as otherwise noted.      Patient Active Problem List    Diagnosis Date Noted     Hypothyroidism, unspecified type 03/20/2017     Priority: High     Followed by endocrine (Jamie)       Hypertension goal BP (blood pressure) < 130/80 10/16/2012     Priority: High     Urinary urgency 06/21/2022     Priority: Medium     Surgical menopause on hormone replacement therapy 12/2020     Priority: Medium     Arthritis of right hand 08/03/2017     Priority: Medium     Arthralgia of right acromioclavicular joint 08/03/2017     Priority: Medium     Posttraumatic stress disorder 04/14/2016     Priority: Medium     History of arthroplasty of left knee 10/27/2015     Priority: Medium     Knee joint replacement by other means 04/07/2015     Priority: Medium     Osteoarthritis 02/27/2015     Priority: Medium      Past Medical History:   Diagnosis Date     Arthritis      Chronic pain     CHRONIC NECK PAIN     Depression 2005    on Lexapro     Dysfunctional uterine bleeding      Heart murmur      Hypertension      Menorrhagia      Miscarriage 11/2001     Morbid obesity (H)  10/28/2022     MVP (mitral valve prolapse)     Needs antibiotics for procedures     Obese      Other chronic pain      Other forms of migraine, without mention of intractable migraine without mention of status migrainosus     takes Zomig     Ovarian cyst 2019    found in ER, L side     Seizures (H)     Had seizure in . Was on Tegretol, but discontinued the meds on her own.Sees Dr. Gume Morgan at [facility].Was started on Depakote in  for migrines in addition to Zomig     Stress incontinence, female 2009    Dr. Kurtz-Cystocoele repair and pubovag slling     Surgical menopause on hormone replacement therapy 2020     Thyroid disease     on synthroid-Hashimotos thyroiditis; sees Lorenza     Past Surgical History:   Procedure Laterality Date     ARTHROPLASTY KNEE UNICOMPARTMENT Right 2015    Procedure: ARTHROPLASTY KNEE UNICOMPARTMENT;  Surgeon: Pablo Lara MD;  Location:  OR     ARTHROPLASTY KNEE UNICOMPARTMENT Left 10/27/2015    Procedure: ARTHROPLASTY KNEE UNICOMPARTMENT;  Surgeon: Pablo Lara MD;  Location:  OR      SECTION  1997    for breech     CYCTOCELE REPAIR  2010    sling     CYSTOSCOPY  2014    Procedure: CYSTOSCOPY;;  Surgeon: Ad Crandall MD;  Location:  [facility]     DECOMPRESSION LUMBAR ONE LEVEL Right 2015    Procedure: DECOMPRESSION LUMBAR ONE LEVEL;  Surgeon: Elio Starr MD;  Location:  OR     DILATION AND CURETTAGE, OPERATIVE HYSTEROSCOPY, COMBINED  2012    Procedure:COMBINED DILATION AND CURETTAGE, OPERATIVE HYSTEROSCOPY; OPERATIVE HYSTEROSCOPY, DILATION AND CURETTAGE, POSSIBLE RESECTION OF POLYP; Surgeon:AD CRANDALL; Location: [facility]     IR CERVICAL EPIDURAL STEROID INJECTION  2012     LAPAROSCOPIC HYSTERECTOMY SUPRACERVICAL  2014    Procedure: LAPAROSCOPIC HYSTERECTOMY SUPRACERVICAL;  LAPAROSCOPIC ASSISTED SUPRACERVICAL HYSTERECTOMY, cystoscopy;  Surgeon: Raz  "Candie Jones MD;  Location:  [facility]     LAPAROSCOPIC OOPHORECTOMY Bilateral 07/23/2020    Procedure: LAPAROSCOPIC BILATERAL OOPHORECTOMY;  Surgeon: Christiana Andersen MD;  Location:  OR     STRIP VEIN  11/2010     TONSILLECTOMY  1974     Current Outpatient Medications   Medication Sig Dispense Refill     Calcium-Magnesium-Zinc 333-133-5 MG TABS Take 1 tablet by mouth At Bedtime       diclofenac (VOLTAREN) 1 % topical gel Apply 2 g topically 4 times daily 150 g 1     escitalopram (LEXAPRO) 20 MG tablet TAKE 1 TABLET(20 MG) BY MOUTH DAILY 90 tablet 0     estradiol (ESTRACE) 0.5 MG tablet TAKE 1 TABLET BY MOUTH DAILY 90 tablet 0     IRON-FOLIC ACID PO        KRILL OIL PO Take 1 capsule by mouth every evening        levothyroxine (SYNTHROID/LEVOTHROID) 137 MCG tablet TAKE 1 TABLET(137 MCG) BY MOUTH DAILY 90 tablet 3     lisinopril (ZESTRIL) 10 MG tablet Take 1 tablet (10 mg) by mouth daily 90 tablet 3     meloxicam (MOBIC) 15 MG tablet TAKE 1 TABLET(15 MG) BY MOUTH DAILY 90 tablet 1     olopatadine (PATANOL) 0.1 % ophthalmic solution PLACE 1 DROP INTO BOTH EYES TWICE DAILY 5 mL 4     Pediatric Multivit-Minerals-C (MULTIVITAMIN GUMMIES CHILDRENS PO) Take 2 chew tab by mouth daily as needed       tolterodine ER (DETROL LA) 2 MG 24 hr capsule Take 2 capsules (4 mg) by mouth daily 90 capsule 3       Allergies   Allergen Reactions     Imitrex [Sumatriptan] Other (See Comments)     Throat tightness        Social History     Tobacco Use     Smoking status: Never     Smokeless tobacco: Never   Substance Use Topics     Alcohol use: Yes     Alcohol/week: 0.0 standard drinks     Comment: Socially       History   Drug Use No         Objective     /80 (BP Location: Left arm, Patient Position: Chair, Cuff Size: Adult Large)   Pulse 88   Temp 98  F (36.7  C) (Temporal)   Resp 16   Ht 1.664 m (5' 5.5\")   Wt 95.3 kg (210 lb)   LMP 03/17/2014   SpO2 100%   BMI 34.41 kg/m      Physical Exam    GENERAL APPEARANCE: " healthy, alert and no distress     EYES: EOMI, PERRL     HENT: ear canals and TM's normal and nose and mouth without ulcers or lesions     NECK: no adenopathy, no asymmetry, masses, or scars and thyroid normal to palpation     RESP: lungs clear to auscultation - no rales, rhonchi or wheezes     CV: regular rates and rhythm, normal S1 S2, no S3 or S4 and no murmur, click or rub     ABDOMEN:  soft, nontender, no HSM or masses and bowel sounds normal     MS: extremities normal- no gross deformities noted, no evidence of inflammation in joints, FROM in all extremities.     SKIN: no suspicious lesions or rashes     NEURO: Normal strength and tone, sensory exam grossly normal, mentation intact and speech normal     PSYCH: mentation appears normal. and affect normal/bright     LYMPHATICS: No cervical adenopathy    Recent Labs   Lab Test 01/11/22  1003      POTASSIUM 4.1   CR 0.76        Diagnostics:  Labs pending at this time.  Results will be reviewed when available.   No EKG required, no history of coronary heart disease, significant arrhythmia, peripheral arterial disease or other structural heart disease.    Revised Cardiac Risk Index (RCRI):  The patient has the following serious cardiovascular risks for perioperative complications:   - No serious cardiac risks = 0 points     RCRI Interpretation: 0 points: Class I (very low risk - 0.4% complication rate)           Signed Electronically by: Prudencio Conde MD  Copy of this evaluation report is provided to requesting physician.

## 2022-12-13 NOTE — PATIENT INSTRUCTIONS
For informational purposes only. Not to replace the advice of your health care provider. Copyright   2003,  [employer] [facility]. All rights reserved. Clinically reviewed by Raiza Conley MD. [employer] [id number] - REV .  Preparing for Your Surgery  Getting started  A nurse will call you to review your health history and instructions. They will give you an arrival time based on your scheduled surgery time. Please be ready to share:    Your doctor's clinic name and phone number    Your medical, surgical, and anesthesia history    A list of allergies and sensitivities    A list of medicines, including herbal treatments and over-the-counter drugs    Whether the patient has a legal guardian (ask how to send us the papers in advance)  Please tell us if you're pregnant--or if there's any chance you might be pregnant. Some surgeries may injure a fetus (unborn baby), so they require a pregnancy test. Surgeries that are safe for a fetus don't always need a test, and you can choose whether to have one.   If you have a child who's having surgery, please ask for a copy of Preparing for Your Child's Surgery.    Preparing for surgery    Within 10 to 30 days of surgery: Have a pre-op exam (sometimes called an H&P, or History and Physical). This can be done at a clinic or pre-operative center.  ? If you're having a , you may not need this exam. Talk to your care team.    At your pre-op exam, talk to your care team about all medicines you take. If you need to stop any medicines before surgery, ask when to start taking them again.  ? We do this for your safety. Many medicines can make you bleed too much during surgery. Some change how well surgery (anesthesia) drugs work.    Call your insurance company to let them know you're having surgery. (If you don't have insurance, call [phone].)    Call your clinic if there's any change in your health. This includes signs of a cold or flu (sore throat, runny nose,  cough, rash, fever). It also includes a scrape or scratch near the surgery site.    If you have questions on the day of surgery, call your hospital or surgery center.  Eating and drinking guidelines  For your safety: Unless your surgeon tells you otherwise, follow the guidelines below.    Eat and drink as usual until 8 hours before you arrive for surgery. After that, no food or milk.    Drink clear liquids until 2 hours before you arrive. These are liquids you can see through, like water, Gatorade, and Propel Water. They also include plain black coffee and tea (no cream or milk), candy, and breath mints. You can spit out gum when you arrive.    If you drink alcohol: Stop drinking it the night before surgery.    If your care team tells you to take medicine on the morning of surgery, it's okay to take it with a sip of water.  Preventing infection    Shower or bathe the night before and morning of your surgery. Follow the instructions your clinic gave you. (If no instructions, use regular soap.)    Don't shave or clip hair near your surgery site. We'll remove the hair if needed.    Don't smoke or vape the morning of surgery. You may chew nicotine gum up to 2 hours before surgery. A nicotine patch is okay.  ? Note: Some surgeries require you to completely quit smoking and nicotine. Check with your surgeon.    Your care team will make every effort to keep you safe from infection. We will:  ? Clean our hands often with soap and water (or an alcohol-based hand rub).  ? Clean the skin at your surgery site with a special soap that kills germs.  ? Give you a special gown to keep you warm. (Cold raises the risk of infection.)  ? Wear special hair covers, masks, gowns and gloves during surgery.  ? Give antibiotic medicine, if prescribed. Not all surgeries need antibiotics.  What to bring on the day of surgery    Photo ID and insurance card    Copy of your health care directive, if you have one    Glasses and hearing aids (bring  cases)  ? You can't wear contacts during surgery    Inhaler and eye drops, if you use them (tell us about these when you arrive)    CPAP machine or breathing device, if you use them    A few personal items, if spending the night    If you have . . .  ? A pacemaker, ICD (cardiac defibrillator) or other implant: Bring the ID card.  ? An implanted stimulator: Bring the remote control.  ? A legal guardian: Bring a copy of the certified (court-stamped) guardianship papers.  Please remove any jewelry, including body piercings. Leave jewelry and other valuables at home.  If you're going home the day of surgery    You must have a responsible adult drive you home. They should stay with you overnight as well.    If you don't have someone to stay with you, and you aren't safe to go home alone, we may keep you overnight. Insurance often won't pay for this.  After surgery  If it's hard to control your pain or you need more pain medicine, please call your surgeon's office.  Questions?   If you have any questions for your care team, list them here: _________________________________________________________________________________________________________________________________________________________________________ ____________________________________ ____________________________________ ____________________________________    For informational purposes only. Not to replace the advice of your health care provider. Copyright   2003, 2019 [employer]. All rights reserved. Clinically reviewed by Raiza Conley MD. [employer] [id number] - REV 12/22.  Preparing for Your Surgery  Getting started  A nurse will call you to review your health history and instructions. They will give you an arrival time based on your scheduled surgery time. Please be ready to share:    Your doctor's clinic name and phone number    Your medical, surgical, and anesthesia history    A list of allergies and sensitivities    A list of medicines, including  herbal treatments and over-the-counter drugs    Whether the patient has a legal guardian (ask how to send us the papers in advance)  Please tell us if you're pregnant--or if there's any chance you might be pregnant. Some surgeries may injure a fetus (unborn baby), so they require a pregnancy test. Surgeries that are safe for a fetus don't always need a test, and you can choose whether to have one.   If you have a child who's having surgery, please ask for a copy of Preparing for Your Child's Surgery.    Preparing for surgery    Within 10 to 30 days of surgery: Have a pre-op exam (sometimes called an H&P, or History and Physical). This can be done at a clinic or pre-operative center.  ? If you're having a , you may not need this exam. Talk to your care team.    At your pre-op exam, talk to your care team about all medicines you take. If you need to stop any medicines before surgery, ask when to start taking them again.  ? We do this for your safety. Many medicines can make you bleed too much during surgery. Some change how well surgery (anesthesia) drugs work.    Call your insurance company to let them know you're having surgery. (If you don't have insurance, call [phone].)    Call your clinic if there's any change in your health. This includes signs of a cold or flu (sore throat, runny nose, cough, rash, fever). It also includes a scrape or scratch near the surgery site.    If you have questions on the day of surgery, call your hospital or surgery center.  Eating and drinking guidelines  For your safety: Unless your surgeon tells you otherwise, follow the guidelines below.    Eat and drink as usual until 8 hours before you arrive for surgery. After that, no food or milk.    Drink clear liquids until 2 hours before you arrive. These are liquids you can see through, like water, Gatorade, and Propel Water. They also include plain black coffee and tea (no cream or milk), candy, and breath mints. You can  spit out gum when you arrive.    If you drink alcohol: Stop drinking it the night before surgery.    If your care team tells you to take medicine on the morning of surgery, it's okay to take it with a sip of water.  Preventing infection    Shower or bathe the night before and morning of your surgery. Follow the instructions your clinic gave you. (If no instructions, use regular soap.)    Don't shave or clip hair near your surgery site. We'll remove the hair if needed.    Don't smoke or vape the morning of surgery. You may chew nicotine gum up to 2 hours before surgery. A nicotine patch is okay.  ? Note: Some surgeries require you to completely quit smoking and nicotine. Check with your surgeon.    Your care team will make every effort to keep you safe from infection. We will:  ? Clean our hands often with soap and water (or an alcohol-based hand rub).  ? Clean the skin at your surgery site with a special soap that kills germs.  ? Give you a special gown to keep you warm. (Cold raises the risk of infection.)  ? Wear special hair covers, masks, gowns and gloves during surgery.  ? Give antibiotic medicine, if prescribed. Not all surgeries need antibiotics.  What to bring on the day of surgery    Photo ID and insurance card    Copy of your health care directive, if you have one    Glasses and hearing aids (bring cases)  ? You can't wear contacts during surgery    Inhaler and eye drops, if you use them (tell us about these when you arrive)    CPAP machine or breathing device, if you use them    A few personal items, if spending the night    If you have . . .  ? A pacemaker, ICD (cardiac defibrillator) or other implant: Bring the ID card.  ? An implanted stimulator: Bring the remote control.  ? A legal guardian: Bring a copy of the certified (court-stamped) guardianship papers.  Please remove any jewelry, including body piercings. Leave jewelry and other valuables at home.  If you're going home the day of surgery    You  must have a responsible adult drive you home. They should stay with you overnight as well.    If you don't have someone to stay with you, and you aren't safe to go home alone, we may keep you overnight. Insurance often won't pay for this.  After surgery  If it's hard to control your pain or you need more pain medicine, please call your surgeon's office.  Questions?   If you have any questions for your care team, list them here: _________________________________________________________________________________________________________________________________________________________________________ ____________________________________ ____________________________________ ____________________________________

## 2022-12-14 LAB
CREAT UR-MCNC: 30.8 MG/DL
MICROALBUMIN UR-MCNC: <12 MG/L
MICROALBUMIN/CREAT UR: NORMAL MG/G{CREAT}
TSH SERPL DL<=0.005 MIU/L-ACNC: 0.38 UIU/ML (ref 0.3–4.2)

## 2022-12-14 RX ORDER — TOLTERODINE 2 MG/1
CAPSULE, EXTENDED RELEASE ORAL
Qty: 90 CAPSULE | Refills: 1 | Status: SHIPPED | OUTPATIENT
Start: 2022-12-14 | End: 2023-01-10

## 2022-12-14 NOTE — TELEPHONE ENCOUNTER
"Requested Prescriptions   Pending Prescriptions Disp Refills     tolterodine ER (DETROL LA) 2 MG 24 hr capsule [Pharmacy Med Name: TOLTERODINE TART 2MG ER CAPSULES] 90 capsule 3     Sig: TAKE 2 CAPSULES(4 MG) BY MOUTH DAILY       Muscarinic Antagonists (Urinary Incontinence Agents) Passed - 12/13/2022 10:37 PM        Passed - Recent (12 mo) or future (30 days) visit within the authorizing provider's specialty     Patient has had an office visit with the authorizing provider or a provider within the authorizing providers department within the previous 12 mos or has a future within next 30 days. See \"Patient Info\" tab in inbasket, or \"Choose Columns\" in Meds & Orders section of the refill encounter.              Passed - Patient does not have a diagnosis of glaucoma on the problem list     If glaucoma diagnosis is new, refer refill to physician.          Passed - Medication is active on med list        Passed - Patient is 18 years of age or older           Verified with [facility] preferred pharmacy  Refill approved  Hilda Schmitt RN on 12/14/2022 at 11:59 AM    "

## 2022-12-15 NOTE — RESULT ENCOUNTER NOTE
Dear Candie,    Here is a summary of your recent test results:  -TSH (thyroid stimulating hormone) level is normal which indicates appropriate thyroid replacement dosing.  ADVISE: continuing same replacement dose and rechecking this in 12 months.  -Microalbumin (urine protein) test is normal.  ADVISE: rechecking this annually.    For additional lab test information, [email or web address] is a very good reference.    In addition, here is a list of due or overdue Health Maintenance reminders:  COVID-19 Vaccine(1) Never done  Zoster (Shingles) Vaccine(1 of 2) Never done  Complete Blood Count due on 10/05/2021  HPV Screening due on 06/23/2022  PAP Smear due on 06/23/2022  Yearly Preventive Visit due on 01/04/2023  Mammogram due on 01/04/2023  Comprehensive Metabolic Panel due on 01/11/2023  Cholesterol Lab due on 01/11/2023    Please call us at [phone] (or use [email or web address]) to address the above recommendations if needed.           Thank you very much for trusting me and [facility].     Have a peaceful day.    Healthy regards,  Sonny Conde MD

## 2022-12-28 ENCOUNTER — ANESTHESIA (OUTPATIENT)
Dept: SURGERY | Facility: CLINIC | Age: 52
End: 2022-12-28
Payer: COMMERCIAL

## 2022-12-28 ENCOUNTER — ANESTHESIA EVENT (OUTPATIENT)
Dept: SURGERY | Facility: CLINIC | Age: 52
End: 2022-12-28
Payer: COMMERCIAL

## 2022-12-28 ENCOUNTER — HOSPITAL ENCOUNTER (OUTPATIENT)
Facility: CLINIC | Age: 52
Discharge: HOME | End: 2022-12-28
Attending: PODIATRIST | Admitting: PODIATRIST
Payer: COMMERCIAL

## 2022-12-28 VITALS
DIASTOLIC BLOOD PRESSURE: 85 MMHG | HEIGHT: 66 IN | OXYGEN SATURATION: 95 % | TEMPERATURE: 97.2 F | SYSTOLIC BLOOD PRESSURE: 146 MMHG | RESPIRATION RATE: 20 BRPM | BODY MASS INDEX: 34.78 KG/M2 | HEART RATE: 87 BPM | WEIGHT: 216.4 LBS

## 2022-12-28 DIAGNOSIS — Z98.890 S/P PERONEAL TENDON REPAIR: Primary | ICD-10-CM

## 2022-12-28 PROCEDURE — 710N000012 HC RECOVERY PHASE 2, PER MINUTE: Performed by: PODIATRIST

## 2022-12-28 PROCEDURE — 250N000009 HC RX 250: Performed by: ANESTHESIOLOGY

## 2022-12-28 PROCEDURE — 250N000011 HC RX IP 250 OP 636: Performed by: ANESTHESIOLOGY

## 2022-12-28 PROCEDURE — 360N000076 HC SURGERY LEVEL 3, PER MIN: Performed by: PODIATRIST

## 2022-12-28 PROCEDURE — 370N000017 HC ANESTHESIA TECHNICAL FEE, PER MIN: Performed by: PODIATRIST

## 2022-12-28 PROCEDURE — 250N000009 HC RX 250: Performed by: PODIATRIST

## 2022-12-28 PROCEDURE — 271N000001 HC OR GENERAL SUPPLY NON-STERILE: Performed by: PODIATRIST

## 2022-12-28 PROCEDURE — 250N000011 HC RX IP 250 OP 636: Performed by: PODIATRIST

## 2022-12-28 PROCEDURE — 250N000009 HC RX 250: Performed by: NURSE ANESTHETIST, CERTIFIED REGISTERED

## 2022-12-28 PROCEDURE — 710N000009 HC RECOVERY PHASE 1, LEVEL 1, PER MIN: Performed by: PODIATRIST

## 2022-12-28 PROCEDURE — 258N000003 HC RX IP 258 OP 636: Performed by: NURSE ANESTHETIST, CERTIFIED REGISTERED

## 2022-12-28 PROCEDURE — 258N000003 HC RX IP 258 OP 636: Performed by: ANESTHESIOLOGY

## 2022-12-28 PROCEDURE — 272N000001 HC OR GENERAL SUPPLY STERILE: Performed by: PODIATRIST

## 2022-12-28 PROCEDURE — 999N000141 HC STATISTIC PRE-PROCEDURE NURSING ASSESSMENT: Performed by: PODIATRIST

## 2022-12-28 PROCEDURE — L4360 PNEUMAT WALKING BOOT PRE CST: HCPCS

## 2022-12-28 PROCEDURE — 27658 REPAIR OF LEG TENDON EACH: CPT | Mod: LT | Performed by: PODIATRIST

## 2022-12-28 PROCEDURE — 250N000011 HC RX IP 250 OP 636: Performed by: NURSE ANESTHETIST, CERTIFIED REGISTERED

## 2022-12-28 PROCEDURE — 250N000025 HC SEVOFLURANE, PER MIN: Performed by: PODIATRIST

## 2022-12-28 RX ORDER — GLYCOPYRROLATE 0.2 MG/ML
INJECTION, SOLUTION INTRAMUSCULAR; INTRAVENOUS PRN
Status: DISCONTINUED | OUTPATIENT
Start: 2022-12-28 | End: 2022-12-28

## 2022-12-28 RX ORDER — MAGNESIUM HYDROXIDE 1200 MG/15ML
LIQUID ORAL PRN
Status: DISCONTINUED | OUTPATIENT
Start: 2022-12-28 | End: 2022-12-28 | Stop reason: HOSPADM

## 2022-12-28 RX ORDER — SODIUM CHLORIDE, SODIUM LACTATE, POTASSIUM CHLORIDE, CALCIUM CHLORIDE 600; 310; 30; 20 MG/100ML; MG/100ML; MG/100ML; MG/100ML
INJECTION, SOLUTION INTRAVENOUS CONTINUOUS
Status: DISCONTINUED | OUTPATIENT
Start: 2022-12-28 | End: 2022-12-28 | Stop reason: HOSPADM

## 2022-12-28 RX ORDER — ONDANSETRON 2 MG/ML
INJECTION INTRAMUSCULAR; INTRAVENOUS PRN
Status: DISCONTINUED | OUTPATIENT
Start: 2022-12-28 | End: 2022-12-28

## 2022-12-28 RX ORDER — IBUPROFEN 600 MG/1
TABLET, FILM COATED ORAL
Qty: 28 TABLET | Refills: 0 | Status: SHIPPED | OUTPATIENT
Start: 2022-12-28 | End: 2023-03-10

## 2022-12-28 RX ORDER — CEFAZOLIN SODIUM/WATER 2 G/20 ML
2 SYRINGE (ML) INTRAVENOUS
Status: COMPLETED | OUTPATIENT
Start: 2022-12-28 | End: 2022-12-28

## 2022-12-28 RX ORDER — DEXAMETHASONE SODIUM PHOSPHATE 4 MG/ML
INJECTION, SOLUTION INTRA-ARTICULAR; INTRALESIONAL; INTRAMUSCULAR; INTRAVENOUS; SOFT TISSUE PRN
Status: DISCONTINUED | OUTPATIENT
Start: 2022-12-28 | End: 2022-12-28

## 2022-12-28 RX ORDER — EPHEDRINE SULFATE 50 MG/ML
INJECTION, SOLUTION INTRAMUSCULAR; INTRAVENOUS; SUBCUTANEOUS PRN
Status: DISCONTINUED | OUTPATIENT
Start: 2022-12-28 | End: 2022-12-28

## 2022-12-28 RX ORDER — PROPOFOL 10 MG/ML
INJECTION, EMULSION INTRAVENOUS PRN
Status: DISCONTINUED | OUTPATIENT
Start: 2022-12-28 | End: 2022-12-28

## 2022-12-28 RX ORDER — FENTANYL CITRATE 0.05 MG/ML
25 INJECTION, SOLUTION INTRAMUSCULAR; INTRAVENOUS EVERY 5 MIN PRN
Status: DISCONTINUED | OUTPATIENT
Start: 2022-12-28 | End: 2022-12-28 | Stop reason: HOSPADM

## 2022-12-28 RX ORDER — ONDANSETRON 2 MG/ML
4 INJECTION INTRAMUSCULAR; INTRAVENOUS EVERY 30 MIN PRN
Status: DISCONTINUED | OUTPATIENT
Start: 2022-12-28 | End: 2022-12-28 | Stop reason: HOSPADM

## 2022-12-28 RX ORDER — HYDROMORPHONE HCL IN WATER/PF 6 MG/30 ML
0.4 PATIENT CONTROLLED ANALGESIA SYRINGE INTRAVENOUS EVERY 5 MIN PRN
Status: DISCONTINUED | OUTPATIENT
Start: 2022-12-28 | End: 2022-12-28 | Stop reason: HOSPADM

## 2022-12-28 RX ORDER — HYDROMORPHONE HCL IN WATER/PF 6 MG/30 ML
0.2 PATIENT CONTROLLED ANALGESIA SYRINGE INTRAVENOUS EVERY 5 MIN PRN
Status: DISCONTINUED | OUTPATIENT
Start: 2022-12-28 | End: 2022-12-28 | Stop reason: HOSPADM

## 2022-12-28 RX ORDER — PROPOFOL 10 MG/ML
INJECTION, EMULSION INTRAVENOUS CONTINUOUS PRN
Status: DISCONTINUED | OUTPATIENT
Start: 2022-12-28 | End: 2022-12-28

## 2022-12-28 RX ORDER — CEFAZOLIN SODIUM/WATER 2 G/20 ML
2 SYRINGE (ML) INTRAVENOUS SEE ADMIN INSTRUCTIONS
Status: DISCONTINUED | OUTPATIENT
Start: 2022-12-28 | End: 2022-12-28 | Stop reason: HOSPADM

## 2022-12-28 RX ORDER — HYDROXYZINE HYDROCHLORIDE 25 MG/1
TABLET, FILM COATED ORAL
Qty: 20 TABLET | Refills: 0 | Status: SHIPPED | OUTPATIENT
Start: 2022-12-28 | End: 2023-01-10

## 2022-12-28 RX ORDER — FENTANYL CITRATE 0.05 MG/ML
50 INJECTION, SOLUTION INTRAMUSCULAR; INTRAVENOUS EVERY 5 MIN PRN
Status: DISCONTINUED | OUTPATIENT
Start: 2022-12-28 | End: 2022-12-28 | Stop reason: HOSPADM

## 2022-12-28 RX ORDER — ONDANSETRON 4 MG/1
4 TABLET, ORALLY DISINTEGRATING ORAL EVERY 30 MIN PRN
Status: DISCONTINUED | OUTPATIENT
Start: 2022-12-28 | End: 2022-12-28 | Stop reason: HOSPADM

## 2022-12-28 RX ORDER — HYDROCODONE BITARTRATE AND ACETAMINOPHEN 5; 325 MG/1; MG/1
1 TABLET ORAL
Status: DISCONTINUED | OUTPATIENT
Start: 2022-12-28 | End: 2022-12-28 | Stop reason: HOSPADM

## 2022-12-28 RX ORDER — SODIUM CHLORIDE, SODIUM LACTATE, POTASSIUM CHLORIDE, CALCIUM CHLORIDE 600; 310; 30; 20 MG/100ML; MG/100ML; MG/100ML; MG/100ML
INJECTION, SOLUTION INTRAVENOUS CONTINUOUS PRN
Status: DISCONTINUED | OUTPATIENT
Start: 2022-12-28 | End: 2022-12-28

## 2022-12-28 RX ORDER — HYDROCODONE BITARTRATE AND ACETAMINOPHEN 5; 325 MG/1; MG/1
TABLET ORAL
Qty: 20 TABLET | Refills: 0 | Status: SHIPPED | OUTPATIENT
Start: 2022-12-28 | End: 2023-01-10

## 2022-12-28 RX ORDER — LIDOCAINE HYDROCHLORIDE 20 MG/ML
INJECTION, SOLUTION INFILTRATION; PERINEURAL PRN
Status: DISCONTINUED | OUTPATIENT
Start: 2022-12-28 | End: 2022-12-28

## 2022-12-28 RX ORDER — FENTANYL CITRATE 50 UG/ML
INJECTION, SOLUTION INTRAMUSCULAR; INTRAVENOUS PRN
Status: DISCONTINUED | OUTPATIENT
Start: 2022-12-28 | End: 2022-12-28

## 2022-12-28 RX ADMIN — MIDAZOLAM 2 MG: 1 INJECTION INTRAMUSCULAR; INTRAVENOUS at 10:13

## 2022-12-28 RX ADMIN — Medication 2 G: at 10:53

## 2022-12-28 RX ADMIN — SODIUM CHLORIDE, POTASSIUM CHLORIDE, SODIUM LACTATE AND CALCIUM CHLORIDE: 600; 310; 30; 20 INJECTION, SOLUTION INTRAVENOUS at 10:52

## 2022-12-28 RX ADMIN — FENTANYL CITRATE 25 MCG: 50 INJECTION, SOLUTION INTRAMUSCULAR; INTRAVENOUS at 11:22

## 2022-12-28 RX ADMIN — DEXAMETHASONE SODIUM PHOSPHATE 8 MG: 4 INJECTION, SOLUTION INTRA-ARTICULAR; INTRALESIONAL; INTRAMUSCULAR; INTRAVENOUS; SOFT TISSUE at 11:00

## 2022-12-28 RX ADMIN — PROPOFOL 200 MG: 10 INJECTION, EMULSION INTRAVENOUS at 11:00

## 2022-12-28 RX ADMIN — GLYCOPYRROLATE 0.3 MG: 0.2 INJECTION, SOLUTION INTRAMUSCULAR; INTRAVENOUS at 11:36

## 2022-12-28 RX ADMIN — SODIUM CHLORIDE, POTASSIUM CHLORIDE, SODIUM LACTATE AND CALCIUM CHLORIDE: 600; 310; 30; 20 INJECTION, SOLUTION INTRAVENOUS at 12:01

## 2022-12-28 RX ADMIN — LIDOCAINE HYDROCHLORIDE 100 MG: 20 INJECTION, SOLUTION INFILTRATION; PERINEURAL at 11:00

## 2022-12-28 RX ADMIN — SODIUM CHLORIDE, POTASSIUM CHLORIDE, SODIUM LACTATE AND CALCIUM CHLORIDE: 600; 310; 30; 20 INJECTION, SOLUTION INTRAVENOUS at 10:24

## 2022-12-28 RX ADMIN — PROPOFOL 30 MCG/KG/MIN: 10 INJECTION, EMULSION INTRAVENOUS at 11:00

## 2022-12-28 RX ADMIN — FENTANYL CITRATE 50 MCG: 50 INJECTION, SOLUTION INTRAMUSCULAR; INTRAVENOUS at 12:01

## 2022-12-28 RX ADMIN — BUPIVACAINE HYDROCHLORIDE 20 ML: 5 INJECTION, SOLUTION EPIDURAL; INTRACAUDAL at 10:20

## 2022-12-28 RX ADMIN — Medication 10 MG: at 11:17

## 2022-12-28 RX ADMIN — FENTANYL CITRATE 50 MCG: 50 INJECTION, SOLUTION INTRAMUSCULAR; INTRAVENOUS at 10:13

## 2022-12-28 RX ADMIN — MIDAZOLAM 2 MG: 1 INJECTION INTRAMUSCULAR; INTRAVENOUS at 10:52

## 2022-12-28 RX ADMIN — Medication 10 MG: at 11:35

## 2022-12-28 RX ADMIN — Medication 5 MG: at 11:19

## 2022-12-28 RX ADMIN — Medication 10 MG: at 11:21

## 2022-12-28 RX ADMIN — FENTANYL CITRATE 25 MCG: 50 INJECTION, SOLUTION INTRAMUSCULAR; INTRAVENOUS at 11:02

## 2022-12-28 RX ADMIN — ONDANSETRON 4 MG: 2 INJECTION INTRAMUSCULAR; INTRAVENOUS at 11:30

## 2022-12-28 ASSESSMENT — ACTIVITIES OF DAILY LIVING (ADL)
ADLS_ACUITY_SCORE: 35

## 2022-12-28 NOTE — ANESTHESIA PROCEDURE NOTES
Saphenous Procedure Note    Pre-Procedure   Staff -        Anesthesiologist:  Martin Murillo MD       Performed By: anesthesiologist       Location: pre-op       Pre-Anesthestic Checklist: patient identified, IV checked, site marked, risks and benefits discussed, informed consent, monitors and equipment checked, pre-op evaluation, at physician/surgeon's request and post-op pain management  Timeout:       Correct Patient: Yes        Correct Procedure: Yes        Correct Site: Yes        Correct Position: Yes        Correct Laterality: Yes        Site Marked: Yes  Procedure Documentation  Procedure: Saphenous       Laterality: left       Patient Position: supine       Skin prep: Chloraprep       Needle Type: short bevel       Needle Gauge: 21.        Needle Length (Inches): 4        Ultrasound guided       1. Ultrasound was used to identify targeted nerve, plexus, vascular marker, or fascial plane and place a needle adjacent to it in real-time.       2. Ultrasound was used to visualize the spread of anesthetic in close proximity to the above referenced structure.       3. A permanent image is entered into the patient's record.    Assessment/Narrative         The placement was negative for: blood aspirated, painful injection and site bleeding       Paresthesias: No.       Complications: none    Medication(s) Administered   Bupivacaine 0.5% w/ 1:400K Epi (Injection) - Injection   10 mL - 12/28/2022 10:22:00 AM   Comments:  Ultrasound Interpretation, Peripheral Nerve Block    1. Under ultrasound guidance, the needle was inserted and placed in close proximity to the target nerve(s).  2. Ultrasound was also used to visualize the spread of the anesthetic in close proximity to the nerve(s) being blocked.  Local anesthetic was administered in incremental doses, with intermittent negative aspiration.    3. The nerve(s) appeared anatomically normal.  4. There were no apparent abnormal pathological findings.  5. A  "permanent ultrasound image was saved in the patient's record.    Pt tolerated well.    No complications.      The surgeon has given a verbal order transferring care of this patient to me for the performance of a regional analgesia block for post-op pain control. It is requested of me because I am uniquely trained and qualified to perform this block and the surgeon is neither trained nor qualified to perform this procedure.    Martin Murillo MD   10:36 AM      FOR [facility] ([facility]) ONLY:   Pain Team Contact information: please page the Pain Team Via [employer]. Search \"Pain\". During daytime hours, please page the attending first. At night please page the resident first.    "

## 2022-12-28 NOTE — ANESTHESIA POSTPROCEDURE EVALUATION
Patient: Christine Dubose    Procedure: Procedure(s):  LEFT PERONEAL TENDON REPAIR       Anesthesia Type:  General    Note:  Disposition: Outpatient   Postop Pain Control: Uneventful            Sign Out: Well controlled pain   PONV:    Neuro/Psych: Uneventful            Sign Out: Acceptable/Baseline neuro status   Airway/Respiratory: Uneventful            Sign Out: Acceptable/Baseline resp. status   CV/Hemodynamics: Uneventful            Sign Out: Acceptable CV status   Other NRE: NONE   DID A NON-ROUTINE EVENT OCCUR? No    Event details/Postop Comments:  Recovery from regional anesthesia has not occurred but is expected within 48 hours.             Last vitals:  Vitals Value Taken Time   /70 12/28/22 1230   Temp 36.2  C (97.1  F) 12/28/22 1230   Pulse 91 12/28/22 1237   Resp 57 12/28/22 1237   SpO2 98 % 12/28/22 1239   Vitals shown include unvalidated device data.    Electronically Signed By: Martin Murillo MD  December 28, 2022  3:29 PM

## 2022-12-28 NOTE — ANESTHESIA PROCEDURE NOTES
Sciatic and Popliteal Procedure Note    Pre-Procedure   Staff -        Anesthesiologist:  Martin Murillo MD       Performed By: anesthesiologist       Location: pre-op       Pre-Anesthestic Checklist: patient identified, IV checked, site marked, risks and benefits discussed, informed consent, monitors and equipment checked, pre-op evaluation, at physician/surgeon's request and post-op pain management  Timeout:       Correct Patient: Yes        Correct Procedure: Yes        Correct Site: Yes        Correct Position: Yes        Correct Laterality: Yes        Site Marked: Yes  Procedure Documentation  Procedure: Sciatic, Popliteal       Laterality: left       Patient Position: supine       Patient Prep/Sterile Barriers: sterile gloves, mask, patient draped       Skin prep: Chloraprep       Needle Type: short bevel       Needle Gauge: 21.        Needle Length (Inches): 4        Ultrasound guided       1. Ultrasound was used to identify targeted nerve, plexus, vascular marker, or fascial plane and place a needle adjacent to it in real-time.       2. Ultrasound was used to visualize the spread of anesthetic in close proximity to the above referenced structure.       3. A permanent image is entered into the patient's record.       4. The visualized anatomic structures appeared normal.       5. There were no apparent abnormal pathologic findings.    Assessment/Narrative         The placement was negative for: blood aspirated, painful injection and site bleeding       Paresthesias: No.       Insertion/Infusion Method: Single Shot       Complications: none       Injection made incrementally with aspirations every 5 mL.    Medication(s) Administered   Bupivacaine 0.5% w/ 1:400K Epi (Injection) - Injection   20 mL - 12/28/2022 10:20:00 AM   Comments:  20 CC of 0.5 % Bupivacaine with 1/400k epinephrine injected easily around sciatic nerve.  No complications.    Ultrasound Interpretation, Peripheral Nerve Block    1. Under  "ultrasound guidance, the needle was inserted and placed in close proximity to the target nerve(s).  2. Ultrasound was also used to visualize the spread of the anesthetic in close proximity to the nerve(s) being blocked.  Local anesthetic was administered in incremental doses, with intermittent negative aspiration.    3. The nerve(s) appeared anatomically normal.  4. There were no apparent abnormal pathological findings.  5. A permanent ultrasound image was saved in the patient's record.    Pt tolerated well.    No complications.      The surgeon has given a verbal order transferring care of this patient to me for the performance of a regional analgesia block for post-op pain control. It is requested of me because I am uniquely trained and qualified to perform this block and the surgeon is neither trained nor qualified to perform this procedure.          FOR [facility] ([facility]) ONLY:   Pain Team Contact information: please page the Pain Team Via [employer]. Search \"Pain\". During daytime hours, please page the attending first. At night please page the resident first.    "

## 2022-12-28 NOTE — OP NOTE
Procedure Date: 12/28/2022    SURGEON:  Nick Beebe DPM    ASSISTANT:  None.    PREOPERATIVE DIAGNOSES:  Left peroneal tenosynovitis with peroneus longus tendinopathy and sagittal split tear of the peroneus brevis distal to the tip of the fibula.    POSTOPERATIVE DIAGNOSES:  Left peroneal tenosynovitis with peroneus longus tendinopathy and sagittal split tear of the peroneus brevis distal to the tip of the fibula.    PROCEDURE:  Left peroneus longus tendon debridement and repair.    ANESTHESIA:  General endotracheal with popliteal block.    HEMOSTASIS:  Well-padded pneumatic thigh cuff.    ESTIMATED BLOOD LOSS:  Less than 5 mL.    MATERIALS:  2-0 FiberWire.    INJECTABLES:  Preoperative popliteal block by Anesthesia.    COMPLICATIONS:  None apparent.    INDICATIONS FOR PROCEDURE:  The patient is a pleasant 52-year-old female who I have seen in the past for lateral left ankle pain.  The patient had tenderness on palpation of the peroneal tendons from the lateral malleolus to the fifth metatarsal base.  A preoperative MRI showed high-grade peroneal tenosynovitis with tendinopathy of the peroneus longus and subtle split tear of the peroneus brevis tendon.  She wished to forego further nonoperative management in favor of surgical intervention.    DESCRIPTION OF PROCEDURE:  After obtaining written consent, the patient received a preoperative popliteal block.  She was then transferred to the operating room and placed under general anesthesia on the OR table.  She was then placed in a well-padded lateral left side up decubitus position.  The left lower extremity was then prepped and draped in normal aseptic fashion, exsanguinated, and the well-padded pneumatic thigh cuff was inflated.  The patient, procedure, and site were correctly identified by OR staff.    Left peroneus longus tendon debridement and repair:  Attention was directed to the lateral ankle where pertinent anatomy was drawn out.  An 8 cm  curvilinear incision was carried down to subcutaneous tissue along the course of the peroneal tendons.  Bleeding vessels were electrocauterized as necessary.  The sural nerve was not identified during the procedure.  Blunt dissection was used to carry the incision down to the peroneal tendon sheath, which was longitudinally incised.  There was normal/slightly above normal fluid within the tendon sheath.  The tendon sheath was opened from the fifth metatarsal base to the posterior aspect of the fibula.  The peroneus brevis tendon was identified and showed essentially no thickening or visible tears.  There was low lying muscle belly within the tendon sheath, but otherwise this appeared unremarkable.  The peroneus longus tendon had two isolated areas of thickening/degeneration.  These were sharply excised with a 15 blade and both areas were repaired with an interlocking 2-0 FiberWire stitch.  The retromalleolar groove appeared to be of adequate depth, and there were no sharp osseous prominences noted at the base of the wound.  I elected to leave the low lying muscle belly in place.  My concern would be that if this was resected, she would bleed extensively within the peroneal tendon sheath, which would likely lead to fibrosis and scar tissue.    The wound was flushed with copious amounts of normal saline.  Layered closure was performed with 3-0 Vicryl, 4-0 Vicryl and skin staples.  A dry sterile dressing was applied to the patient's left lower extremity.  She appeared to tolerate the procedure and anesthesia well and was transferred to the PACU with vital signs stable and vascular status intact.  The patient will be nonweightbearing and will follow up with me in clinic in approximately 1 week or sooner with any acute issues.    Nick Beebe DPM        D: 2022   T: 2022   MT: CHADWICK    Name:     SUNSHINE SCHAEFFERErnesto  MRN:      [id number]        Account:        [id number]   :      1970            Procedure Date: 12/28/2022     Document: [id number]

## 2022-12-28 NOTE — ANESTHESIA PREPROCEDURE EVALUATION
Anesthesia Pre-Procedure Evaluation    Patient: Christine Dubose   MRN: [id number] : 1970        Procedure : Procedure(s):  LEFT PERONEAL TENDON REPAIR          Past Medical History:   Diagnosis Date     Arthritis      Chronic pain     CHRONIC NECK PAIN     Depression     on Lexapro     Dysfunctional uterine bleeding      Heart murmur      Hypertension      Menorrhagia      Miscarriage 2001     Morbid obesity (H) 10/28/2022     MVP (mitral valve prolapse)     Needs antibiotics for procedures     Obese      Other chronic pain      Other forms of migraine, without mention of intractable migraine without mention of status migrainosus     takes Zomig     Ovarian cyst 2019    found in ER, L side     Seizures (H)     Had seizure in . Was on Tegretol, but discontinued the meds on her own.Sees Dr. Gume Morgan at [facility].Was started on Depakote in  for migrines in addition to Zomig     Stress incontinence, female 2009    Dr. Kurtz-Cystocoele repair and pubovag slling     Surgical menopause on hormone replacement therapy 2020     Thyroid disease     on synthroid-Hashimotos thyroiditis; sees Lorenza      Past Surgical History:   Procedure Laterality Date     ARTHROPLASTY KNEE UNICOMPARTMENT Right 2015    Procedure: ARTHROPLASTY KNEE UNICOMPARTMENT;  Surgeon: Pablo Lara MD;  Location:  OR     ARTHROPLASTY KNEE UNICOMPARTMENT Left 10/27/2015    Procedure: ARTHROPLASTY KNEE UNICOMPARTMENT;  Surgeon: Pablo Lara MD;  Location:  OR      SECTION  1997    for breech     CYCTOCELE REPAIR  2010    sling     CYSTOSCOPY  2014    Procedure: CYSTOSCOPY;;  Surgeon: Candie Crandall MD;  Location:  SD     DECOMPRESSION LUMBAR ONE LEVEL Right 2015    Procedure: DECOMPRESSION LUMBAR ONE LEVEL;  Surgeon: Elio Starr MD;  Location:  OR     DILATION AND CURETTAGE, OPERATIVE HYSTEROSCOPY, COMBINED  2012     Procedure:COMBINED DILATION AND CURETTAGE, OPERATIVE HYSTEROSCOPY; OPERATIVE HYSTEROSCOPY, DILATION AND CURETTAGE, POSSIBLE RESECTION OF POLYP; Surgeon:AD CRANDALL; Location:[facility]     IR CERVICAL EPIDURAL STEROID INJECTION  11/28/2012     LAPAROSCOPIC HYSTERECTOMY SUPRACERVICAL  04/09/2014    Procedure: LAPAROSCOPIC HYSTERECTOMY SUPRACERVICAL;  LAPAROSCOPIC ASSISTED SUPRACERVICAL HYSTERECTOMY, cystoscopy;  Surgeon: Ad Crandall MD;  Location: [facility]     LAPAROSCOPIC OOPHORECTOMY Bilateral 07/23/2020    Procedure: LAPAROSCOPIC BILATERAL OOPHORECTOMY;  Surgeon: Christiana Andersen MD;  Location:  OR     STRIP VEIN  11/2010     TONSILLECTOMY  1974      Allergies   Allergen Reactions     Imitrex [Sumatriptan] Other (See Comments)     Throat tightness      Social History     Tobacco Use     Smoking status: Never     Smokeless tobacco: Never   Substance Use Topics     Alcohol use: Yes     Alcohol/week: 0.0 standard drinks     Comment: Socially      Wt Readings from Last 1 Encounters:   12/28/22 98.2 kg (216 lb 6.4 oz)        Anesthesia Evaluation            ROS/MED HX  ENT/Pulmonary:  - neg pulmonary ROS     Neurologic:  - neg neurologic ROS     Cardiovascular:     (+) hypertension----- (-) CAD   METS/Exercise Tolerance:     Hematologic:       Musculoskeletal: Comment: Chronic spine pain    (+) arthritis,     GI/Hepatic:  - neg GI/hepatic ROS     Renal/Genitourinary:  - neg Renal ROS     Endo:     (+) thyroid problem, hypothyroidism, Obesity,  (-) Type II DM   Psychiatric/Substance Use: Comment: PTSD    (+) psychiatric history anxiety and depression     Infectious Disease:       Malignancy:       Other:      (+) , H/O Chronic Pain,        Physical Exam    Airway        Mallampati: II       Respiratory Devices and Support         Dental  no notable dental history         Cardiovascular   cardiovascular exam normal       Rhythm and rate: regular     Pulmonary   pulmonary exam normal                 OUTSIDE LABS:  CBC:   Lab Results   Component Value Date    WBC 7.1 10/05/2020    WBC 9.5 05/03/2019    HGB 14.6 10/05/2020    HGB 13.5 05/03/2019    HCT 43.5 10/05/2020    HCT 40.6 05/03/2019     10/05/2020     05/03/2019     BMP:   Lab Results   Component Value Date     01/11/2022     10/05/2020    POTASSIUM 4.1 01/11/2022    POTASSIUM 4.3 10/05/2020    CHLORIDE 106 01/11/2022    CHLORIDE 107 10/05/2020    CO2 28 01/11/2022    CO2 26 10/05/2020    BUN 21 01/11/2022    BUN 17 10/05/2020    CR 0.76 01/11/2022    CR 0.77 10/05/2020    GLC 84 01/11/2022    GLC 88 10/05/2020     COAGS: No results found for: PTT, INR, FIBR  POC:   Lab Results   Component Value Date    BGM 92 04/03/2015    HCG Negative 04/09/2014    HCGS Negative 03/03/2006     HEPATIC:   Lab Results   Component Value Date    ALBUMIN 3.3 (L) 01/11/2022    PROTTOTAL 6.5 (L) 01/11/2022    ALT 27 01/11/2022    AST 18 01/11/2022    ALKPHOS 71 01/11/2022    BILITOTAL 0.5 01/11/2022     OTHER:   Lab Results   Component Value Date    A1C 5.2 07/21/2012    ASNHUL 9.5 01/11/2022    MAG 2.2 03/03/2006    TSH 0.38 12/13/2022    T4 1.24 04/30/2018       Anesthesia Plan    ASA Status:  2   NPO Status:  NPO Appropriate    Anesthesia Type: General.     - Airway: LMA   Induction: Intravenous, Propofol.   Maintenance: Balanced.        Consents    Anesthesia Plan(s) and associated risks, benefits, and realistic alternatives discussed. Questions answered and patient/representative(s) expressed understanding.    - Discussed:     - Discussed with:  Patient      - Extended Intubation/Ventilatory Support Discussed: No.      - Patient is DNR/DNI Status: No    Use of blood products discussed: No .     Postoperative Care    Pain management: Multi-modal analgesia, Peripheral nerve block (Single Shot).   PONV prophylaxis: Ondansetron (or other 5HT-3), Dexamethasone or Solumedrol     Comments:    Other Comments: Patient is counseled on the anesthesia  plan and relevant anesthesia procedures including all risks and benefits. All patient questions were answered.             Martin Murillo MD

## 2022-12-28 NOTE — INTERVAL H&P NOTE
"I have reviewed the surgical (or preoperative) H&P that is linked to this encounter, and examined the patient. There are no significant changes    Clinical Conditions Present on Arrival:  Clinically Significant Risk Factors Present on Admission                    # Obesity: Estimated body mass index is 34.93 kg/m  as calculated from the following:    Height as of this encounter: 1.676 m (5' 6\").    Weight as of this encounter: 98.2 kg (216 lb 6.4 oz).       "

## 2022-12-28 NOTE — BRIEF OP NOTE
[facility]    Brief Operative Note    Pre-operative diagnosis: Peroneal tendonitis, left [M76.72]  Post-operative diagnosis sp left peroneus longus tendon repair    Procedure: Procedure(s):  LEFT PERONEAL TENDON REPAIR  Surgeon: Surgeon(s) and Role:     * Nick Beebe DPM - Primary  Anesthesia: General with Block   Estimated Blood Loss: 1 mL from 12/28/2022 10:52 AM to 12/28/2022 12:02 PM      Drains: None  Specimens: * No specimens in log *  Findings:   2 isolated thickened areas of the Peroneus longus tendon.  Peroneus brevis tendon appeared unremarkable..  Complications: None.  Implants: * No implants in log *

## 2022-12-28 NOTE — ANESTHESIA CARE TRANSFER NOTE
Patient: Christine Dubose    Procedure: Procedure(s):  LEFT PERONEAL TENDON REPAIR       Diagnosis: Peroneal tendonitis, left [M76.72]  Diagnosis Additional Information: No value filed.    Anesthesia Type:   General     Note:    Oropharynx: spontaneously breathing and oropharynx clear of all foreign objects  Level of Consciousness: awake  Oxygen Supplementation: face mask  Level of Supplemental Oxygen (L/min / FiO2): 8 LPM  Independent Airway: airway patency satisfactory and stable  Dentition: dentition unchanged  Vital Signs Stable: post-procedure vital signs reviewed and stable  Report to RN Given: handoff report given  Patient transferred to: PACU    Handoff Report: Identifed the Patient, Identified the Reponsible Provider, Reviewed the pertinent medical history, Discussed the surgical course, Reviewed Intra-OP anesthesia mangement and issues during anesthesia, Set expectations for post-procedure period and Allowed opportunity for questions and acknowledgement of understanding      Vitals:  Vitals Value Taken Time   /91 12/28/22 1204   Temp     Pulse 96 12/28/22 1208   Resp 9 12/28/22 1208   SpO2 100 % 12/28/22 1208   Vitals shown include unvalidated device data.    Electronically Signed By: NICHOLAS Chirinos CRNA  December 28, 2022  12:10 PM

## 2023-01-05 ENCOUNTER — OFFICE VISIT (OUTPATIENT)
Dept: PODIATRY | Facility: CLINIC | Age: 53
End: 2023-01-05
Payer: COMMERCIAL

## 2023-01-05 VITALS
DIASTOLIC BLOOD PRESSURE: 74 MMHG | WEIGHT: 216 LBS | BODY MASS INDEX: 34.72 KG/M2 | SYSTOLIC BLOOD PRESSURE: 130 MMHG | HEIGHT: 66 IN

## 2023-01-05 DIAGNOSIS — M76.72 PERONEAL TENDONITIS, LEFT: Primary | ICD-10-CM

## 2023-01-05 DIAGNOSIS — Z98.890 S/P PERONEAL TENDON REPAIR: ICD-10-CM

## 2023-01-05 PROCEDURE — 99024 POSTOP FOLLOW-UP VISIT: CPT | Performed by: PODIATRIST

## 2023-01-05 NOTE — LETTER
1/5/2023         RE: Christine Dubose  [address] [facility] [address]        Dear Colleague,    Thank you for referring your patient, Christine Dubose, to the [facility]. Please see a copy of my visit note below.    Foot & Ankle Surgery  January 5, 2023    S:  Patient in today sp left lower extremity peroneal tendon repair on 12/28/2022.  Pain levels minimal.  She has needed minimal pain medication.  She states she has been occasionally putting weight on the foot for balance and has been doing daily dressing changes recently as she noticed increase in drainage after surgery.  The ankle is otherwise doing well    LMP 03/17/2014       ROS - positive for CC.  Patient denies current nausea, vomiting, chills, fevers, belly pain, calf pain, chest pain or SOB.  Complete remainder of ROS is otherwise neg.    PE -Staples intact (the second to last staple has come loose and the patient requests removal).  Skin margins are otherwise well coapted.  There is mild swelling along the surgical site but within normal limits for the stage postop.  Skin shows no trophic, color or temperature changes otherwise.  No calf redness, swelling or pain noted otherwise.      A/P - 52 year old yo patient approx 1 week sp above procedure  -We reviewed the procedure and intraoperative findings.  The peroneal brevis tendon appeared to be healthy without areas of thickening or degeneration.  There were 2 isolated areas of thickening/degeneration at the longus tendon.  There was slightly above average amount of fluid within the peroneal tendon sheath.  The base the wound was otherwise unremarkable  -A dressing change was performed today after the second to last staple was removed.  She should keep the dressing clean dry and intact  -I strongly advised against weightbearing on the foot as this can have a negative impact on healing.  -No pain medication refill request; no indication for oral  antibiotics    Follow up  -1 week or sooner with acute issues    Plan for [id number] -once healed sufficiently to resume job duties      Nick Beebe DPM [facility]  Podiatric Foot & Ankle Surgeon  [facility]  [phone]    Disclaimer: This note consists of symbols derived from keyboarding, dictation and/or voice recognition software. As a result, there may be errors in the script that have gone undetected. Please consider this when interpreting information found in this chart.          Again, thank you for allowing me to participate in the care of your patient.        Sincerely,        Nick Beebe DPM, DPM

## 2023-01-05 NOTE — PROGRESS NOTES
Foot & Ankle Surgery  January 5, 2023    S:  Patient in today sp left lower extremity peroneal tendon repair on 12/28/2022.  Pain levels minimal.  She has needed minimal pain medication.  She states she has been occasionally putting weight on the foot for balance and has been doing daily dressing changes recently as she noticed increase in drainage after surgery.  The ankle is otherwise doing well    LMP 03/17/2014       ROS - positive for CC.  Patient denies current nausea, vomiting, chills, fevers, belly pain, calf pain, chest pain or SOB.  Complete remainder of ROS is otherwise neg.    PE -Staples intact (the second to last staple has come loose and the patient requests removal).  Skin margins are otherwise well coapted.  There is mild swelling along the surgical site but within normal limits for the stage postop.  Skin shows no trophic, color or temperature changes otherwise.  No calf redness, swelling or pain noted otherwise.      A/P - 52 year old yo patient approx 1 week sp above procedure  -We reviewed the procedure and intraoperative findings.  The peroneal brevis tendon appeared to be healthy without areas of thickening or degeneration.  There were 2 isolated areas of thickening/degeneration at the longus tendon.  There was slightly above average amount of fluid within the peroneal tendon sheath.  The base the wound was otherwise unremarkable  -A dressing change was performed today after the second to last staple was removed.  She should keep the dressing clean dry and intact  -I strongly advised against weightbearing on the foot as this can have a negative impact on healing.  -No pain medication refill request; no indication for oral antibiotics    Follow up  -1 week or sooner with acute issues    Plan for [id number] -once healed sufficiently to resume job duties      Nick Beebe DPM [facility] FACFAOM  Podiatric Foot & Ankle Surgeon  [facility]  [phone]    Disclaimer: This  note consists of symbols derived from keyboarding, dictation and/or voice recognition software. As a result, there may be errors in the script that have gone undetected. Please consider this when interpreting information found in this chart.

## 2023-01-09 NOTE — PROGRESS NOTES
Bed: ED20  Expected date: 4/8/20  Expected time: 9:55 AM  Means of arrival: Ambulance  Comments:  724 52yo female, cough , short of breath, ETOH   Christine is a 52 year old  female who presents for annual exam.     Besides routine health maintenance, she has no other health concerns today .    HPI:  Here today for yearly exam --doing well.  S/p LASH in  and BSO with me in .  No vb/spotting.  Started on estradiol after her BSO and has done well. Occ warmth at night but no discrete night sweats.  +SA --no issues or concerns.  No bowel/bladder issues.  Denies leaking and urgency much improved with detrol LA.  Has done well with dose reduction in .  No JOLEEN or leaking.  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  Son  in [place] and younger two sons in [facility] --oldest in college and younger sophomore in   -not currently active due to left foot surgery but chomping at the bit to get back!!  +mammo today; +SBE --no issues  PCP -Prudencio Conde MD --follows meds, bloodwork, etc  -up to date on colonoscopy --will be due for repeat in   -had flu shot; declines covid vaccine      GYNECOLOGIC HISTORY:    Patient's last menstrual period was 2014.    Her current contraception method is: hysterectomy.  She  reports that she has never smoked. She has never used smokeless tobacco.    Patient is sexually active.  STD testing offered?  Declined  Last PHQ-9 score on record =   PHQ-9 SCORE 1/10/2023   PHQ-9 Total Score -   PHQ-9 Total Score MyChart -   PHQ-9 Total Score 3     Last GAD7 score on record =   JAYDE-7 SCORE 1/10/2023   Total Score -   Total Score -   Total Score 0     Alcohol Score = 1    HEALTH MAINTENANCE:  Cholesterol:   Recent Labs   Lab Test 22  1003 10/05/20  1030   CHOL 153 191   HDL 31* 34*   LDL 80 117*   TRIG 211* 201*       Last Mammo: One year ago, Result: Normal, Next Mammo: Today   Pap: (  Lab Results   Component Value Date    PAP NIL 2017    )  HPV Neg  Colonoscopy:  2020, Result: Normal,  family hx of polyps Next Colonoscopy: 2 years.  Dexa:  Never    Health maintenance updated:  Due-covid vaccine declined and pap smear    HISTORY:  OB History    Para Term  AB Living   5 4 3 1 1 4   SAB IAB Ectopic Multiple Live Births   1 0 0 0 4      # Outcome Date GA Lbr Amol/2nd Weight Sex Delivery Anes PTL Lv   5 Term 07 37w0d  3.118 kg (6 lb 14 oz) M    SHAN      Birth Comments: was on tegretol during preg for seizure disorder      Name: Garrick   4 Term 02 37w0d  2.892 kg (6 lb 6 oz) M    SHAN      Name: Tone   3 Term 99 40w0d  3.714 kg (8 lb 3 oz) F    SHAN   2  97 35w0d   M -SEC   SHAN      Birth Comments: breech      Name: Giancarlo Vizcarra SAB                Patient Active Problem List   Diagnosis     Hypertension goal BP (blood pressure) < 130/80     Osteoarthritis     Knee joint replacement by other means     History of arthroplasty of left knee     Posttraumatic stress disorder     Hypothyroidism, unspecified type     Arthritis of right hand     Arthralgia of right acromioclavicular joint     Surgical menopause on hormone replacement therapy     Urinary urgency     Past Surgical History:   Procedure Laterality Date     ARTHROPLASTY KNEE UNICOMPARTMENT Right 2015    Procedure: ARTHROPLASTY KNEE UNICOMPARTMENT;  Surgeon: Pablo Lara MD;  Location:  OR     ARTHROPLASTY KNEE UNICOMPARTMENT Left 10/27/2015    Procedure: ARTHROPLASTY KNEE UNICOMPARTMENT;  Surgeon: Pablo Lara MD;  Location:  OR      SECTION  1997    for breech     CYCTOCELE REPAIR  2010    sling     CYSTOSCOPY  2014    Procedure: CYSTOSCOPY;;  Surgeon: Candie Crandall MD;  Location:  SD     DECOMPRESSION LUMBAR ONE LEVEL Right 2015    Procedure: DECOMPRESSION LUMBAR ONE LEVEL;  Surgeon: Elio Starr MD;  Location:  OR     DILATION AND CURETTAGE, OPERATIVE HYSTEROSCOPY, COMBINED  2012    Procedure:COMBINED  DILATION AND CURETTAGE, OPERATIVE HYSTEROSCOPY; OPERATIVE HYSTEROSCOPY, DILATION AND CURETTAGE, POSSIBLE RESECTION OF POLYP; Surgeon:CANDIE CRANDALL; Location: SD     IR CERVICAL EPIDURAL STEROID INJECTION  11/28/2012     LAPAROSCOPIC HYSTERECTOMY SUPRACERVICAL  04/09/2014    Procedure: LAPAROSCOPIC HYSTERECTOMY SUPRACERVICAL;  LAPAROSCOPIC ASSISTED SUPRACERVICAL HYSTERECTOMY, cystoscopy;  Surgeon: Candie Crandall MD;  Location:  SD     LAPAROSCOPIC OOPHORECTOMY Bilateral 07/23/2020    Procedure: LAPAROSCOPIC BILATERAL OOPHORECTOMY;  Surgeon: Christiana Andersen MD;  Location:  OR     REPAIR TENDON ANKLE Left 12/28/2022    Procedure: LEFT PERONEAL TENDON REPAIR;  Surgeon: Nick Beebe DPM;  Location:  OR     STRIP VEIN  11/2010     TONSILLECTOMY  1974      Social History     Tobacco Use     Smoking status: Never     Smokeless tobacco: Never   Substance Use Topics     Alcohol use: Yes     Alcohol/week: 0.0 standard drinks     Comment: Socially      Problem (# of Occurrences) Relation (Name,Age of Onset)    Diabetes (4) Maternal Grandmother (Amy), Maternal Grandfather (Willis), Paternal Grandmother, Paternal Grandfather    Hypertension (4) Mother (Razia), Father (Remy), Maternal Grandmother (Amy), Paternal Grandmother    Osteoporosis (1) Maternal Grandmother (Amy)    Cerebrovascular Disease (1) Paternal Grandmother: TIA    Thyroid Disease (1) Mother (Raiza)    Hyperlipidemia (2) Mother (Raiza): high triglycerides, Father (Remy): not on meds    Fractures (1) Maternal Grandmother (Amy): Hip    Hypothyroidism (1) Daughter    Lung Cancer (1) Maternal Grandfather (Willis, 68): smoker    Stomach Cancer (1) Paternal Grandfather (68)    No Known Problems (3) Son, Son, Son       Negative family history of: Coronary Artery Disease, Breast Cancer, Colon Cancer            Current Outpatient Medications   Medication Sig     Calcium-Magnesium-Zinc 333-133-5 MG TABS  "Take 1 tablet by mouth At Bedtime     escitalopram (LEXAPRO) 20 MG tablet TAKE 1 TABLET(20 MG) BY MOUTH DAILY     estradiol (ESTRACE) 0.5 MG tablet TAKE 1 TABLET BY MOUTH DAILY     ibuprofen (ADVIL/MOTRIN) 600 MG tablet Take 600mg of ibuprofen every 6 hours x 7 days to assist in pain control.  If you are not tolerating the medication, you are ok to stop.     IRON-FOLIC ACID PO      KRILL OIL PO Take 1 capsule by mouth every evening      levothyroxine (SYNTHROID/LEVOTHROID) 137 MCG tablet TAKE 1 TABLET(137 MCG) BY MOUTH DAILY     lisinopril (ZESTRIL) 10 MG tablet Take 1 tablet (10 mg) by mouth daily     olopatadine (PATANOL) 0.1 % ophthalmic solution PLACE 1 DROP INTO BOTH EYES TWICE DAILY     Pediatric Multivit-Minerals-C (MULTIVITAMIN GUMMIES CHILDRENS PO) Take 2 chew tab by mouth daily as needed     rivaroxaban ANTICOAGULANT (XARELTO) 10 MG TABS tablet Take 1 tablet (10 mg) by mouth daily (with dinner)     tolterodine ER (DETROL LA) 2 MG 24 hr capsule TAKE 2 CAPSULES(4 MG) BY MOUTH DAILY     No current facility-administered medications for this visit.     Allergies   Allergen Reactions     Imitrex [Sumatriptan] Other (See Comments)     Throat tightness       Past medical, surgical, social and family histories were reviewed and updated in EPIC.    ROS:   12 point review of systems negative other than symptoms noted below or in the HPI.  No urinary frequency or dysuria, bladder or kidney problems    EXAM:  /66   Ht 1.676 m (5' 6\")   Wt 99.8 kg (220 lb)   LMP 03/17/2014   BMI 35.51 kg/m     BMI: Body mass index is 35.51 kg/m .    PHYSICAL EXAM:  Constitutional:   Appearance: Well nourished, well developed, alert, in no acute distress  Neck:  Lymph Nodes:  No lymphadenopathy present    Thyroid:  Gland size normal, nontender, no nodules or masses present  on palpation  Chest:  Respiratory Effort:  Breathing unlabored  Cardiovascular:    Heart: Auscultation:  Regular rate, normal rhythm, no murmurs " present  Breasts: Palpation of Breasts and Axillae:  No masses present on palpation, no breast tenderness., Axillary Lymph Nodes:  No lymphadenopathy present. and No nodularity, asymmetry or nipple discharge bilaterally.  Gastrointestinal:   Abdominal Examination:  Abdomen nontender to palpation, tone normal without rigidity or guarding, no masses present, umbilicus without lesions   Liver and Spleen:  No hepatomegaly present, liver nontender to palpation    Hernias:  No hernias present  Lymphatic: Lymph Nodes:  No other lymphadenopathy present  Skin:  General Inspection:  No rashes present, no lesions present, no areas of  discoloration  Neurologic:    Mental Status:  Oriented X3.  Normal strength and tone, sensory exam                grossly normal, mentation intact and speech normal.    Psychiatric:   Mentation appears normal and affect normal/bright.         Pelvic Exam:  External Genitalia:     Normal appearance for age, no discharge present, no tenderness present, no inflammatory lesions present, color normal  Vagina:     Normal vaginal vault without central or paravaginal defects, no discharge present, no inflammatory lesions present, no masses present  Bladder:     Nontender to palpation  Urethra:   Urethral Body:  Urethra palpation normal, urethra structural support normal   Urethral Meatus:  No erythema or lesions present  Cervix:    Appearance healthy, no lesions present, nontender to palpation, no bleeding present  Uterus:     Surgically absent  Adnexa:     Surgically absent  Perineum:     Perineum within normal limits, no evidence of trauma, no rashes or skin lesions present  Anus:     Anus within normal limits, no hemorrhoids present  Inguinal Lymph Nodes:     No lymphadenopathy present  Pubic Hair:     Normal pubic hair distribution for age  Genitalia and Groin:     No rashes present, no lesions present, no areas of discoloration, no masses present      COUNSELING:   Reviewed preventive health  counseling, as reflected in patient instructions  Special attention given to:        Regular exercise       Healthy diet/nutrition       Colorectal Cancer Screening       (Amanda)menopause management    BMI: Body mass index is 35.51 kg/m .  Weight management plan: Discussed healthy diet and exercise guidelines Patient was referred to their PCP to discuss a diet and exercise plan.    ASSESSMENT:  52 year old female with satisfactory annual exam.    ICD-10-CM    1. Encounter for gynecological examination without abnormal finding  Z01.419       2. Cervical cancer screening  Z12.4       3. Symptomatic menopausal or female climacteric states  N95.1       4. Urgency incontinence  N39.41           PLAN:  Patient Instructions   Follow up with your primary care provider for your other medical problems.  Continue self breast exam.  Increase physical activity and exercise.  Lab and pap smear results will be called to the patient.  Co-testing done today and will repeat in 5ys if normal.  Usual safety and preventative measures counseling done.  BMI >25  Weight loss encouraged.   WHI study was discussed in detail including current information.  The patient requests continuation of hormone therapy.       Christiana Andersen MD

## 2023-01-10 ENCOUNTER — ANCILLARY PROCEDURE (OUTPATIENT)
Dept: MAMMOGRAPHY | Facility: CLINIC | Age: 53
End: 2023-01-10
Payer: COMMERCIAL

## 2023-01-10 ENCOUNTER — OFFICE VISIT (OUTPATIENT)
Dept: OBGYN | Facility: CLINIC | Age: 53
End: 2023-01-10
Payer: COMMERCIAL

## 2023-01-10 VITALS
SYSTOLIC BLOOD PRESSURE: 110 MMHG | BODY MASS INDEX: 35.36 KG/M2 | HEIGHT: 66 IN | DIASTOLIC BLOOD PRESSURE: 66 MMHG | WEIGHT: 220 LBS

## 2023-01-10 DIAGNOSIS — N95.1 SYMPTOMATIC MENOPAUSAL OR FEMALE CLIMACTERIC STATES: ICD-10-CM

## 2023-01-10 DIAGNOSIS — Z12.31 VISIT FOR SCREENING MAMMOGRAM: ICD-10-CM

## 2023-01-10 DIAGNOSIS — N39.41 URGENCY INCONTINENCE: ICD-10-CM

## 2023-01-10 DIAGNOSIS — Z12.4 CERVICAL CANCER SCREENING: ICD-10-CM

## 2023-01-10 DIAGNOSIS — Z01.419 ENCOUNTER FOR GYNECOLOGICAL EXAMINATION WITHOUT ABNORMAL FINDING: Primary | ICD-10-CM

## 2023-01-10 PROCEDURE — 77067 SCR MAMMO BI INCL CAD: CPT | Mod: TC | Performed by: RADIOLOGY

## 2023-01-10 PROCEDURE — 99396 PREV VISIT EST AGE 40-64: CPT | Performed by: OBSTETRICS & GYNECOLOGY

## 2023-01-10 PROCEDURE — 77063 BREAST TOMOSYNTHESIS BI: CPT | Mod: TC | Performed by: RADIOLOGY

## 2023-01-10 PROCEDURE — G0145 SCR C/V CYTO,THINLAYER,RESCR: HCPCS | Performed by: OBSTETRICS & GYNECOLOGY

## 2023-01-10 PROCEDURE — 87624 HPV HI-RISK TYP POOLED RSLT: CPT | Performed by: OBSTETRICS & GYNECOLOGY

## 2023-01-10 RX ORDER — TOLTERODINE 2 MG/1
CAPSULE, EXTENDED RELEASE ORAL
Qty: 90 CAPSULE | Refills: 3 | Status: SHIPPED | OUTPATIENT
Start: 2023-01-10 | End: 2023-11-21

## 2023-01-10 RX ORDER — ESTRADIOL 0.5 MG/1
TABLET ORAL
Qty: 90 TABLET | Refills: 3 | Status: SHIPPED | OUTPATIENT
Start: 2023-01-10 | End: 2024-01-16

## 2023-01-10 ASSESSMENT — ANXIETY QUESTIONNAIRES
GAD7 TOTAL SCORE: 0
3. WORRYING TOO MUCH ABOUT DIFFERENT THINGS: NOT AT ALL
5. BEING SO RESTLESS THAT IT IS HARD TO SIT STILL: NOT AT ALL
2. NOT BEING ABLE TO STOP OR CONTROL WORRYING: NOT AT ALL
7. FEELING AFRAID AS IF SOMETHING AWFUL MIGHT HAPPEN: NOT AT ALL
GAD7 TOTAL SCORE: 0
1. FEELING NERVOUS, ANXIOUS, OR ON EDGE: NOT AT ALL
6. BECOMING EASILY ANNOYED OR IRRITABLE: NOT AT ALL
IF YOU CHECKED OFF ANY PROBLEMS ON THIS QUESTIONNAIRE, HOW DIFFICULT HAVE THESE PROBLEMS MADE IT FOR YOU TO DO YOUR WORK, TAKE CARE OF THINGS AT HOME, OR GET ALONG WITH OTHER PEOPLE: NOT DIFFICULT AT ALL

## 2023-01-10 ASSESSMENT — PATIENT HEALTH QUESTIONNAIRE - PHQ9
SUM OF ALL RESPONSES TO PHQ QUESTIONS 1-9: 3
5. POOR APPETITE OR OVEREATING: NOT AT ALL

## 2023-01-10 NOTE — PATIENT INSTRUCTIONS
Follow up with your primary care provider for your other medical problems.  Continue self breast exam.  Increase physical activity and exercise.  Lab and pap smear results will be called to the patient.  Co-testing done today and will repeat in 5ys if normal.  Usual safety and preventative measures counseling done.  BMI >25  Weight loss encouraged.   WHI study was discussed in detail including current information.  The patient requests continuation of hormone therapy.

## 2023-01-12 LAB
BKR LAB AP GYN ADEQUACY: NORMAL
BKR LAB AP GYN INTERPRETATION: NORMAL
BKR LAB AP HPV REFLEX: NORMAL
BKR LAB AP PREVIOUS ABNORMAL: NORMAL
PATH REPORT.COMMENTS IMP SPEC: NORMAL
PATH REPORT.COMMENTS IMP SPEC: NORMAL
PATH REPORT.RELEVANT HX SPEC: NORMAL

## 2023-01-13 ENCOUNTER — OFFICE VISIT (OUTPATIENT)
Dept: PODIATRY | Facility: CLINIC | Age: 53
End: 2023-01-13
Payer: COMMERCIAL

## 2023-01-13 VITALS — SYSTOLIC BLOOD PRESSURE: 118 MMHG | BODY MASS INDEX: 34.86 KG/M2 | WEIGHT: 216 LBS | DIASTOLIC BLOOD PRESSURE: 64 MMHG

## 2023-01-13 DIAGNOSIS — M76.72 PERONEAL TENDONITIS, LEFT: Primary | ICD-10-CM

## 2023-01-13 DIAGNOSIS — Z98.890 S/P PERONEAL TENDON REPAIR: ICD-10-CM

## 2023-01-13 PROCEDURE — 99024 POSTOP FOLLOW-UP VISIT: CPT | Performed by: PODIATRIST

## 2023-01-13 NOTE — LETTER
1/13/2023         RE: Christine Dubose  [address] [facility] [address]        Dear Colleague,    Thank you for referring your patient, Christine Dubose, to the [facility]. Please see a copy of my visit note below.    Foot & Ankle Surgery  January 13, 2023    S:  Patient in today sp left lower extremity peroneal tendon repair on 12/28/2022.  Pain levels minimal.  Following postop instructions.    /64   Wt 98 kg (216 lb)   LMP 03/17/2014   BMI 34.86 kg/m        ROS - positive for CC.  Patient denies current nausea, vomiting, chills, fevers, belly pain, calf pain, chest pain or SOB.  Complete remainder of ROS is otherwise neg.    PE -all staples were removed.  There is mild superficial gapping at the central portion.  No drainage or signs of infection.  Swelling levels are low.  Skin shows no trophic, color or temperature changes otherwise.  No calf redness, swelling or pain noted otherwise.      A/P - 52 year old yo patient approx 2 weeks sp above procedure  -All staples were removed.  Steri-Strips were applied to the central portion of the incision  -Postoperative instructions moving forward: 1.  Continue nonweightbearing; 2.  Continue DVT exercises; 3.  Continue compression with dispensed Tensogrip; 4.  Okay to increase activity levels to tolerance; 5.  Ice, elevate and rest as needed based on pain and swelling; 6.  Okay to wash the foot tomorrow but avoid soaking/submerging x1 week  -Remove Steri-Strips in 1 week if still present    Follow up  -4 weeks or sooner with acute issues    Plan for [id number] -once healed sufficiently to resume job duties      Nick Beebe DPM FACFAS FACFAOM  Podiatric Foot & Ankle Surgeon  [facility]  [phone]    Disclaimer: This note consists of symbols derived from keyboarding, dictation and/or voice recognition software. As a result, there may be errors in the script that have gone undetected. Please  consider this when interpreting information found in this chart.          Again, thank you for allowing me to participate in the care of your patient.        Sincerely,        Nick Beebe DPM, CHRISTINE

## 2023-01-13 NOTE — PROGRESS NOTES
Foot & Ankle Surgery  January 13, 2023    S:  Patient in today sp left lower extremity peroneal tendon repair on 12/28/2022.  Pain levels minimal.  Following postop instructions.    /64   Wt 98 kg (216 lb)   LMP 03/17/2014   BMI 34.86 kg/m        ROS - positive for CC.  Patient denies current nausea, vomiting, chills, fevers, belly pain, calf pain, chest pain or SOB.  Complete remainder of ROS is otherwise neg.    PE -all staples were removed.  There is mild superficial gapping at the central portion.  No drainage or signs of infection.  Swelling levels are low.  Skin shows no trophic, color or temperature changes otherwise.  No calf redness, swelling or pain noted otherwise.      A/P - 52 year old yo patient approx 2 weeks sp above procedure  -All staples were removed.  Steri-Strips were applied to the central portion of the incision  -Postoperative instructions moving forward: 1.  Continue nonweightbearing; 2.  Continue DVT exercises; 3.  Continue compression with dispensed Tensogrip; 4.  Okay to increase activity levels to tolerance; 5.  Ice, elevate and rest as needed based on pain and swelling; 6.  Okay to wash the foot tomorrow but avoid soaking/submerging x1 week  -Remove Steri-Strips in 1 week if still present    Follow up  -4 weeks or sooner with acute issues    Plan for [id number] -once healed sufficiently to resume job duties      Nick Beebe DPM FACFAS FACFAOM  Podiatric Foot & Ankle Surgeon  [facility]  [phone]    Disclaimer: This note consists of symbols derived from keyboarding, dictation and/or voice recognition software. As a result, there may be errors in the script that have gone undetected. Please consider this when interpreting information found in this chart.

## 2023-01-16 LAB
HUMAN PAPILLOMA VIRUS 16 DNA: NEGATIVE
HUMAN PAPILLOMA VIRUS 18 DNA: NEGATIVE
HUMAN PAPILLOMA VIRUS FINAL DIAGNOSIS: NORMAL
HUMAN PAPILLOMA VIRUS OTHER HR: NEGATIVE

## 2023-01-31 ENCOUNTER — OFFICE VISIT (OUTPATIENT)
Dept: PODIATRY | Facility: CLINIC | Age: 53
End: 2023-01-31
Payer: COMMERCIAL

## 2023-01-31 VITALS — DIASTOLIC BLOOD PRESSURE: 82 MMHG | SYSTOLIC BLOOD PRESSURE: 130 MMHG | WEIGHT: 216 LBS | BODY MASS INDEX: 34.86 KG/M2

## 2023-01-31 DIAGNOSIS — M76.72 PERONEAL TENDONITIS, LEFT: ICD-10-CM

## 2023-01-31 DIAGNOSIS — Z98.890 S/P PERONEAL TENDON REPAIR: ICD-10-CM

## 2023-01-31 DIAGNOSIS — L03.116 CELLULITIS OF LEFT ANKLE: Primary | ICD-10-CM

## 2023-01-31 PROCEDURE — 99024 POSTOP FOLLOW-UP VISIT: CPT | Performed by: PODIATRIST

## 2023-01-31 NOTE — LETTER
1/31/2023         RE: Christine Dubose  [address] [facility] [address]        Dear Colleague,    Thank you for referring your patient, Christine Dubose, to the [facility]. Please see a copy of my visit note below.    Foot & Ankle Surgery  January 31, 2023    S:  Patient in today sp left peroneal tendon repair on 12/28/22.  Pain levels elevatd at the surgical site.  She has concerns about wound healing and has noted a wound with redness/drainage.  Allergy to imitrex.      LMP 03/17/2014       ROS - positive for CC.  Patient denies current nausea, vomiting, chills, fevers, belly pain, calf pain, chest pain or SOB.  Complete remainder of ROS is otherwise neg.    PE -the incision is almost fully healed although there is a very small superficial open area over the lateral malleolus.  There is mild surrounding erythema.  This area is warm and tender to palpation.  Skin shows no trophic, color or temperature changes otherwise.  No calf redness, swelling or pain noted otherwise.    A/P - 52 year old yo patient approx 5 weeks sp above procedure  -The erythematous area was outlined.  She should see regression of the erythema from the outline  -Daily wound care: Wash thoroughly, dry thoroughly, antibiotic ointment and a Band-Aid.  The small area is quite small and almost fully epithelialized  -She is given a prescription for Augmentin 875 twice daily x7 days  -She has her 6-week postop appointment scheduled for next Tuesday and we will reassess the infection.  She was advised to go to the emergency department if the area deteriorates despite the above plan    Follow up  -1 week or sooner with acute issues    Plan for [id number] - currently working as Rose Mary Beebe, CHRISTINE FACFAS FACFAOM  Podiatric Foot & Ankle Surgeon  [facility]  [phone]    Disclaimer: This note consists of symbols derived from keyboarding, dictation and/or voice  recognition software. As a result, there may be errors in the script that have gone undetected. Please consider this when interpreting information found in this chart.          Again, thank you for allowing me to participate in the care of your patient.        Sincerely,        Nick Beebe DPM, CHRISTINE

## 2023-01-31 NOTE — PROGRESS NOTES
Foot & Ankle Surgery  January 31, 2023    S:  Patient in today sp left peroneal tendon repair on 12/28/22.  Pain levels elevatd at the surgical site.  She has concerns about wound healing and has noted a wound with redness/drainage.  Allergy to imitrex.      LMP 03/17/2014       ROS - positive for CC.  Patient denies current nausea, vomiting, chills, fevers, belly pain, calf pain, chest pain or SOB.  Complete remainder of ROS is otherwise neg.    PE -the incision is almost fully healed although there is a very small superficial open area over the lateral malleolus.  There is mild surrounding erythema.  This area is warm and tender to palpation.  Skin shows no trophic, color or temperature changes otherwise.  No calf redness, swelling or pain noted otherwise.    A/P - 52 year old yo patient approx 5 weeks sp above procedure  -The erythematous area was outlined.  She should see regression of the erythema from the outline  -Daily wound care: Wash thoroughly, dry thoroughly, antibiotic ointment and a Band-Aid.  The small area is quite small and almost fully epithelialized  -She is given a prescription for Augmentin 875 twice daily x7 days  -She has her 6-week postop appointment scheduled for next Tuesday and we will reassess the infection.  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  [phone]    Disclaimer: This note consists of symbols derived from keyboarding, dictation and/or voice recognition software. As a result, there may be errors in the script that have gone undetected. Please consider this when interpreting information found in this chart.

## 2023-02-07 ENCOUNTER — OFFICE VISIT (OUTPATIENT)
Dept: PODIATRY | Facility: CLINIC | Age: 53
End: 2023-02-07
Payer: COMMERCIAL

## 2023-02-07 VITALS — WEIGHT: 216 LBS | BODY MASS INDEX: 34.86 KG/M2 | DIASTOLIC BLOOD PRESSURE: 78 MMHG | SYSTOLIC BLOOD PRESSURE: 110 MMHG

## 2023-02-07 DIAGNOSIS — Z98.890 S/P PERONEAL TENDON REPAIR: Primary | ICD-10-CM

## 2023-02-07 DIAGNOSIS — M76.72 PERONEAL TENDONITIS, LEFT: ICD-10-CM

## 2023-02-07 PROCEDURE — 99024 POSTOP FOLLOW-UP VISIT: CPT | Performed by: PODIATRIST

## 2023-02-07 NOTE — PROGRESS NOTES
Foot & Ankle Surgery  February 7, 2023    S:  Patient in today sp left peroneal tendon repair on 12/28/22.  Pain levels low.  She was seen 1 week ago with concerns of an infection, and was placed on Augment 875 BID x 7 days.      LMP 03/17/2014       ROS - positive for CC.  Patient denies current nausea, vomiting, chills, fevers, belly pain, calf pain, chest pain or SOB.  Complete remainder of ROS is otherwise neg.    PE -the incision is now fully healed.  The cellulitis has resolved.  She does have mild discomfort along the peroneal tendons posterior to the fibula with resisted eversion and with resisted first ray plantarflexion.  No subluxation or acute pathology is otherwise seen.  Skin shows no trophic, color or temperature changes otherwise.  No calf redness, swelling or pain noted otherwise.      A/P - 52 year old yo patient approx 6 weeks sp above procedure  -The patient is cleared to start protected weightbearing in the boot with crutches.  Over the following 4 weeks, she will gradually increase weight on the foot to tolerance with care to avoid exceeding pain threshold at the surgical site with either the amount of time or weight on the foot as this can have a negative impact on healing  -[facility] PT referral to start musculoskeletal rehab    Follow up  -4 weeks or sooner with acute issues    Plan for [id number] - once healed sufficiently to resume job duties       Nick Beebe DPM FACFAS FACFAOM  Podiatric Foot & Ankle Surgeon  [facility]  [phone]    Disclaimer: This note consists of symbols derived from keyboarding, dictation and/or voice recognition software. As a result, there may be errors in the script that have gone undetected. Please consider this when interpreting information found in this chart.

## 2023-02-12 NOTE — PATIENT INSTRUCTIONS
Will try slightly lower dose of the detrol LA to see if we can improve side effects of dry mouth/dry eyes and make urinary hesitancy better.  If urgency returns, will return to 4mg dosing but possibly every other day.  If still having side effects with lower dose, may consider different anticholinergic.  If all improve, will follow up with me for yearly exam when due.   PAST SURGICAL HISTORY:  No significant past surgical history

## 2023-03-02 ENCOUNTER — THERAPY VISIT (OUTPATIENT)
Dept: PHYSICAL THERAPY | Facility: CLINIC | Age: 53
End: 2023-03-02
Attending: PODIATRIST
Payer: COMMERCIAL

## 2023-03-02 ENCOUNTER — LAB (OUTPATIENT)
Dept: LAB | Facility: CLINIC | Age: 53
End: 2023-03-02
Payer: COMMERCIAL

## 2023-03-02 DIAGNOSIS — Z98.890 S/P PERONEAL TENDON REPAIR: ICD-10-CM

## 2023-03-02 DIAGNOSIS — E89.40 SURGICAL MENOPAUSE ON HORMONE REPLACEMENT THERAPY: Primary | ICD-10-CM

## 2023-03-02 DIAGNOSIS — M76.72 PERONEAL TENDONITIS, LEFT: ICD-10-CM

## 2023-03-02 DIAGNOSIS — Z13.220 SCREENING FOR HYPERLIPIDEMIA: ICD-10-CM

## 2023-03-02 DIAGNOSIS — Z79.890 SURGICAL MENOPAUSE ON HORMONE REPLACEMENT THERAPY: Primary | ICD-10-CM

## 2023-03-02 DIAGNOSIS — I10 HYPERTENSION GOAL BP (BLOOD PRESSURE) < 130/80: ICD-10-CM

## 2023-03-02 DIAGNOSIS — M25.572 PAIN IN JOINT INVOLVING ANKLE AND FOOT, LEFT: ICD-10-CM

## 2023-03-02 LAB
ALBUMIN SERPL BCG-MCNC: 4.1 G/DL (ref 3.5–5.2)
ALP SERPL-CCNC: 65 U/L (ref 35–104)
ALT SERPL W P-5'-P-CCNC: 11 U/L (ref 10–35)
ANION GAP SERPL CALCULATED.3IONS-SCNC: 8 MMOL/L (ref 7–15)
AST SERPL W P-5'-P-CCNC: 28 U/L (ref 10–35)
BILIRUB SERPL-MCNC: 0.5 MG/DL
BUN SERPL-MCNC: 27.1 MG/DL (ref 6–20)
CALCIUM SERPL-MCNC: 10.5 MG/DL (ref 8.6–10)
CHLORIDE SERPL-SCNC: 101 MMOL/L (ref 98–107)
CHOLEST SERPL-MCNC: 198 MG/DL
CREAT SERPL-MCNC: 0.7 MG/DL (ref 0.51–0.95)
DEPRECATED HCO3 PLAS-SCNC: 28 MMOL/L (ref 22–29)
ERYTHROCYTE [DISTWIDTH] IN BLOOD BY AUTOMATED COUNT: 12.1 % (ref 10–15)
GFR SERPL CREATININE-BSD FRML MDRD: >90 ML/MIN/1.73M2
GLUCOSE SERPL-MCNC: 93 MG/DL (ref 70–99)
HCT VFR BLD AUTO: 43 % (ref 35–47)
HDLC SERPL-MCNC: 40 MG/DL
HGB BLD-MCNC: 14.9 G/DL (ref 11.7–15.7)
LDLC SERPL CALC-MCNC: 136 MG/DL
MCH RBC QN AUTO: 30 PG (ref 26.5–33)
MCHC RBC AUTO-ENTMCNC: 34.7 G/DL (ref 31.5–36.5)
MCV RBC AUTO: 87 FL (ref 78–100)
NONHDLC SERPL-MCNC: 158 MG/DL
PLATELET # BLD AUTO: 202 10E3/UL (ref 150–450)
POTASSIUM SERPL-SCNC: 4.1 MMOL/L (ref 3.4–5.3)
PROT SERPL-MCNC: 6.7 G/DL (ref 6.4–8.3)
RBC # BLD AUTO: 4.97 10E6/UL (ref 3.8–5.2)
SODIUM SERPL-SCNC: 137 MMOL/L (ref 136–145)
TRIGL SERPL-MCNC: 110 MG/DL
WBC # BLD AUTO: 5.6 10E3/UL (ref 4–11)

## 2023-03-02 PROCEDURE — 36415 COLL VENOUS BLD VENIPUNCTURE: CPT

## 2023-03-02 PROCEDURE — 97110 THERAPEUTIC EXERCISES: CPT | Mod: GP | Performed by: PHYSICAL THERAPIST

## 2023-03-02 PROCEDURE — 97161 PT EVAL LOW COMPLEX 20 MIN: CPT | Mod: GP | Performed by: PHYSICAL THERAPIST

## 2023-03-02 PROCEDURE — 80061 LIPID PANEL: CPT

## 2023-03-02 PROCEDURE — 85027 COMPLETE CBC AUTOMATED: CPT

## 2023-03-02 PROCEDURE — 80053 COMPREHEN METABOLIC PANEL: CPT

## 2023-03-02 NOTE — PROGRESS NOTES
Physical Therapy Initial Evaluation  Subjective:  The history is provided by the patient. No  was used.   Patient Health History  Christine Dubose being seen for Evaluation of ankle post surgery.     Problem began: 12/28/2022.   Problem occurred: Walking   Pain is reported as 1/10 on pain scale.  General health as reported by patient is good.       Medical allergies: none. Other medical allergies details: Imitrex.   Surgeries include:  Orthopedic surgery and other. Other surgery history details: Hysterectomy, Bladder Repair,.    Current medications:  Anti-depressants, anti-inflammatory, high blood pressure medication, hormone replacement therapy and thyroid medication.    Current occupation is Homemaker.   Primary job tasks include:  Computer work, driving, lifting/carrying, prolonged standing and repetitive tasks.                  Therapist Generated HPI Evaluation  Problem details: The patient reports to therapy s/p L peroneal tendon repair that took place on 12/28/22.  She reports that recovery has been going well so far. She denies much pain, but does feel the ankle is stiff. She does admit that she has not been as consistent with her boot wearing and has been without the boot at home as tolerated. Her goal is to improve her gait ability so she can go on a spring break trip at the end of the month. .         Type of problem:  Left foot and left ankle.    This is a new condition.  Condition occurred with:  Degenerative joint disease.      Pain is described as aching and is intermittent.  Pain is the same all the time.  Since onset symptoms are gradually improving.  Associated symptoms:  Loss of motion/stiffness and loss of strength. Symptoms are exacerbated by walking, descending stairs, ascending stairs, standing, bending/squatting and weight bearing  and relieved by rest and bracing/immobilizing.  Special tests included:  X-ray and MRI.  Previous treatment includes surgery. There was  moderate improvement following previous treatment.  Restrictions due to condition include:  Working in normal job without restrictions.  Barriers include:  None as reported by patient.                        Objective:    Gait:    Gait Type:  Antalgic   Assistive Devices:  CAM            Ankle/Foot Evaluation  ROM:    AROM:    Dorsiflexion: Left:    Right:   5 deg   Plantarflexion: Left:     Right:  50 deg mild discomfort   Inversion: Left:      Right:  Excessive Inversion noted painfree  Eversion:     Right:  10 deg mild discomofrt lateral ankle       PROM:    Dorsiflexion: Left:        Right:   10 deg                 Strength:    Dorsiflexion:  Left: 4+/5     Pain:   Right: 4+/5    Pain:+  Plantarflexion: Left: 4+/5   Pain:   Right: 4+/5  Pain:  Inversion:Left: 4+/5  Pain:     Right: 4+/5  Pain:  Eversion:Left: 4+/5  Pain:  Right: 4+/5   Pain:+                    SPECIAL TESTS: normal    PALPATION:     Right ankle tenderness present at:   achilles tendon and incisional  EDEMA: normal                                                              General     ROS    Assessment/Plan:    Patient is a 52 year old female with left side ankle complaints.    Patient has the following significant findings with corresponding treatment plan.                Diagnosis 1:  S/P Left peroneal Tendon Repair  Pain -  hot/cold therapy, US, electric stimulation, manual therapy, directional preference exercise and home program  Decreased ROM/flexibility - manual therapy and therapeutic exercise  Decreased strength - therapeutic exercise and therapeutic activities  Impaired balance - neuro re-education and therapeutic activities  Decreased proprioception - neuro re-education and therapeutic activities  Inflammation - cold therapy, US and electric stimulation  Impaired gait - gait training  Impaired muscle performance - neuro re-education    Cumulative Therapy Evaluation is: Low complexity.    Previous and current functional limitations:   (See Goal Flow Sheet for this information)    Short term and Long term goals: (See Goal Flow Sheet for this information)     Communication ability:  Patient appears to be able to clearly communicate and understand verbal and written communication and follow directions correctly.  Treatment Explanation - The following has been discussed with the patient:   RX ordered/plan of care  Anticipated outcomes  Possible risks and side effects  This patient would benefit from PT intervention to resume normal activities.   Rehab potential is good.    Frequency:  1 X week, once daily  Duration:  for 8 weeks  Discharge Plan:  Achieve all LTG.  Independent in home treatment program.  Reach maximal therapeutic benefit.    Please refer to the daily flowsheet for treatment today, total treatment time and time spent performing 1:1 timed codes.

## 2023-03-03 PROBLEM — M25.572 PAIN IN JOINT INVOLVING ANKLE AND FOOT, LEFT: Status: ACTIVE | Noted: 2023-03-03

## 2023-03-03 PROBLEM — M25.571 PAIN IN JOINT INVOLVING ANKLE AND FOOT, RIGHT: Status: ACTIVE | Noted: 2023-03-03

## 2023-03-03 NOTE — RESULT ENCOUNTER NOTE
Dear Candie,    Here is a summary of your recent test results:  -Normal red blood cell (hgb) levels, normal white blood cell count and normal platelet levels.  -Lipid panel: The ASCVD risk score returns the percentage likelihood of a first time Atherosclerotic Cardiovascular Disease (ASCVD) event.    The 10-year ASCVD risk score (Jacinta ADAMS, et al., 2019) is: 2.1%    Values used to calculate the score:      Age: 52 years      Sex: Female      Is Non- : No      Diabetic: No      Tobacco smoker: No      Systolic Blood Pressure: 110 mmHg      Is BP treated: Yes      HDL Cholesterol: 40 mg/dL      Total Cholesterol: 198 mg/dL    -2.1% of patients that have a similar cholesterol profile to you will have a stroke, heart attack or death (related to heart disease) within the next 10 years and that is considered a low risk and cholesterol lowering medications are not  recommended at this time (high risk is >10%, or >7.5% if other risk factors such has high blood pressure or other heart disease risk factors).    -Cholesterol levels (LDL,HDL, Triglycerides) are okay.  ADVISE: rechecking  in 1 year.  -Liver and gallbladder tests (ALT,AST, Alk phos,bilirubin) are normal.  -Kidney function (GFR) is normal.  -Sodium is normal.  -Potassium is normal.  -Calcium is elevated.  ADVISE: rechecking this in 1 month.  -Glucose (diabetic screening test) is normal.    For additional lab test information, www.testing.com is a very good reference.      Thank you very much for trusting me and [facility].     Have a peaceful day.    Healthy regards,  Sonny Conde MD

## 2023-03-03 NOTE — PROGRESS NOTES
[facility]    OUTPATIENT Physical Therapy ORTHOPEDIC EVALUATION  PLAN OF TREATMENT FOR OUTPATIENT REHABILITATION  (COMPLETE FOR INITIAL CLAIMS ONLY)  Patient's Last Name, First Name, M.I.  YOB: 1970  Christine Dubose    Provider s Name:  [facility]   Medical Record No.  [id number]   Start of Care Date:  03/02/23   Onset Date:  12/28/2212/28/22   Treatment Diagnosis:  S/P peroneal tendon repair Medical Diagnosis:     S/P peroneal tendon repair  Peroneal tendonitis, left  Pain in joint involving ankle and foot, right       Goals:     03/03/23 0500   Body Part   Goals listed below are for ankle   Goal #1   Goal #1 ambulation   Previous Functional Level No restrictions   Current Functional Level Minutes patient can walk   Performance Level CAM boot   STG Target Performance Minutes patient will be able to walk   Performance Level 10 minutes no boot pain <3/10   Rationale for safe household ambulation;for safe outdoor household ambulation;for safe community ambulation;to maintain proper body mechanics/posture while ambulating to avoid additional compensatory injury due to improper gait mechanics;to promote a healthy and active lifestyle   Due Date 03/31/23    LTG Target Performance Minutes patient will be able to  walk   Performance Level >60 minutes pain <4/10   Rationale for safe outdoor household ambulation;for safe household ambulation;for safe community ambulation;to maintain proper body mechanics/posture while ambulating to avoid additional compensatory injury due to improper gait mechanics;to promote a healthy and active lifestyle   Due Date 04/28/23         Therapy Frequency:  1x/week  Predicted Duration of Therapy Intervention:  8 weeks    SHARI OLVERA, PT                 I CERTIFY THE NEED FOR THESE SERVICES FURNISHED UNDER        THIS PLAN OF TREATMENT  AND WHILE UNDER MY CARE     (Physician attestation of this document indicates review and certification of the therapy plan).                     Certification Date From:  03/02/23   Certification Date To:  04/28/23    Referring Provider:  Nick Beebe    Initial Assessment        See Epic Evaluation SOC Date: 03/02/23

## 2023-03-06 ENCOUNTER — OFFICE VISIT (OUTPATIENT)
Dept: PODIATRY | Facility: CLINIC | Age: 53
End: 2023-03-06
Payer: COMMERCIAL

## 2023-03-06 VITALS
DIASTOLIC BLOOD PRESSURE: 76 MMHG | HEIGHT: 66 IN | WEIGHT: 216 LBS | SYSTOLIC BLOOD PRESSURE: 112 MMHG | BODY MASS INDEX: 34.72 KG/M2

## 2023-03-06 DIAGNOSIS — M76.72 PERONEAL TENDONITIS, LEFT: Primary | ICD-10-CM

## 2023-03-06 DIAGNOSIS — Z98.890 S/P PERONEAL TENDON REPAIR: ICD-10-CM

## 2023-03-06 PROCEDURE — 99024 POSTOP FOLLOW-UP VISIT: CPT | Performed by: PODIATRIST

## 2023-03-06 NOTE — PROGRESS NOTES
"Foot & Ankle Surgery  March 6, 2023    S:  Patient in today sp left peroneus longus tendon debridement and repair on 12/28/2022.  Pain levels \"none.  She was last seen on 2/7/2023 and cleared to start protected weightbearing in the boot with crutches.  She was advised to slowly increase weight on the foot to tolerance with care to avoid exceeding the pain threshold at the surgical site with either the amount of time or weight on the foot as this can have a negative impact on healing.  She was also referred to physical therapy to start musculoskeletal functional rehab.  She presents today full weightbearing in the boot without weightbearing assistive device and without pain.  The patient indicates she has been walking around the house outside the boot with minimal discomfort.    LMP 03/17/2014       ROS - positive for CC.  Patient denies current nausea, vomiting, chills, fevers, belly pain, calf pain, chest pain or SOB.  Complete remainder of ROS is otherwise neg.    PE -there is mild discomfort with resisted eversion of the foot but the peroneal tendon exam is otherwise unremarkable.  There is no subluxation noted.  Strength is improving..  Skin shows no trophic, color or temperature changes otherwise.  No calf redness, swelling or pain noted otherwise.    A/P - 52 year old yo patient approx 10 weeks sp above procedure  -The patient is cleared to transition from the walking cast boot to comfortable shoes (use an ankle brace, has at home) as tolerated with care to avoid exceeding the pain threshold at the surgical site with either the amount of time or weight on the foot as this can have a negative impact on healing.  The patient states she has been walking around at home outside the boot already  -RICE/NSAID versus Tylenol as needed based on pain  -Finish PT, continue home exercises.  She is only done one PT session so far  -She is traveling to [place] at the end of  March.  I advised bring the brace and utilizing " when on her feet for extended periods of time    Follow up  -3 months or sooner with acute issues          Nick Beebe DPM FACFAS FACFAOM  Podiatric Foot & Ankle Surgeon  [facility]  [phone]    Disclaimer: This note consists of symbols derived from keyboarding, dictation and/or voice recognition software. As a result, there may be errors in the script that have gone undetected. Please consider this when interpreting information found in this chart.

## 2023-03-06 NOTE — LETTER
"    3/6/2023         RE: Christine Dubose  [address] [place] [facility] [address]        Dear Colleague,    Thank you for referring your patient, Christine Dubose, to the [facility]. Please see a copy of my visit note below.    Foot & Ankle Surgery  March 6, 2023    S:  Patient in today sp left peroneus longus tendon debridement and repair on 12/28/2022.  Pain levels \"none.  She was last seen on 2/7/2023 and cleared to start protected weightbearing in the boot with crutches.  She was advised to slowly increase weight on the foot to tolerance with care to avoid exceeding the pain threshold at the surgical site with either the amount of time or weight on the foot as this can have a negative impact on healing.  She was also referred to physical therapy to start musculoskeletal functional rehab.  She presents today full weightbearing in the boot without weightbearing assistive device and without pain.  The patient indicates she has been walking around the house outside the boot with minimal discomfort.    LMP 03/17/2014       ROS - positive for CC.  Patient denies current nausea, vomiting, chills, fevers, belly pain, calf pain, chest pain or SOB.  Complete remainder of ROS is otherwise neg.    PE -there is mild discomfort with resisted eversion of the foot but the peroneal tendon exam is otherwise unremarkable.  There is no subluxation noted.  Strength is improving..  Skin shows no trophic, color or temperature changes otherwise.  No calf redness, swelling or pain noted otherwise.    A/P - 52 year old yo patient approx 10 weeks sp above procedure  -The patient is cleared to transition from the walking cast boot to comfortable shoes (use an ankle brace, has at home) as tolerated with care to avoid exceeding the pain threshold at the surgical site with either the amount of time or weight on the foot as this can have a negative impact on healing.  The patient states she has been " walking around at home outside the boot already  -RICE/NSAID versus Tylenol as needed based on pain  -Finish PT, continue home exercises.  She is only done one PT session so far  -She is traveling to [place] at the end of  March.  I advised bring the brace and utilizing when on her feet for extended periods of time    Follow up  -3 months or sooner with acute issues          Nick Beebe DPM [facility] FACFA  Podiatric Foot & Ankle Surgeon  [facility]  [phone]    Disclaimer: This note consists of symbols derived from keyboarding, dictation and/or voice recognition software. As a result, there may be errors in the script that have gone undetected. Please consider this when interpreting information found in this chart.          Again, thank you for allowing me to participate in the care of your patient.        Sincerely,        Nick Beebe DPM, DPMICHELLE

## 2023-03-08 ENCOUNTER — THERAPY VISIT (OUTPATIENT)
Dept: PHYSICAL THERAPY | Facility: CLINIC | Age: 53
End: 2023-03-08
Payer: COMMERCIAL

## 2023-03-08 DIAGNOSIS — M25.572 PAIN IN JOINT INVOLVING ANKLE AND FOOT, LEFT: Primary | ICD-10-CM

## 2023-03-08 DIAGNOSIS — F41.9 ANXIETY: ICD-10-CM

## 2023-03-08 DIAGNOSIS — M19.041 ARTHRITIS OF RIGHT HAND: ICD-10-CM

## 2023-03-08 PROCEDURE — 97110 THERAPEUTIC EXERCISES: CPT | Mod: GP | Performed by: PHYSICAL THERAPIST

## 2023-03-10 RX ORDER — MELOXICAM 15 MG/1
TABLET ORAL
Qty: 90 TABLET | Refills: 1 | Status: SHIPPED | OUTPATIENT
Start: 2023-03-10 | End: 2023-07-11

## 2023-03-10 RX ORDER — ESCITALOPRAM OXALATE 20 MG/1
TABLET ORAL
Qty: 90 TABLET | Refills: 1 | Status: SHIPPED | OUTPATIENT
Start: 2023-03-10 | End: 2023-07-11

## 2023-03-10 NOTE — TELEPHONE ENCOUNTER
Routing refill request to provider for review/approval because:  Drug not active on patient's medication list    FYI: just approved lexapro refill   Angy Powers RN

## 2023-03-14 ENCOUNTER — THERAPY VISIT (OUTPATIENT)
Dept: PHYSICAL THERAPY | Facility: CLINIC | Age: 53
End: 2023-03-14
Payer: COMMERCIAL

## 2023-03-14 DIAGNOSIS — M25.572 PAIN IN JOINT INVOLVING ANKLE AND FOOT, LEFT: Primary | ICD-10-CM

## 2023-03-14 PROCEDURE — 97112 NEUROMUSCULAR REEDUCATION: CPT | Mod: GP | Performed by: PHYSICAL THERAPIST

## 2023-03-14 PROCEDURE — 97110 THERAPEUTIC EXERCISES: CPT | Mod: GP | Performed by: PHYSICAL THERAPIST

## 2023-03-23 ENCOUNTER — THERAPY VISIT (OUTPATIENT)
Dept: PHYSICAL THERAPY | Facility: CLINIC | Age: 53
End: 2023-03-23
Payer: COMMERCIAL

## 2023-03-23 DIAGNOSIS — M25.572 PAIN IN JOINT INVOLVING ANKLE AND FOOT, LEFT: Primary | ICD-10-CM

## 2023-03-23 PROCEDURE — 97110 THERAPEUTIC EXERCISES: CPT | Mod: GP | Performed by: PHYSICAL THERAPIST

## 2023-03-23 NOTE — PROGRESS NOTES
PROGRESS  REPORT    Progress reporting period is from IE2 to 3/23/23.       SUBJECTIVE  Subjective: the patient reports that she is doing very well. She ins not experiencing much pain and feels good with all of her activities. Feels at this point she does not need to continue withPT.    Current pain level is 0/10  .     Previous pain level was  4/10  .   Changes in function:  Yes (See Goal flowsheet attached for changes in current functional level)  Adverse reaction to treatment or activity: None    OBJECTIVE  Changes noted in objective findings:  Yes  Objective: SL balance 30 seconds minimal LOB involved side. Ankle mobility min limited DF and PF, no gait abnormalities.     ASSESSMENT/PLAN  Updated problem list and treatment plan: Diagnosis 1:  S/P Peroneal tendon repair  Pain -  manual therapy and home program  Decreased strength - therapeutic exercise and therapeutic activities  Impaired balance - neuro re-education and therapeutic activities  STG/LTGs have been met or progress has been made towards goals:  Yes (See Goal flow sheet completed today.)  Assessment of Progress: The patient's condition is improving.  Self Management Plans:  Patient has been instructed in a home treatment program.  I have re-evaluated this patient and find that the nature, scope, duration and intensity of the therapy is appropriate for the medical condition of the patient.  Christine continues to require the following intervention to meet STG and LTG's:  PT    Recommendations:  This patient would benefit from continued therapy.     Frequency:  1 X a month, once daily  Duration:  for 1 months        Please refer to the daily flowsheet for treatment today, total treatment time and time spent performing 1:1 timed codes.

## 2023-05-01 PROBLEM — M25.572 PAIN IN JOINT INVOLVING ANKLE AND FOOT, LEFT: Status: RESOLVED | Noted: 2023-03-03 | Resolved: 2023-05-01

## 2023-05-15 ENCOUNTER — NURSE TRIAGE (OUTPATIENT)
Dept: FAMILY MEDICINE | Facility: CLINIC | Age: 53
End: 2023-05-15

## 2023-05-15 NOTE — TELEPHONE ENCOUNTER
Patient is calling to report that her stool color has changed to a tan/cream color since last Monday. There is no problem with consistency or going, stools are formed - it's just the color. Patient reports no change in her diet, or new medications. Please advise. Does patient need an appointment?  Patient prefers a callback to to patient cell phone.

## 2023-05-15 NOTE — TELEPHONE ENCOUNTER
Called patient about  Stool color change     The patient explains her stool has been a different from her normal and is wondering if there is any testing or would her pcp  recommend an appointment.       she states definitely  a different color than her normal and no lifestyle/medication changes.     advised can ask, explained typically is something si going on another sytpmos would present.   Advised can ask provider and get back to her.     Reason for Disposition    [1] Abnormal color is unexplained AND [2] persists > 24 hours    Additional Information    Negative: Rectal bleeding, bloody stools, or blood in stool (bowel movement)    Negative: Black or tarry bowel movements    Negative: [1] Stool color is black (not dark green) AND [2] patient hasn't swallowed substance that causes black stools (e.g., Pepto-Bismol, iron pills)    Negative: Diarrhea stools (i.e., watery stools, or increase in the frequency and looseness of stools)    Negative: [1] Abdomen pain is main symptom AND [2] male    Negative: [1] Abdomen pain is main symptom AND [2] adult female    Negative: Yellow eyes (jaundice)    Negative: Patient sounds very sick or weak to the triager    Negative: [1] Stool is light gray or whitish AND [2] unexplained    Negative: [1] Stool is mucus or pus AND [2] persists > 24 hours    Protocols used: STOOLS - UNUSUAL COLOR-A-

## 2023-05-16 NOTE — TELEPHONE ENCOUNTER
Attempt # 1    Called # [phone]     Left a non detailed VM to call back at [phone] and ask for any available Triage Nurse.    ADONIS NESBITT RN on 5/16/2023 at 9:42 AM   [facility]

## 2023-05-16 NOTE — TELEPHONE ENCOUNTER
Provider Response to 2nd Level Triage Request    I have reviewed the RN documentation. My recommendation is:  Continued observation and/or be seen if any symptoms develop.

## 2023-05-17 NOTE — TELEPHONE ENCOUNTER
Attempt #2  Called Phone # [phone]      Left a non detailed voicemail to please call back and ask for any available triage nurse regarding your phone call if you have questions after reviewing your [employer] message  @ [phone].     Sent [employer] message with providers recommendations    Mackenzie WYATT RN   [facility] Triage

## 2023-06-26 ENCOUNTER — OFFICE VISIT (OUTPATIENT)
Dept: PODIATRY | Facility: CLINIC | Age: 53
End: 2023-06-26
Payer: COMMERCIAL

## 2023-06-26 VITALS
BODY MASS INDEX: 34.72 KG/M2 | WEIGHT: 216 LBS | HEIGHT: 66 IN | DIASTOLIC BLOOD PRESSURE: 78 MMHG | SYSTOLIC BLOOD PRESSURE: 128 MMHG | HEART RATE: 86 BPM

## 2023-06-26 DIAGNOSIS — M76.72 PERONEAL TENDONITIS, LEFT: Primary | ICD-10-CM

## 2023-06-26 DIAGNOSIS — Z98.890 S/P PERONEAL TENDON REPAIR: ICD-10-CM

## 2023-06-26 PROCEDURE — 99213 OFFICE O/P EST LOW 20 MIN: CPT | Performed by: PODIATRIST

## 2023-06-26 NOTE — NURSING NOTE
"Chief Complaint   Patient presents with     Surgical Followup     Left foot- no concerns       Initial /78   Pulse 86   Ht 1.676 m (5' 6\")   Wt 98 kg (216 lb)   LMP 03/17/2014   BMI 34.86 kg/m   Estimated body mass index is 34.86 kg/m  as calculated from the following:    Height as of this encounter: 1.676 m (5' 6\").    Weight as of this encounter: 98 kg (216 lb).  Medications and allergies reviewed.      Ciera GRIFFIN MA    "

## 2023-06-26 NOTE — PROGRESS NOTES
"Foot & Ankle Surgery  June 26, 2023    S:  Patient in today sp left peroneus longus debridement and repair on 12/28/2022.  Pain levels 0.  She notices some numbness along the incision but otherwise is back to shoes and activities with no issues    /78   Pulse 86   Ht 1.676 m (5' 6\")   Wt 98 kg (216 lb)   LMP 03/17/2014   BMI 34.86 kg/m        ROS - positive for CC.  Patient denies current nausea, vomiting, chills, fevers, belly pain, calf pain, chest pain or SOB.  Complete remainder of ROS is otherwise neg.    PE -no pain with palpation or firing of the peroneal tendons.  Mild bc-incisional decreased sensation.  Skin shows no trophic, color or temperature changes otherwise.  No calf redness, swelling or pain noted otherwise.      A/P - 53 year old yo patient approx 6 months sp above procedure  -5-minute 3 times daily perpendicular massage along the incision in hopes of decreasing bulk, decreasing swelling (minimal swelling noted) and desensitizing the area in hopes of improving bc-incisional paresthesias/numbness.  -She is otherwise back to shoes and activities with no concerns.  She states she went to [employer] and walked at [employer] for 2 straight days with no issue.  -She has finished PT, and in fact states she was discharged early.  Continue home exercise program as she sees necessary; RICE/NSAID versus Tylenol as needed based on pain (no pain currently)  -No further follow-up or intervention is necessary at this point    Follow up  -as needed or sooner with acute issues         Nick Beebe DPM FACFAS FACFAOM  Podiatric Foot & Ankle Surgeon  [facility]  [phone]    Disclaimer: This note consists of symbols derived from keyboarding, dictation and/or voice recognition software. As a result, there may be errors in the script that have gone undetected. Please consider this when interpreting information found in this chart.      "

## 2023-07-11 ENCOUNTER — OFFICE VISIT (OUTPATIENT)
Dept: FAMILY MEDICINE | Facility: CLINIC | Age: 53
End: 2023-07-11
Payer: COMMERCIAL

## 2023-07-11 VITALS
TEMPERATURE: 97.8 F | DIASTOLIC BLOOD PRESSURE: 82 MMHG | BODY MASS INDEX: 36.8 KG/M2 | WEIGHT: 228 LBS | RESPIRATION RATE: 15 BRPM | HEART RATE: 72 BPM | OXYGEN SATURATION: 99 % | SYSTOLIC BLOOD PRESSURE: 118 MMHG

## 2023-07-11 DIAGNOSIS — M67.441 DIGITAL MUCINOUS CYST OF FINGER OF RIGHT HAND: Primary | ICD-10-CM

## 2023-07-11 DIAGNOSIS — E66.01 CLASS 2 SEVERE OBESITY DUE TO EXCESS CALORIES WITH SERIOUS COMORBIDITY IN ADULT, UNSPECIFIED BMI (H): ICD-10-CM

## 2023-07-11 DIAGNOSIS — F41.9 ANXIETY: ICD-10-CM

## 2023-07-11 DIAGNOSIS — M19.041 ARTHRITIS OF RIGHT HAND: ICD-10-CM

## 2023-07-11 DIAGNOSIS — E66.812 CLASS 2 SEVERE OBESITY DUE TO EXCESS CALORIES WITH SERIOUS COMORBIDITY IN ADULT, UNSPECIFIED BMI (H): ICD-10-CM

## 2023-07-11 PROCEDURE — 99213 OFFICE O/P EST LOW 20 MIN: CPT | Performed by: FAMILY MEDICINE

## 2023-07-11 RX ORDER — MELOXICAM 15 MG/1
15 TABLET ORAL DAILY
Qty: 90 TABLET | Refills: 1 | Status: SHIPPED | OUTPATIENT
Start: 2023-07-11 | End: 2024-02-29

## 2023-07-11 RX ORDER — ESCITALOPRAM OXALATE 20 MG/1
20 TABLET ORAL DAILY
Qty: 90 TABLET | Refills: 1 | Status: SHIPPED | OUTPATIENT
Start: 2023-07-11 | End: 2024-02-29

## 2023-07-11 ASSESSMENT — ANXIETY QUESTIONNAIRES
6. BECOMING EASILY ANNOYED OR IRRITABLE: NOT AT ALL
5. BEING SO RESTLESS THAT IT IS HARD TO SIT STILL: NOT AT ALL
IF YOU CHECKED OFF ANY PROBLEMS ON THIS QUESTIONNAIRE, HOW DIFFICULT HAVE THESE PROBLEMS MADE IT FOR YOU TO DO YOUR WORK, TAKE CARE OF THINGS AT HOME, OR GET ALONG WITH OTHER PEOPLE: NOT DIFFICULT AT ALL
4. TROUBLE RELAXING: NOT AT ALL
2. NOT BEING ABLE TO STOP OR CONTROL WORRYING: NOT AT ALL
GAD7 TOTAL SCORE: 0
1. FEELING NERVOUS, ANXIOUS, OR ON EDGE: NOT AT ALL
3. WORRYING TOO MUCH ABOUT DIFFERENT THINGS: NOT AT ALL
GAD7 TOTAL SCORE: 0
7. FEELING AFRAID AS IF SOMETHING AWFUL MIGHT HAPPEN: NOT AT ALL

## 2023-07-11 ASSESSMENT — PATIENT HEALTH QUESTIONNAIRE - PHQ9
SUM OF ALL RESPONSES TO PHQ QUESTIONS 1-9: 3
SUM OF ALL RESPONSES TO PHQ QUESTIONS 1-9: 3
10. IF YOU CHECKED OFF ANY PROBLEMS, HOW DIFFICULT HAVE THESE PROBLEMS MADE IT FOR YOU TO DO YOUR WORK, TAKE CARE OF THINGS AT HOME, OR GET ALONG WITH OTHER PEOPLE: NOT DIFFICULT AT ALL

## 2023-07-11 NOTE — PROGRESS NOTES
"  Assessment & Plan   Digital mucinous cyst of finger of right hand  Cyst was drained with 21-gauge needle demonstrating technique and bandage applied.  Can repeat drain if it recurs up to 5 times.  If persisting then refer to hand surgeon and she will contact me to let me know.    Class 2 severe obesity due to excess calories with serious comorbidity in adult, unspecified BMI (H)  Healthy diet and exercise.    Anxiety  Ongoing symptoms, medications helpful and will continue:  - escitalopram (LEXAPRO) 20 MG tablet  Dispense: 90 tablet; Refill: 1    Arthritis of right hand  Ongoing arthritis of the hands and meloxicam does provide some relief and will continue.  Take with food and water.  We will continue to monitor kidney function.  - meloxicam (MOBIC) 15 MG tablet  Dispense: 90 tablet; Refill: 1        BMI:   Estimated body mass index is 36.8 kg/m  as calculated from the following:    Height as of 6/26/23: 1.676 m (5' 6\").    Weight as of this encounter: 103.4 kg (228 lb).   Weight management plan: Discussed healthy diet and exercise guidelines      Return in about 1 month (around 8/11/2023) for symptoms failing to improve or sooner if worsening.          Sonny Conde MD      [address] [address]  [email or web address]   Office: [phone]       Shanae Schreiber is a 53 year old, presenting for the following health issues:  Derm Problem (Cyst or node, middle knuckle finger on right hand)        7/11/2023     8:36 AM   Additional Questions   Roomed by Andres MARTINEZ CMA     History of Present Illness       Reason for visit:  Node or cyst on finger  Symptom onset:  More than a month  Symptoms include:  Node or cyst on finger  Symptom intensity:  Moderate  Symptom progression:  Staying the same  Had these symptoms before:  No  What makes it worse:  Pressing on it  What makes it better:  Resting    She eats 2-3 servings of fruits and vegetables daily.She consumes 0 " sweetened beverage(s) daily.She exercises with enough effort to increase her heart rate 20 to 29 minutes per day.  She exercises with enough effort to increase her heart rate 4 days per week.   She is taking medications regularly.    Today's PHQ-9         PHQ-9 Total Score: 3    PHQ-9 Q9 Thoughts of better off dead/self-harm past 2 weeks :   Not at all    How difficult have these problems made it for you to do your work, take care of things at home, or get along with other people: Not difficult at all  Today's JAYDE-7 Score: 0 couple months can be    Derm Problem - Cyst or node, middle knuckle finger on right hand x couple months, getting bigger, painful to bend and to touch.    Tender right hand      Review of Systems giving 0 point      Objective    /82 (BP Location: Right arm, Patient Position: Sitting, Cuff Size: Adult Large)   Pulse 72   Temp 97.8  F (36.6  C) (Tympanic)   Resp 15   Wt 103.4 kg (228 lb)   LMP 03/17/2014   SpO2 99%   BMI 36.80 kg/m    Body mass index is 36.8 kg/m .  Physical Exam   GENERAL: healthy, alert and no distress  MS: no gross musculoskeletal defects noted, no edema  SKIN: no suspicious lesions or rashes  NEURO: Normal strength and tone, mentation intact and speech normal

## 2023-09-03 DIAGNOSIS — M19.041 ARTHRITIS OF RIGHT HAND: ICD-10-CM

## 2023-09-03 DIAGNOSIS — F41.9 ANXIETY: ICD-10-CM

## 2023-09-06 RX ORDER — ESCITALOPRAM OXALATE 20 MG/1
20 TABLET ORAL DAILY
Qty: 90 TABLET | Refills: 1 | OUTPATIENT
Start: 2023-09-06

## 2023-09-06 RX ORDER — MELOXICAM 15 MG/1
15 TABLET ORAL DAILY
Qty: 90 TABLET | Refills: 1 | OUTPATIENT
Start: 2023-09-06

## 2023-11-14 ENCOUNTER — OFFICE VISIT (OUTPATIENT)
Dept: FAMILY MEDICINE | Facility: CLINIC | Age: 53
End: 2023-11-14
Payer: COMMERCIAL

## 2023-11-14 ENCOUNTER — ANCILLARY PROCEDURE (OUTPATIENT)
Dept: GENERAL RADIOLOGY | Facility: CLINIC | Age: 53
End: 2023-11-14
Attending: FAMILY MEDICINE
Payer: COMMERCIAL

## 2023-11-14 VITALS
WEIGHT: 215 LBS | TEMPERATURE: 97.8 F | DIASTOLIC BLOOD PRESSURE: 80 MMHG | OXYGEN SATURATION: 98 % | BODY MASS INDEX: 34.55 KG/M2 | HEART RATE: 76 BPM | RESPIRATION RATE: 16 BRPM | SYSTOLIC BLOOD PRESSURE: 124 MMHG | HEIGHT: 66 IN

## 2023-11-14 DIAGNOSIS — M76.31 ILIOTIBIAL BAND SYNDROME, RIGHT: ICD-10-CM

## 2023-11-14 DIAGNOSIS — M79.644 BILATERAL THUMB PAIN: ICD-10-CM

## 2023-11-14 DIAGNOSIS — M79.645 BILATERAL THUMB PAIN: ICD-10-CM

## 2023-11-14 DIAGNOSIS — E03.9 HYPOTHYROIDISM, UNSPECIFIED TYPE: ICD-10-CM

## 2023-11-14 DIAGNOSIS — H61.21 IMPACTED CERUMEN OF RIGHT EAR: ICD-10-CM

## 2023-11-14 DIAGNOSIS — M70.61 TROCHANTERIC BURSITIS OF BOTH HIPS: ICD-10-CM

## 2023-11-14 DIAGNOSIS — M70.62 TROCHANTERIC BURSITIS OF BOTH HIPS: ICD-10-CM

## 2023-11-14 DIAGNOSIS — M76.32 ILIOTIBIAL BAND SYNDROME, LEFT: ICD-10-CM

## 2023-11-14 DIAGNOSIS — M79.645 BILATERAL THUMB PAIN: Primary | ICD-10-CM

## 2023-11-14 DIAGNOSIS — M79.644 BILATERAL THUMB PAIN: Primary | ICD-10-CM

## 2023-11-14 PROCEDURE — 69210 REMOVE IMPACTED EAR WAX UNI: CPT | Mod: RT | Performed by: FAMILY MEDICINE

## 2023-11-14 PROCEDURE — 99213 OFFICE O/P EST LOW 20 MIN: CPT | Mod: 25 | Performed by: FAMILY MEDICINE

## 2023-11-14 PROCEDURE — 73140 X-RAY EXAM OF FINGER(S): CPT | Mod: TC | Performed by: RADIOLOGY

## 2023-11-14 PROCEDURE — 20600 DRAIN/INJ JOINT/BURSA W/O US: CPT | Performed by: FAMILY MEDICINE

## 2023-11-14 RX ORDER — TRIAMCINOLONE ACETONIDE 40 MG/ML
10 INJECTION, SUSPENSION INTRA-ARTICULAR; INTRAMUSCULAR ONCE
Status: COMPLETED | OUTPATIENT
Start: 2023-11-14 | End: 2023-11-14

## 2023-11-14 RX ADMIN — TRIAMCINOLONE ACETONIDE 10 MG: 40 INJECTION, SUSPENSION INTRA-ARTICULAR; INTRAMUSCULAR at 14:36

## 2023-11-14 NOTE — PROGRESS NOTES
Assessment & Plan   Bilateral thumb pain  Signs of arthritis minimally on thumb x-rays, has tried meloxicam and Tylenol, ice to the area or heat can be helpful at x2.  Relative rest.  Discussed risks and benefits and steroid injection and she would like to try that and that was done on the right side.  - XR Finger Left G/E 2 Views  - XR Finger Right G/E 2 Views  - DRAIN/INJECT SMALL JOINT/BURSA    Trochanteric bursitis of both hips  Iliotibial band syndrome, left  Iliotibial band syndrome, right  Ongoing pain on the lateral hips and stretches recommended.  Relative rest, ice or heat to the area as able.    Hypothyroidism, unspecified type  Controlled - continue medication(s).    Impacted cerumen of right ear  Wax partially removed with forceps via myself and then irrigated by MA.              Return in about 4 months (around 3/3/2024) for medication recheck.          Sonny Conde MD      [address] [address]  [email or web address]   Office: [phone]       Shanae Schreiber is a 53 year old, presenting for the following health issues:  Ear Problem and Edema        11/14/2023     1:16 PM   Additional Questions   Roomed by Cathi JESUS CMA       History of Present Illness       Reason for visit:  Leg edema and plugged right ear  Symptom onset:  3-4 weeks ago  Symptoms include:  Led swelling, itchy rash, and difficulty hearing in right ear  Symptom intensity:  Moderate  Symptom progression:  Staying the same  Had these symptoms before:  Yes  Has tried/received treatment for these symptoms:  No  What makes it worse:  Extensive walking  What makes it better:  Elevation, rest    She eats 2-3 servings of fruits and vegetables daily.She consumes 0 sweetened beverage(s) daily.She exercises with enough effort to increase her heart rate 20 to 29 minutes per day.  She exercises with enough effort to increase her heart rate 4 days per week.   She is taking medications  "regularly.       Her right ear gets plugged on and off and she can not hear out of it sometimes. She has used ear drops to try to break it up, but she is not getting any success with it. She denies any sore throat or being sick.    She went on a trip to [place] about 3 weeks ago and noticed that the swelling in her left leg got worse. It got better within 5 days of getting back home. She also had a red, rashy, itchy spot around the time it was swollen. It was not dry, but it was red and itchy. The greater saphenous vein closed on her right leg and she is wondering if she needs to see somebody for that. She denies any swelling in her left leg. It swelled more after walking all day. She denies any knee pain or ankle pain. She denies any pain in the calf muscle. She denies any muscle strain. She is not on any diuretics.    She is on meloxicam for arthritis in her hands, but it is not helping anymore. Her hands hurt all the time, especially in the CMC joint. She has not tried steroid injections. She can not do crafts, jimmy, board or needle point anymore. It is tough for her to sew on a button. She does not work on a keyboard routinely. She is right-hand dominant. It has gotten worse over the last few months. It has been there for about 7 years. It was prescribed by [facility].     She gets pain in her lateral hips when she walks up the stairs. It started about 6 weeks ago. It is better now because she is walking on flat and it does not bother her except the flight of stairs in the house. She is on meloxicam for arthritis and not sure if that is working well enough      She has had bilateral knee replacements.    Review of Systems         Objective    /80   Pulse 76   Temp 97.8  F (36.6  C) (Tympanic)   Resp 16   Ht 1.676 m (5' 6\")   Wt 97.5 kg (215 lb)   LMP 03/17/2014   SpO2 98%   BMI 34.70 kg/m    Body mass index is 34.7 kg/m .  Physical Exam   GENERAL: no acute distress  EYES: Conjunctiva " are not injected, no discharge.  EARS: Left TM -no erythema, no effusion,  not bulged.               Right TM -no erythema, no effusion,  not bulged.  NOSE: no discharge, no sinus tenderness  THROAT: no erythema, no exudate, no lesions  NECK: supple, no adenopathy.  CARDIAC: regular rate and rhythm, no murmur  RESP: clear, no wheezing, no rales, no rhonchi  ABD: soft, no distension, no tenderness  SKIN: No rashes  Ext: Pain at the CMC more so versus MCP joints of both first digits.          After consent was obtained, using sterile technique the right CMC joint area was prepped, and using a 25g 5/8 inch needle, 0.75 cc's of 1% plain Lidocaine and  10 mg (40mg/ml) of Kenalog was then injected into the right CMC joint and the needle withdrawn.  The procedure was well tolerated.  The patient is asked to continue to rest the treated area for 5-7 more days before resuming regular activities.  It may be more painful for the first 1-2 days.  Watch for fever, or increased swelling or persistent pain. Call or return to clinic prn if such symptoms occur or the pain fails to improve as anticipated.

## 2023-11-20 DIAGNOSIS — N39.41 URGENCY INCONTINENCE: ICD-10-CM

## 2023-11-21 RX ORDER — TOLTERODINE 2 MG/1
2 CAPSULE, EXTENDED RELEASE ORAL DAILY
Qty: 90 CAPSULE | Refills: 0 | Status: SHIPPED | OUTPATIENT
Start: 2023-11-21 | End: 2024-01-16

## 2023-11-21 NOTE — TELEPHONE ENCOUNTER
"Requested Prescriptions   Pending Prescriptions Disp Refills    tolterodine ER (DETROL LA) 2 MG 24 hr capsule [Pharmacy Med Name: TOLTERODINE TART 2MG ER CAPSULES] 180 capsule      Sig: TAKE 2 CAPSULES(4 MG) BY MOUTH DAILY       Muscarinic Antagonists (Urinary Incontinence Agents) Passed - 11/20/2023 11:01 PM        Passed - Recent (12 mo) or future (30 days) visit within the authorizing provider's specialty     Patient has had an office visit with the authorizing provider or a provider within the authorizing providers department within the previous 12 mos or has a future within next 30 days. See \"Patient Info\" tab in inbasket, or \"Choose Columns\" in Meds & Orders section of the refill encounter.              Passed - Patient does not have a diagnosis of glaucoma on the problem list     If glaucoma diagnosis is new, refer refill to physician.          Passed - Medication is active on med list        Passed - Patient is 18 years of age or older           Last Written Prescription Date:  1/10/23  Last Fill Quantity: 9,  # refills: 3   Last office visit: 1/10/2023 ; last virtual visit: Visit date not found with prescribing provider:  Ganesh   Future Office Visit:  1/16/24 with Dr. Andersen    Prescription approved per [facility] Refill Protocol.    Kamini Noriega RN on 11/21/2023 at 5:59 AM        "

## 2023-12-01 DIAGNOSIS — E03.9 HYPOTHYROIDISM, UNSPECIFIED TYPE: ICD-10-CM

## 2023-12-01 DIAGNOSIS — I10 HYPERTENSION GOAL BP (BLOOD PRESSURE) < 130/80: ICD-10-CM

## 2023-12-01 RX ORDER — LISINOPRIL 10 MG/1
10 TABLET ORAL DAILY
Qty: 90 TABLET | Refills: 0 | Status: SHIPPED | OUTPATIENT
Start: 2023-12-01 | End: 2024-02-29

## 2023-12-01 RX ORDER — LEVOTHYROXINE SODIUM 137 UG/1
TABLET ORAL
Qty: 90 TABLET | Refills: 0 | Status: SHIPPED | OUTPATIENT
Start: 2023-12-01 | End: 2024-02-29

## 2023-12-07 ENCOUNTER — TRANSFERRED RECORDS (OUTPATIENT)
Dept: HEALTH INFORMATION MANAGEMENT | Facility: CLINIC | Age: 53
End: 2023-12-07
Payer: COMMERCIAL

## 2024-01-04 ENCOUNTER — PATIENT OUTREACH (OUTPATIENT)
Dept: CARE COORDINATION | Facility: CLINIC | Age: 54
End: 2024-01-04
Payer: COMMERCIAL

## 2024-01-09 ASSESSMENT — ENCOUNTER SYMPTOMS
PALPITATIONS: 0
NAUSEA: 0
JOINT SWELLING: 1
EYE PAIN: 0
CONSTIPATION: 0
FREQUENCY: 0
ABDOMINAL PAIN: 0
DIZZINESS: 0
HEADACHES: 0
HEMATOCHEZIA: 0
DIARRHEA: 0
SHORTNESS OF BREATH: 0
PARESTHESIAS: 0
WEAKNESS: 0
HEMATURIA: 0
HEARTBURN: 0
FEVER: 0
BREAST MASS: 0
COUGH: 0
ARTHRALGIAS: 1
CHILLS: 0
DYSURIA: 0
MYALGIAS: 0
NERVOUS/ANXIOUS: 0
SORE THROAT: 0

## 2024-01-15 NOTE — PROGRESS NOTES
Christine is a 53 year old  female who presents for annual exam.     Besides routine health maintenance, she has no other health concerns today .    HPI:  Here today for yearly exam --doing well.  S/p LASH in  and BSO with me in  for ovarian cyst.  Has done well on oral estrace since that time.  No hot flushes or night sweats.  Open to starting the weaning process with her HRT.  Rarely SA.  Denies vaginal dryness or pain.  Some constipation with her current weight loss meds --using benefiber to help with constipation.  Using detrol to help with urinary urgency.  Much improved unless she has gone too long between emptying.  NO leaking or incontinence.  Denies any significant side effects with her anticholinergic.    ; homemaker; 4 children--oldest expecting first grandson in March!!  [place]  in [place] this year  -staying active with day to day housework, etc.  Not as much formal exercise as she canceled her gym membership as she wasn't using it enough to be worth it  PCP -Dr. Prudencio Conde MD --will see in March; follows bloodwork, meds, etc  Also working with Jese on weight loss; started compounded semiglutide this year  -up to date on colonoscopy ()--will repeat in   -declines vaccines today      GYNECOLOGIC HISTORY:    Patient's last menstrual period was 2014.    Her current contraception method is: hysterectomy.  She  reports that she has never smoked. She has never used smokeless tobacco.    Patient is sexually active.  STD testing offered?  Declined    Last PHQ-9 score on record =       2024     2:45 PM   PHQ-9 SCORE   PHQ-9 Total Score 3     Last GAD7 score on record =       2024     2:45 PM   JAYDE-7 SCORE   Total Score 0     Alcohol Score = 1    HEALTH MAINTENANCE:  Cholesterol:   Recent Labs   Lab Test 23  1022 22  1003   CHOL 198 153   HDL 40* 31*   * 80   TRIG 110 211*      Last Mammo:  01/10/23 , Result: Normal, Next Mammo: Today   Pap:    Lab Results   Component Value Date    GYNINTERP  01/10/2023     Negative for Intraepithelial Lesion or Malignancy (NILM)    PAP NIL 2017      Colonoscopy: 20, Result: Normal, repeat 5 years, Next Colonoscopy:   Dexa:  N/A    Health maintenance updated:  yes    HISTORY:  OB History    Para Term  AB Living   5 4 3 1 1 4   SAB IAB Ectopic Multiple Live Births   1 0 0 0 4      # Outcome Date GA Lbr Amol/2nd Weight Sex Delivery Anes PTL Lv   5 Term 07 37w0d  3.118 kg (6 lb 14 oz) M    SHAN      Birth Comments: was on tegretol during preg for seizure disorder      Name: Garrick   4 Term 02 37w0d  2.892 kg (6 lb 6 oz) M    SHAN      Name: Tone   3 Term 99 40w0d  3.714 kg (8 lb 3 oz) F    SHAN   2  97 35w0d   M -SEC   SHAN      Birth Comments: breech      Name: Giancarlo Vizcarra SAB                Patient Active Problem List   Diagnosis    Hypertension goal BP (blood pressure) < 130/80    Osteoarthritis    Knee joint replacement by other means    History of arthroplasty of left knee    Posttraumatic stress disorder    Hypothyroidism, unspecified type    Arthritis of right hand    Arthralgia of right acromioclavicular joint    Surgical menopause on hormone replacement therapy    Urinary urgency    Class 2 severe obesity due to excess calories with serious comorbidity in adult (H)    Digital mucinous cyst of finger of right hand     Past Surgical History:   Procedure Laterality Date    ARTHROPLASTY KNEE UNICOMPARTMENT Right 2015    Procedure: ARTHROPLASTY KNEE UNICOMPARTMENT;  Surgeon: Pablo Lara MD;  Location:  OR    ARTHROPLASTY KNEE UNICOMPARTMENT Left 10/27/2015    Procedure: ARTHROPLASTY KNEE UNICOMPARTMENT;  Surgeon: Pablo Lara MD;  Location:  OR     SECTION  1997    for breech    CYCTOCELE REPAIR  2010    sling    CYSTOSCOPY  2014    Procedure: CYSTOSCOPY;;  Surgeon: Candie Crandall MD;   Location: [facility]    DECOMPRESSION LUMBAR ONE LEVEL Right 09/16/2015    Procedure: DECOMPRESSION LUMBAR ONE LEVEL;  Surgeon: Elio Starr MD;  Location: [facility]    DILATION AND CURETTAGE, OPERATIVE HYSTEROSCOPY, COMBINED  03/08/2012    Procedure:COMBINED DILATION AND CURETTAGE, OPERATIVE HYSTEROSCOPY; OPERATIVE HYSTEROSCOPY, DILATION AND CURETTAGE, POSSIBLE RESECTION OF POLYP; Surgeon:CANDIE CRANDALL; Location: [facility]    IR CERVICAL EPIDURAL STEROID INJECTION  11/28/2012    LAPAROSCOPIC HYSTERECTOMY SUPRACERVICAL  04/09/2014    Procedure: LAPAROSCOPIC HYSTERECTOMY SUPRACERVICAL;  LAPAROSCOPIC ASSISTED SUPRACERVICAL HYSTERECTOMY, cystoscopy;  Surgeon: Candie Crandall MD;  Location: [facility]    LAPAROSCOPIC OOPHORECTOMY Bilateral 07/23/2020    Procedure: LAPAROSCOPIC BILATERAL OOPHORECTOMY;  Surgeon: Christiana Andersen MD;  Location[facility]    REPAIR TENDON ANKLE Left 12/28/2022    Procedure: LEFT PERONEAL TENDON REPAIR;  Surgeon: Nick Beebe DPM;  Location: [facility]    STRIP VEIN  11/2010    TONSILLECTOMY  1974      Social History     Tobacco Use    Smoking status: Never    Smokeless tobacco: Never   Substance Use Topics    Alcohol use: Yes     Alcohol/week: 0.0 standard drinks of alcohol     Comment: Socially      Problem (# of Occurrences) Relation (Name,Age of Onset)    Diabetes (4) Maternal Grandmother (Amy), Maternal Grandfather (Willis), Paternal Grandmother, Paternal Grandfather    Hypertension (4) Mother (Raiza), Father (Remy), Maternal Grandmother (May), Paternal Grandmother    Osteoporosis (1) Maternal Grandmother (Amy)    Cerebrovascular Disease (1) Paternal Grandmother: TIA    Thyroid Disease (1) Mother (Raiza)    Hashimoto's thyroiditis (1) Son    Hyperlipidemia (2) Mother (Raiza): high triglycerides, Father (Remy): not on meds    Fractures (1) Maternal Grandmother (Amy): Hip    Hypothyroidism (1) Daughter    Lung Cancer (1) Maternal Grandfather  "(Willis, 68): smoker    Stomach Cancer (1) Paternal Grandfather (68)    Diabetes Type 1 (1) Son    No Known Problems (2) Son, Son           Negative family history of: Coronary Artery Disease, Breast Cancer, Colon Cancer              Current Outpatient Medications   Medication Sig    Calcium-Magnesium-Zinc 333-133-5 MG TABS Take 1 tablet by mouth At Bedtime    escitalopram (LEXAPRO) 20 MG tablet Take 1 tablet (20 mg) by mouth daily    estradiol (ESTRACE) 0.5 MG tablet TAKE 1 TABLET BY MOUTH DAILY    IRON-FOLIC ACID PO     KRILL OIL PO Take 1 capsule by mouth every evening     levothyroxine (SYNTHROID/LEVOTHROID) 137 MCG tablet TAKE 1 TABLET(137 MCG) BY MOUTH DAILY    lisinopril (ZESTRIL) 10 MG tablet TAKE 1 TABLET(10 MG) BY MOUTH DAILY    meloxicam (MOBIC) 15 MG tablet Take 1 tablet (15 mg) by mouth daily    olopatadine (PATANOL) 0.1 % ophthalmic solution PLACE 1 DROP INTO BOTH EYES TWICE DAILY (Patient taking differently: as needed for allergies)    Pediatric Multivit-Minerals-C (MULTIVITAMIN GUMMIES CHILDRENS PO) Take 2 chew tab by mouth daily as needed    tolterodine ER (DETROL LA) 2 MG 24 hr capsule Take 1 capsule (2 mg) by mouth daily     No current facility-administered medications for this visit.     Allergies   Allergen Reactions    Imitrex [Sumatriptan] Other (See Comments)     Throat tightness       Past medical, surgical, social and family histories were reviewed and updated in EPIC.    ROS:   12 point review of systems negative other than symptoms noted below or in the HPI.  No urinary frequency or dysuria, bladder or kidney problems    EXAM:  /76   Ht 1.664 m (5' 5.5\")   Wt 97.5 kg (215 lb)   LMP 03/17/2014   BMI 35.23 kg/m     BMI: Body mass index is 35.23 kg/m .    PHYSICAL EXAM:  Constitutional:   Appearance: Well nourished, well developed, alert, in no acute distress  Neck:  Lymph Nodes:  No lymphadenopathy present    Thyroid:  Gland size normal, nontender, no nodules or masses present "  on palpation  Chest:  Respiratory Effort:  Breathing unlabored  Cardiovascular:    Heart: Auscultation:  Regular rate, normal rhythm, no murmurs present  Breasts: Palpation of Breasts and Axillae:  No masses present on palpation, no breast tenderness., Axillary Lymph Nodes:  No lymphadenopathy present., and No nodularity, asymmetry or nipple discharge bilaterally.  Gastrointestinal:   Abdominal Examination:  Abdomen nontender to palpation, tone normal without rigidity or guarding, no masses present, umbilicus without lesions   Liver and Spleen:  No hepatomegaly present, liver nontender to palpation    Hernias:  No hernias present  Lymphatic: Lymph Nodes:  No other lymphadenopathy present  Skin:  General Inspection:  No rashes present, no lesions present, no areas of  discoloration  Neurologic:    Mental Status:  Oriented X3.  Normal strength and tone, sensory exam                grossly normal, mentation intact and speech normal.    Psychiatric:   Mentation appears normal and affect normal/bright.         Pelvic Exam:  External Genitalia:     Normal appearance for age, no discharge present, no tenderness present, no inflammatory lesions present, color normal  Vagina:     Normal vaginal vault without central or paravaginal defects, no discharge present, no inflammatory lesions present, no masses present  Bladder:     Nontender to palpation  Urethra:   Urethral Body:  Urethra palpation normal, urethra structural support normal   Urethral Meatus:  No erythema or lesions present  Cervix:    Appearance healthy, no lesions present, nontender to palpation, no bleeding present  Uterus:     Surgically absent  Adnexa:     Surgically absent  Perineum:     Perineum within normal limits, no evidence of trauma, no rashes or skin lesions present  Anus:     Anus within normal limits, no hemorrhoids present  Inguinal Lymph Nodes:     No lymphadenopathy present  Pubic Hair:     Normal pubic hair distribution for age  Genitalia and  Groin:     No rashes present, no lesions present, no areas of discoloration, no masses present    COUNSELING:   Reviewed preventive health counseling, as reflected in patient instructions  Special attention given to:        Regular exercise       Healthy diet/nutrition       Colorectal Cancer Screening       (Amanda)menopause management    BMI: Body mass index is 35.23 kg/m .  Weight management plan: Discussed healthy diet and exercise guidelines Patient was referred to their PCP to discuss a diet and exercise plan.  Will continue working with Jese on weight loss program.  Discussed importance of getting back on track with physical exercise component of her journey as well.    ASSESSMENT:  53 year old female with satisfactory annual exam.    ICD-10-CM    1. Encounter for gynecological examination without abnormal finding  Z01.419       2. Symptomatic menopausal or female climacteric states  N95.1 estradiol (ESTRACE) 0.5 MG tablet      3. Urgency incontinence  N39.41 tolterodine ER (DETROL LA) 2 MG 24 hr capsule          PLAN:  Patient Instructions   Follow up with your primary care provider for your other medical problems.  WHI study was discussed in detail including current information.  The patient requests continuation of hormone therapy but will either cut her pill in half daily or will switch to every other day dosing.  Continue self breast exam.  Increase physical activity and exercise.  Usual safety and preventative measures counseling done.  BMI >25  Weight loss encouraged.  Last pap smear (2023) was normal and negative for the DNA of high risk HPV subtypes.  No pap was obtained this year.  This was discussed with the patient and she agrees with the plan.       Christiana Andersen MD    Answers submitted by the patient for this visit:  Annual Preventive Visit (Submitted on 1/9/2024)  Chief Complaint: Annual Exam:  Frequency of exercise:: 2-3 days/week  Getting at least 3 servings of Calcium per day::  Yes  Diet:: Regular (no restrictions)  Taking medications regularly:: Yes  Medication side effects:: None  Bi-annual eye exam:: Yes  Dental care twice a year:: Yes  Sleep apnea or symptoms of sleep apnea:: None  abdominal pain: No  Blood in stool: No  Blood in urine: No  chest pain: No  chills: No  congestion: No  constipation: No  cough: No  diarrhea: No  dizziness: No  ear pain: No  eye pain: No  nervous/anxious: No  fever: No  frequency: No  genital sores: No  headaches: No  hearing loss: No  heartburn: No  arthralgias: Yes  joint swelling: Yes  peripheral edema: No  mood changes: No  myalgias: No  nausea: No  dysuria: No  palpitations: No  Skin sensation changes: No  sore throat: No  urgency: No  rash: No  shortness of breath: No  visual disturbance: No  weakness: No  pelvic pain: No  vaginal bleeding: No  vaginal discharge: No  tenderness: No  breast mass: No  breast discharge: No  Additional concerns today:: No  Exercise outside of work (Submitted on 1/9/2024)  Chief Complaint: Annual Exam:  Duration of exercise:: 15-30 minutes

## 2024-01-16 ENCOUNTER — OFFICE VISIT (OUTPATIENT)
Dept: OBGYN | Facility: CLINIC | Age: 54
End: 2024-01-16
Payer: COMMERCIAL

## 2024-01-16 ENCOUNTER — ANCILLARY PROCEDURE (OUTPATIENT)
Dept: MAMMOGRAPHY | Facility: CLINIC | Age: 54
End: 2024-01-16
Payer: COMMERCIAL

## 2024-01-16 VITALS
SYSTOLIC BLOOD PRESSURE: 114 MMHG | BODY MASS INDEX: 34.55 KG/M2 | WEIGHT: 215 LBS | HEIGHT: 66 IN | DIASTOLIC BLOOD PRESSURE: 76 MMHG

## 2024-01-16 DIAGNOSIS — Z12.31 VISIT FOR SCREENING MAMMOGRAM: ICD-10-CM

## 2024-01-16 DIAGNOSIS — Z01.419 ENCOUNTER FOR GYNECOLOGICAL EXAMINATION WITHOUT ABNORMAL FINDING: Primary | ICD-10-CM

## 2024-01-16 DIAGNOSIS — N39.41 URGENCY INCONTINENCE: ICD-10-CM

## 2024-01-16 DIAGNOSIS — N95.1 SYMPTOMATIC MENOPAUSAL OR FEMALE CLIMACTERIC STATES: ICD-10-CM

## 2024-01-16 PROCEDURE — 77067 SCR MAMMO BI INCL CAD: CPT | Mod: TC | Performed by: RADIOLOGY

## 2024-01-16 PROCEDURE — 99396 PREV VISIT EST AGE 40-64: CPT | Performed by: OBSTETRICS & GYNECOLOGY

## 2024-01-16 PROCEDURE — 77063 BREAST TOMOSYNTHESIS BI: CPT | Mod: TC | Performed by: RADIOLOGY

## 2024-01-16 RX ORDER — ESTRADIOL 0.5 MG/1
TABLET ORAL
Qty: 90 TABLET | Refills: 3 | Status: SHIPPED | OUTPATIENT
Start: 2024-01-16

## 2024-01-16 RX ORDER — TOLTERODINE 2 MG/1
2 CAPSULE, EXTENDED RELEASE ORAL DAILY
Qty: 90 CAPSULE | Refills: 3 | Status: SHIPPED | OUTPATIENT
Start: 2024-01-16 | End: 2024-03-01

## 2024-01-16 ASSESSMENT — ANXIETY QUESTIONNAIRES
6. BECOMING EASILY ANNOYED OR IRRITABLE: NOT AT ALL
3. WORRYING TOO MUCH ABOUT DIFFERENT THINGS: NOT AT ALL
IF YOU CHECKED OFF ANY PROBLEMS ON THIS QUESTIONNAIRE, HOW DIFFICULT HAVE THESE PROBLEMS MADE IT FOR YOU TO DO YOUR WORK, TAKE CARE OF THINGS AT HOME, OR GET ALONG WITH OTHER PEOPLE: NOT DIFFICULT AT ALL
7. FEELING AFRAID AS IF SOMETHING AWFUL MIGHT HAPPEN: NOT AT ALL
2. NOT BEING ABLE TO STOP OR CONTROL WORRYING: NOT AT ALL
5. BEING SO RESTLESS THAT IT IS HARD TO SIT STILL: NOT AT ALL
GAD7 TOTAL SCORE: 0
1. FEELING NERVOUS, ANXIOUS, OR ON EDGE: NOT AT ALL
GAD7 TOTAL SCORE: 0

## 2024-01-16 ASSESSMENT — PATIENT HEALTH QUESTIONNAIRE - PHQ9
SUM OF ALL RESPONSES TO PHQ QUESTIONS 1-9: 3
5. POOR APPETITE OR OVEREATING: NOT AT ALL

## 2024-01-16 NOTE — PATIENT INSTRUCTIONS
Follow up with your primary care provider for your other medical problems.  WHI study was discussed in detail including current information.  The patient requests continuation of hormone therapy but will either cut her pill in half daily or will switch to every other day dosing.  Continue self breast exam.  Increase physical activity and exercise.  Usual safety and preventative measures counseling done.  BMI >25  Weight loss encouraged.  Last pap smear (2023) was normal and negative for the DNA of high risk HPV subtypes.  No pap was obtained this year.  This was discussed with the patient and she agrees with the plan.

## 2024-01-18 ENCOUNTER — PATIENT OUTREACH (OUTPATIENT)
Dept: CARE COORDINATION | Facility: CLINIC | Age: 54
End: 2024-01-18
Payer: COMMERCIAL

## 2024-01-26 DIAGNOSIS — N95.1 SYMPTOMATIC MENOPAUSAL OR FEMALE CLIMACTERIC STATES: ICD-10-CM

## 2024-01-26 RX ORDER — ESTRADIOL 0.5 MG/1
TABLET ORAL
Qty: 90 TABLET | Refills: 3 | OUTPATIENT
Start: 2024-01-26

## 2024-01-26 NOTE — TELEPHONE ENCOUNTER
3/6/2018    Danyel Levin is a 15 year old male who was seen for medication management. Patient and father presented to outpatient clinic for ongoing medication management for ADHD, anxiety, DMDD. Patient and father interviewed.    Chief Complaint:  Anxiety, inattentiveness  Diagnosis:  ADHD combined type  Anxiety disorder NOS  z79.899  Autism spectrum disorder  DMDD  R/o PDD NOS  Noctural enuresis  History of tics disorder NOS  Sensory integration dysfunction        Medications:  Current Outpatient Prescriptions   Medication Sig Dispense Refill   • ARIPiprazole (ABILIFY) 15 MG tablet Take 1 tablet by mouth daily. 30 tablet 5   • minocycline (MINOCIN,DYNACIN) 100 MG capsule Take 1 capsule by mouth 2 times daily. 60 capsule 5   • albuterol 108 (90 BASE) MCG/ACT inhaler Inhale 2 puffs into the lungs every 4 hours as needed for Shortness of Breath or Wheezing. 10 minutes before physical activity like running. 1 Inhaler 0   • Multiple Vitamins-Minerals (MULTIVITAMIN PO) Take  by mouth.       No current facility-administered medications for this visit.        No major side effects.    Allergies:  ALLERGIES:  No Known Allergies      Vitals:    03/06/18 1317   BP: 108/68   Pulse: 74   Weight: 83.6 kg   Height: 5' 11.85\" (1.825 m)         Subjective:  Patient has not been seen for 14 months. Patient reported he has been feeling tired a lot. He has been feeling sleepy. He reported he has been having difficulty focusing and concentrating. He gets distracted easily. He was hyper during the interview. He was interrupting his father frequently. He was having difficulty focusing and concentrating during the interview. Patient's father reported that he has been having still intermittent anger outbursts. He has been getting frustrated easily. He has been getting agitated easily. He outburst have reduced in intensity and frequency. Patient has been struggling in couple of the classes. He reported his teacher has not submitted  Requested Prescriptions   Pending Prescriptions Disp Refills    estradiol (ESTRACE) 0.5 MG tablet [Pharmacy Med Name: ESTRADIOL 0.5MG TABLETS] 90 tablet 3     Sig: TAKE 1 TABLET BY MOUTH DAILY       Hormone Replacement Therapy Passed - 1/26/2024  7:51 AM        Passed - Blood pressure under 140/90 in past 12 months     BP Readings from Last 3 Encounters:   01/16/24 114/76   11/14/23 124/80   07/11/23 118/82                 Passed - Recent (12 mo) or future (30 days) visit within the authorizing provider's specialty     The patient must have completed an in-person or virtual visit within the past 12 months or has a future visit scheduled within the next 90 days with the authorizing provider s specialty.  Urgent care and e-visits do not quality as an office visit for this protocol.          Passed - Patient has mammogram in past 2 years on file if age 50-75        Passed - Medication is active on med list        Passed - Patient is 18 years of age or older        Passed - No active pregnancy on record        Passed - No positive pregnancy test on record in past 12 months           Last Written Prescription Date:  1/16/24  Last Fill Quantity: 90,  # refills: 3   Last office visit: 1/16/2024 ; last virtual visit: Visit date not found with prescribing provider:  Ganesh   Future Office Visit:      Refill request refused due to:  [x]Refills available at pharmacy. Request sent back to pharmacy as duplicate.  []Patient reports no longer needing medication.  []Medication discontinued.   Bradley Alvarez RN on 1/26/2024 at 2:20 PM         grades and that is why his grades have been down but he is hoping to have better report card. Patient denied any safety concerns in today's visit. Patient has been tolerating medication well. Patient's father reported that patient's mood symptoms gets worse and when things doesn't go his way. Patient also has difficulty with transition and change which causes substantial anxiety and patient has difficulty focusing and concentrating. Patient's symptoms are not new. Patient's symptoms are present in school as well as at home. He struggles with ADHD symptoms and mood symptoms.        REVIEW OF SYSTEMS:   Psychiatric:  No history suggestive of psychosis.  No history suggestive of manic episode.  No history suggestive of illicit drug use.  Constitutional:  Weight loss  []       Weight Gain   []  No change  []   Neurological:  Headache - Yes [] No    [x]                             Rigidity - Yes [] No [x]                                 Spasticity - Yes   []  No  [x]   Gastrointestinal - Nausea  Yes  []   No  [x]          Stomach upset  Yes []     No  [x]    All other systems reviewed and are negative  Comprehensive Past/Family/Social history which was performed during initial evaluation was reexamined and reviewed with patient/family.  There is nothing new to add today.  For details, please refer to my initial evaluation note in this chart. Patient is attending [employer]. He is freshman at the high school.    Mental Status Examination:  Casually dressed, healthy looking, [x] well [] poorly groomed   [] old, [] boy [] girl [x] adolescent boy [] adolescent girl showed   [x] good [] partial [] no interest in interview.    Eye to eye contact was [x] maintained, [] not maintained, [] partially maintained.  Rapport established.      Mood was [] euthymic, []depressed, [] irritable, [x] anxious, [] angry, [] euphoric,   [] empty, [] guilty, [] perplexed.    It was [x] consistent, [] fluctuating, [] alternating rapidly  between extremes during the interview.    Affect was [x] full, [] constricted, [] blunted, [] flat in range and [] congruent,   [] not congruent with mood.    Speech was [x] spontaneous, [] not spontaneous    Rate and rhythm was [x] regular, [] slow, [] pressured, [] hesitant, [] emotional,   [] dramatic, [] loud, [] monotonous, [] whispered, [] slurred.    Attention and concentration was [x] good, [] poor, [] impaired.    Thought process was [x] logical and coherent, [] illogical, [] incomprehensible.    Rate of thoughts was [x] normal, [] slow, [] hesitant, [] rapid, [] poverty of ideas,   [] overabundance of ideas, [] racing, [] flights of ideas, [] thought blocking.    Associations of thoughts:  [x] Intact, [] loose, [] circumstantial, [] tangential,   [] word salad, [] neologisms.    Thought content:    Delusions  Yes  []    No  [x]     Obsessions  Yes  []    No  [x]     Hallucinations  Yes  []    No  [x]     Tics  Yes  []    No  [x]     Suicidal ideations:  [x] none, [] passive, [] present with plan    Alert and oriented x3.  Memory-[x] immediate, [x]recent, [x] remote-intact.  Language fluent.    Judgment was [x] fair, [] poor, [] impaired.    Insight was [] good, [x] limited, [] poor.    Fund of knowledge was [x] good, [] limited, [] poor    Gait abnormality Yes  []    No  [x]         Treatment Intervention:  Patient and family agreed with above-mentioned medication management.  I discussed the patient and family about continuing current medications as mentioned above. Majority of the discussion in today's visit was focused on how to help him to cope with mood symptoms, ADHD symptoms and anxiety symptoms. We decided to discontinue Intuniv. We will start him on stimulant medication but we would like to get the feedback from family in 2 weeks. Father agreed with that treatment plan. We also reviewed lab that was done in December.     Patient and family agreed with treatment plan.  Collaborate with therapist    []   Collaborate with school  []     Labs ordered   []   Labs reviewed   [x]     I will follow up with patient in 6 months.  If any safety concern arises, patient/family will call 911, go to ER, or nearby hospital.  Patient/family can call my office for any question or concerns during regular business hours.    Chencho Crzu MD

## 2024-01-27 DIAGNOSIS — N95.1 SYMPTOMATIC MENOPAUSAL OR FEMALE CLIMACTERIC STATES: ICD-10-CM

## 2024-01-29 RX ORDER — ESTRADIOL 0.5 MG/1
TABLET ORAL
Qty: 90 TABLET | Refills: 3 | OUTPATIENT
Start: 2024-01-29

## 2024-01-29 NOTE — TELEPHONE ENCOUNTER
"Requested Prescriptions   Pending Prescriptions Disp Refills    estradiol (ESTRACE) 0.5 MG tablet [Pharmacy Med Name: ESTRADIOL 0.5MG TABLETS] 90 tablet 3     Sig: TAKE 1 TABLET BY MOUTH DAILY       Hormone Replacement Therapy Passed - 1/27/2024  3:46 AM        Passed - Blood pressure under 140/90 in past 12 months     BP Readings from Last 3 Encounters:   01/16/24 114/76   11/14/23 124/80   07/11/23 118/82                 Passed - Recent (12 mo) or future (30 days) visit within the authorizing provider's specialty     The patient must have completed an in-person or virtual visit within the past 12 months or has a future visit scheduled within the next 90 days with the authorizing provider s specialty.  Urgent care and e-visits do not quality as an office visit for this protocol.          Passed - Patient has mammogram in past 2 years on file if age 50-75        Passed - Medication is active on med list        Passed - Patient is 18 years of age or older        Passed - No active pregnancy on record        Passed - No positive pregnancy test on record in past 12 months           Last Written Prescription Date:  1/16/24  Last Fill Quantity: 90,  # refills: 3   Last office visit: 1/16/2024 ; last virtual visit: Visit date not found with prescribing provider:  Ganesh   Future Office Visit:  NONE    Refill request refused due to :   Refills available at new pharmacy.  Request sent back to pharmacy as \"duplicate\"    Kamini Noriega RN on 1/29/2024 at 5:43 AM                  "

## 2024-02-29 DIAGNOSIS — M19.041 ARTHRITIS OF RIGHT HAND: ICD-10-CM

## 2024-02-29 DIAGNOSIS — I10 HYPERTENSION GOAL BP (BLOOD PRESSURE) < 130/80: ICD-10-CM

## 2024-02-29 DIAGNOSIS — F41.9 ANXIETY: ICD-10-CM

## 2024-02-29 DIAGNOSIS — E03.9 HYPOTHYROIDISM, UNSPECIFIED TYPE: ICD-10-CM

## 2024-02-29 RX ORDER — MELOXICAM 15 MG/1
15 TABLET ORAL DAILY
Qty: 90 TABLET | Refills: 1 | Status: SHIPPED | OUTPATIENT
Start: 2024-02-29 | End: 2024-03-01

## 2024-02-29 RX ORDER — LISINOPRIL 10 MG/1
10 TABLET ORAL DAILY
Qty: 90 TABLET | Refills: 1 | Status: SHIPPED | OUTPATIENT
Start: 2024-02-29 | End: 2024-03-01

## 2024-02-29 RX ORDER — LEVOTHYROXINE SODIUM 137 UG/1
TABLET ORAL
Qty: 90 TABLET | Refills: 0 | Status: SHIPPED | OUTPATIENT
Start: 2024-02-29 | End: 2024-03-01

## 2024-02-29 RX ORDER — ESCITALOPRAM OXALATE 20 MG/1
20 TABLET ORAL DAILY
Qty: 90 TABLET | Refills: 1 | Status: SHIPPED | OUTPATIENT
Start: 2024-02-29 | End: 2024-03-01

## 2024-03-01 ENCOUNTER — OFFICE VISIT (OUTPATIENT)
Dept: FAMILY MEDICINE | Facility: CLINIC | Age: 54
End: 2024-03-01
Payer: COMMERCIAL

## 2024-03-01 VITALS
HEART RATE: 100 BPM | OXYGEN SATURATION: 100 % | HEIGHT: 66 IN | DIASTOLIC BLOOD PRESSURE: 70 MMHG | RESPIRATION RATE: 16 BRPM | SYSTOLIC BLOOD PRESSURE: 118 MMHG | TEMPERATURE: 98 F | WEIGHT: 211 LBS | BODY MASS INDEX: 33.91 KG/M2

## 2024-03-01 DIAGNOSIS — I10 HYPERTENSION GOAL BP (BLOOD PRESSURE) < 130/80: ICD-10-CM

## 2024-03-01 DIAGNOSIS — M67.40 MUCOID CYST OF JOINT: ICD-10-CM

## 2024-03-01 DIAGNOSIS — E66.01 SEVERE OBESITY (BMI 35.0-39.9) WITH COMORBIDITY (H): ICD-10-CM

## 2024-03-01 DIAGNOSIS — Z13.220 SCREENING FOR HYPERLIPIDEMIA: ICD-10-CM

## 2024-03-01 DIAGNOSIS — F41.9 ANXIETY: ICD-10-CM

## 2024-03-01 DIAGNOSIS — E03.9 HYPOTHYROIDISM, UNSPECIFIED TYPE: Primary | ICD-10-CM

## 2024-03-01 DIAGNOSIS — M25.531 RIGHT WRIST PAIN: ICD-10-CM

## 2024-03-01 DIAGNOSIS — E83.52 HYPERCALCEMIA: ICD-10-CM

## 2024-03-01 DIAGNOSIS — N39.41 URGENCY INCONTINENCE: ICD-10-CM

## 2024-03-01 DIAGNOSIS — M19.041 ARTHRITIS OF RIGHT HAND: ICD-10-CM

## 2024-03-01 PROBLEM — E66.812 CLASS 2 SEVERE OBESITY DUE TO EXCESS CALORIES WITH SERIOUS COMORBIDITY IN ADULT (H): Status: RESOLVED | Noted: 2023-07-11 | Resolved: 2024-03-01

## 2024-03-01 LAB
ERYTHROCYTE [DISTWIDTH] IN BLOOD BY AUTOMATED COUNT: 12.2 % (ref 10–15)
HCT VFR BLD AUTO: 42.2 % (ref 35–47)
HGB BLD-MCNC: 14.8 G/DL (ref 11.7–15.7)
MCH RBC QN AUTO: 30.1 PG (ref 26.5–33)
MCHC RBC AUTO-ENTMCNC: 35.1 G/DL (ref 31.5–36.5)
MCV RBC AUTO: 86 FL (ref 78–100)
PLATELET # BLD AUTO: 224 10E3/UL (ref 150–450)
RBC # BLD AUTO: 4.92 10E6/UL (ref 3.8–5.2)
WBC # BLD AUTO: 6.7 10E3/UL (ref 4–11)

## 2024-03-01 PROCEDURE — 80061 LIPID PANEL: CPT | Performed by: FAMILY MEDICINE

## 2024-03-01 PROCEDURE — 36415 COLL VENOUS BLD VENIPUNCTURE: CPT | Performed by: FAMILY MEDICINE

## 2024-03-01 PROCEDURE — 85027 COMPLETE CBC AUTOMATED: CPT | Performed by: FAMILY MEDICINE

## 2024-03-01 PROCEDURE — 82652 VIT D 1 25-DIHYDROXY: CPT | Performed by: FAMILY MEDICINE

## 2024-03-01 PROCEDURE — 99214 OFFICE O/P EST MOD 30 MIN: CPT | Mod: 25 | Performed by: FAMILY MEDICINE

## 2024-03-01 PROCEDURE — 82306 VITAMIN D 25 HYDROXY: CPT | Performed by: FAMILY MEDICINE

## 2024-03-01 PROCEDURE — 82570 ASSAY OF URINE CREATININE: CPT | Performed by: FAMILY MEDICINE

## 2024-03-01 PROCEDURE — 20605 DRAIN/INJ JOINT/BURSA W/O US: CPT | Performed by: FAMILY MEDICINE

## 2024-03-01 PROCEDURE — 84439 ASSAY OF FREE THYROXINE: CPT | Performed by: FAMILY MEDICINE

## 2024-03-01 PROCEDURE — 80053 COMPREHEN METABOLIC PANEL: CPT | Performed by: FAMILY MEDICINE

## 2024-03-01 PROCEDURE — 84443 ASSAY THYROID STIM HORMONE: CPT | Performed by: FAMILY MEDICINE

## 2024-03-01 PROCEDURE — 96127 BRIEF EMOTIONAL/BEHAV ASSMT: CPT | Performed by: FAMILY MEDICINE

## 2024-03-01 PROCEDURE — 82043 UR ALBUMIN QUANTITATIVE: CPT | Performed by: FAMILY MEDICINE

## 2024-03-01 RX ORDER — LEVOTHYROXINE SODIUM 137 UG/1
137 TABLET ORAL DAILY
Qty: 90 TABLET | Refills: 4 | Status: SHIPPED | OUTPATIENT
Start: 2024-03-01 | End: 2024-03-03

## 2024-03-01 RX ORDER — MELOXICAM 15 MG/1
15 TABLET ORAL DAILY
Qty: 90 TABLET | Refills: 4 | Status: SHIPPED | OUTPATIENT
Start: 2024-03-01

## 2024-03-01 RX ORDER — TRIAMCINOLONE ACETONIDE 40 MG/ML
10 INJECTION, SUSPENSION INTRA-ARTICULAR; INTRAMUSCULAR ONCE
Status: COMPLETED | OUTPATIENT
Start: 2024-03-01 | End: 2024-03-01

## 2024-03-01 RX ORDER — TOLTERODINE 2 MG/1
2 CAPSULE, EXTENDED RELEASE ORAL DAILY
Qty: 90 CAPSULE | Refills: 4 | Status: SHIPPED | OUTPATIENT
Start: 2024-03-01

## 2024-03-01 RX ORDER — ESCITALOPRAM OXALATE 20 MG/1
20 TABLET ORAL DAILY
Qty: 90 TABLET | Refills: 4 | Status: SHIPPED | OUTPATIENT
Start: 2024-03-01

## 2024-03-01 RX ORDER — LISINOPRIL 10 MG/1
10 TABLET ORAL DAILY
Qty: 90 TABLET | Refills: 4 | Status: SHIPPED | OUTPATIENT
Start: 2024-03-01

## 2024-03-01 RX ADMIN — TRIAMCINOLONE ACETONIDE 10 MG: 40 INJECTION, SUSPENSION INTRA-ARTICULAR; INTRAMUSCULAR at 13:33

## 2024-03-01 ASSESSMENT — ANXIETY QUESTIONNAIRES
8. IF YOU CHECKED OFF ANY PROBLEMS, HOW DIFFICULT HAVE THESE MADE IT FOR YOU TO DO YOUR WORK, TAKE CARE OF THINGS AT HOME, OR GET ALONG WITH OTHER PEOPLE?: NOT DIFFICULT AT ALL
4. TROUBLE RELAXING: NOT AT ALL
3. WORRYING TOO MUCH ABOUT DIFFERENT THINGS: NOT AT ALL
7. FEELING AFRAID AS IF SOMETHING AWFUL MIGHT HAPPEN: NOT AT ALL
IF YOU CHECKED OFF ANY PROBLEMS ON THIS QUESTIONNAIRE, HOW DIFFICULT HAVE THESE PROBLEMS MADE IT FOR YOU TO DO YOUR WORK, TAKE CARE OF THINGS AT HOME, OR GET ALONG WITH OTHER PEOPLE: NOT DIFFICULT AT ALL
7. FEELING AFRAID AS IF SOMETHING AWFUL MIGHT HAPPEN: NOT AT ALL
GAD7 TOTAL SCORE: 0
5. BEING SO RESTLESS THAT IT IS HARD TO SIT STILL: NOT AT ALL
2. NOT BEING ABLE TO STOP OR CONTROL WORRYING: NOT AT ALL
GAD7 TOTAL SCORE: 0
1. FEELING NERVOUS, ANXIOUS, OR ON EDGE: NOT AT ALL
6. BECOMING EASILY ANNOYED OR IRRITABLE: NOT AT ALL
GAD7 TOTAL SCORE: 0

## 2024-03-01 ASSESSMENT — PATIENT HEALTH QUESTIONNAIRE - PHQ9
SUM OF ALL RESPONSES TO PHQ QUESTIONS 1-9: 3
10. IF YOU CHECKED OFF ANY PROBLEMS, HOW DIFFICULT HAVE THESE PROBLEMS MADE IT FOR YOU TO DO YOUR WORK, TAKE CARE OF THINGS AT HOME, OR GET ALONG WITH OTHER PEOPLE: NOT DIFFICULT AT ALL
SUM OF ALL RESPONSES TO PHQ QUESTIONS 1-9: 3

## 2024-03-02 LAB
ALBUMIN SERPL BCG-MCNC: 4.2 G/DL (ref 3.5–5.2)
ALP SERPL-CCNC: 70 U/L (ref 40–150)
ALT SERPL W P-5'-P-CCNC: 12 U/L (ref 0–50)
ANION GAP SERPL CALCULATED.3IONS-SCNC: 7 MMOL/L (ref 7–15)
AST SERPL W P-5'-P-CCNC: 18 U/L (ref 0–45)
BILIRUB SERPL-MCNC: 0.6 MG/DL
BUN SERPL-MCNC: 23.6 MG/DL (ref 6–20)
CALCIUM SERPL-MCNC: 10.4 MG/DL (ref 8.6–10)
CHLORIDE SERPL-SCNC: 105 MMOL/L (ref 98–107)
CHOLEST SERPL-MCNC: 203 MG/DL
CREAT SERPL-MCNC: 0.79 MG/DL (ref 0.51–0.95)
CREAT UR-MCNC: 102 MG/DL
DEPRECATED HCO3 PLAS-SCNC: 28 MMOL/L (ref 22–29)
EGFRCR SERPLBLD CKD-EPI 2021: 89 ML/MIN/1.73M2
FASTING STATUS PATIENT QL REPORTED: YES
GLUCOSE SERPL-MCNC: 88 MG/DL (ref 70–99)
HDLC SERPL-MCNC: 41 MG/DL
LDLC SERPL CALC-MCNC: 145 MG/DL
MICROALBUMIN UR-MCNC: <12 MG/L
MICROALBUMIN/CREAT UR: NORMAL MG/G{CREAT}
NONHDLC SERPL-MCNC: 162 MG/DL
POTASSIUM SERPL-SCNC: 4.7 MMOL/L (ref 3.4–5.3)
PROT SERPL-MCNC: 6.9 G/DL (ref 6.4–8.3)
SODIUM SERPL-SCNC: 140 MMOL/L (ref 135–145)
T4 FREE SERPL-MCNC: 1.61 NG/DL (ref 0.9–1.7)
TRIGL SERPL-MCNC: 87 MG/DL
TSH SERPL DL<=0.005 MIU/L-ACNC: 0.14 UIU/ML (ref 0.3–4.2)

## 2024-03-03 PROBLEM — N39.41 URGENCY INCONTINENCE: Status: ACTIVE | Noted: 2024-03-03

## 2024-03-03 PROBLEM — E83.52 HYPERCALCEMIA: Status: ACTIVE | Noted: 2024-03-03

## 2024-03-03 PROBLEM — E66.01 SEVERE OBESITY (BMI 35.0-39.9) WITH COMORBIDITY (H): Status: ACTIVE | Noted: 2023-07-11

## 2024-03-03 PROBLEM — F41.9 ANXIETY: Status: ACTIVE | Noted: 2018-08-21

## 2024-03-03 RX ORDER — LEVOTHYROXINE SODIUM 125 UG/1
125 TABLET ORAL DAILY
Qty: 90 TABLET | Refills: 4 | Status: SHIPPED | OUTPATIENT
Start: 2024-03-03 | End: 2024-05-12

## 2024-03-04 ENCOUNTER — TELEPHONE (OUTPATIENT)
Dept: FAMILY MEDICINE | Facility: CLINIC | Age: 54
End: 2024-03-04
Payer: COMMERCIAL

## 2024-03-04 LAB — VIT D+METAB SERPL-MCNC: 70 NG/ML (ref 20–50)

## 2024-03-04 NOTE — RESULT ENCOUNTER NOTE
Dearosio Schreiber,    Here is a summary of your recent test results:  -Normal red blood cell (hgb) levels, normal white blood cell count and normal platelet levels.  -Lipid panel: The ASCVD risk score returns the percentage likelihood of a first time Atherosclerotic Cardiovascular Disease (ASCVD) event.    The 10-year ASCVD risk score (Jacinta ADAMS, et al., 2019) is: 2.6%    Values used to calculate the score:      Age: 53 years      Sex: Female      Is Non- : No      Diabetic: No      Tobacco smoker: No      Systolic Blood Pressure: 118 mmHg      Is BP treated: Yes      HDL Cholesterol: 41 mg/dL      Total Cholesterol: 203 mg/dL    -2.6% of patients that have a similar cholesterol profile to you will have a stroke, heart attack or death (related to heart disease) within the next 10 years and that is considered a low risk and cholesterol lowering medications are not  recommended at this time (high risk is >10%, or >7.5% if other risk factors such has high blood pressure or other heart disease risk factors).    -LDL(bad) cholesterol level is elevated, HDL(good) cholesterol level is low which can increase your heart disease risk.  A diet high in fat and simple carbohydrates, genetics and being overweight can contribute to this. ADVISE: exercising 150 minutes of aerobic exercise per week (30 minutes for 5 days per week or 50 minutes for 3 days per week are options) and eating a low saturated fat/low carbohydrate diet are helpful to improve this.  -Liver and gallbladder tests (ALT,AST, Alk phos,bilirubin) are normal.  -Kidney function (GFR) is normal.  -Sodium is normal.  -Potassium is normal.  -Calcium is elevated.  ADVISE: rechecking this in 1-2 weeks with a lab only appointment which you can schedule through "[facility] [facility].  -Glucose (diabetic screening test) is normal.  -TSH (thyroid stimulating hormone) is abnormal suggesting you are currently overreplaced.  ADVISE: changing your medication dose to 125  micrograms/day (a prescription has been sent to your pharmacy) and rechecking your TSH with a lab appointment in 6-8 weeks.  -Microalbumin (urine protein) test is normal.  ADVISE: rechecking this annually.    For additional lab test information, www.[email or web address].com is a very good reference.    In addition, here is a list of due or overdue Health Maintenance reminders:  Zoster (Shingles) Vaccine(1 of 2) Never done    Please call us at [phone] (or use [employer]) to address the above recommendations if needed.           Thank you very much for trusting me and [facility].     Have a peaceful day.    Healthy regards,  Sonny Conde MD

## 2024-03-04 NOTE — TELEPHONE ENCOUNTER
Prior Authorization Retail Medication Request    Medication/Dose: Wegovy 1MG/0.5ML Auto-injectors  Diagnosis and ICD code (if different than what is on RX):    New/renewal/insurance change PA/secondary ins. PA:  Previously Tried and Failed:    Rationale:      Insurance   Primary: [employer]  Insurance ID:  [id number]     Secondary (if applicable):  Insurance ID:      Pharmacy Information (if different than what is on RX)  Name:  [facility] [place]  Phone:  [phone]  Fax:[phone]

## 2024-03-05 NOTE — RESULT ENCOUNTER NOTE
Dear Candie,    Here is a summary of your recent test results:  -Vitamin D level is slightly above the upper limit of normal and getting 1000 -200IU daily in your diet or supplements is recommended.     For additional lab test information, www.[email or web address].com is a very good reference.    In addition, here is a list of due or overdue Health Maintenance reminders:  Zoster (Shingles) Vaccine(1 of 2) Never done    Please call us at [phone] (or use [email or web address]) to address the above recommendations if needed.           Thank you very much for trusting me and [facility].     Have a peaceful day.    Healthy regards,  Sonny Conde MD

## 2024-03-06 LAB — 1,25(OH)2D SERPL-MCNC: 55.2 PG/ML (ref 19.9–79.3)

## 2024-03-06 NOTE — RESULT ENCOUNTER NOTE
Dear Candie,    Here is a summary of your recent test results:  -Vitamin D level is slightly elevated and getting 1000 IU daily in your diet or supplements is recommended.     For additional lab test information, www.[email or web address] is a very good reference.    In addition, here is a list of due or overdue Health Maintenance reminders:  Zoster (Shingles) Vaccine(1 of 2) Never done    Please call us at [phone] (or use [employer]) to address the above recommendations if needed.           Thank you very much for trusting me and [facility].     Have a peaceful day.    Healthy regards,  Sonny Conde MD

## 2024-03-06 NOTE — TELEPHONE ENCOUNTER
Retail Pharmacy Prior Authorization Team   Phone: [phone]      PRIOR AUTHORIZATION DENIED    Medication: WEGOVY 1 MG/0.5ML SC SOAJ  Insurance Company: [employer] - Phone [phone] Fax [phone]  Denial Date: 3/1/2024  Denial Reason(s):         Appeal Information: Excluded medications cannot be appealed. This class of medication and the medication itself is not on the formulary and there is no instance in which the insurance will approve the medication.      Patient Notified: No. Please note: Providers/Clinics are to notify the patients of the denial outcomes and steps going forward.

## 2024-03-07 NOTE — TELEPHONE ENCOUNTER
Wegovy is not covered by insurance.  There are some online options such as     [email or web address]    Or    [employer] [facility] [email or web address]

## 2024-03-12 ENCOUNTER — TELEPHONE (OUTPATIENT)
Dept: FAMILY MEDICINE | Facility: CLINIC | Age: 54
End: 2024-03-12

## 2024-03-12 ENCOUNTER — MYC MEDICAL ADVICE (OUTPATIENT)
Dept: FAMILY MEDICINE | Facility: CLINIC | Age: 54
End: 2024-03-12

## 2024-03-12 DIAGNOSIS — E66.01 SEVERE OBESITY (BMI 35.0-39.9) WITH COMORBIDITY (H): ICD-10-CM

## 2024-03-12 NOTE — TELEPHONE ENCOUNTER
Prior Authorization Retail Medication Request    Medication/Dose: Wegovy 1MG/0.5ML Auto-injectors  Diagnosis and ICD code (if different than what is on RX):    New/renewal/insurance change PA/secondary ins. PA:  Previously Tried and Failed:    Rationale:      Insurance   Primary: [employer]  Insurance ID:  [id number]     Secondary (if applicable):  Insurance ID:      Pharmacy Information (if different than what is on RX)  Name:  [facility]  Phone:  [phone]  Fax:[phone]

## 2024-03-15 ENCOUNTER — TELEPHONE (OUTPATIENT)
Dept: FAMILY MEDICINE | Facility: CLINIC | Age: 54
End: 2024-03-15

## 2024-03-15 ENCOUNTER — LAB (OUTPATIENT)
Dept: LAB | Facility: CLINIC | Age: 54
End: 2024-03-15
Attending: FAMILY MEDICINE
Payer: COMMERCIAL

## 2024-03-15 DIAGNOSIS — E83.52 HYPERCALCEMIA: Primary | ICD-10-CM

## 2024-03-15 DIAGNOSIS — E03.9 HYPOTHYROIDISM, UNSPECIFIED TYPE: ICD-10-CM

## 2024-03-15 DIAGNOSIS — E83.52 HYPERCALCEMIA: ICD-10-CM

## 2024-03-15 LAB — CA-I BLD-MCNC: 5.5 MG/DL (ref 4.4–5.2)

## 2024-03-15 PROCEDURE — 84165 PROTEIN E-PHORESIS SERUM: CPT | Performed by: PATHOLOGY

## 2024-03-15 PROCEDURE — 99000 SPECIMEN HANDLING OFFICE-LAB: CPT

## 2024-03-15 PROCEDURE — 83735 ASSAY OF MAGNESIUM: CPT

## 2024-03-15 PROCEDURE — 84439 ASSAY OF FREE THYROXINE: CPT

## 2024-03-15 PROCEDURE — 84443 ASSAY THYROID STIM HORMONE: CPT

## 2024-03-15 PROCEDURE — 82542 COL CHROMOTOGRAPHY QUAL/QUAN: CPT | Mod: 90

## 2024-03-15 PROCEDURE — 84481 FREE ASSAY (FT-3): CPT

## 2024-03-15 PROCEDURE — 84155 ASSAY OF PROTEIN SERUM: CPT

## 2024-03-15 PROCEDURE — 36415 COLL VENOUS BLD VENIPUNCTURE: CPT

## 2024-03-15 PROCEDURE — 83970 ASSAY OF PARATHORMONE: CPT

## 2024-03-15 PROCEDURE — 82330 ASSAY OF CALCIUM: CPT

## 2024-03-15 NOTE — TELEPHONE ENCOUNTER
Pt asks for Magnesium to be added to lab order. Received verbal order from provider Radha Garsia to add.

## 2024-03-16 LAB
CA-I BLD-MCNC: 5.3 MG/DL (ref 4.4–5.2)
MAGNESIUM SERPL-MCNC: 2.3 MG/DL (ref 1.7–2.3)
PTH-INTACT SERPL-MCNC: 31 PG/ML (ref 15–65)
T3FREE SERPL-MCNC: 2.6 PG/ML (ref 2–4.4)
T4 FREE SERPL-MCNC: 1.86 NG/DL (ref 0.9–1.7)
TOTAL PROTEIN SERUM FOR ELP: 6.7 G/DL (ref 6.4–8.3)
TSH SERPL DL<=0.005 MIU/L-ACNC: 0.08 UIU/ML (ref 0.3–4.2)

## 2024-03-18 LAB
ALBUMIN SERPL ELPH-MCNC: 4.2 G/DL (ref 3.7–5.1)
ALPHA1 GLOB SERPL ELPH-MCNC: 0.3 G/DL (ref 0.2–0.4)
ALPHA2 GLOB SERPL ELPH-MCNC: 0.5 G/DL (ref 0.5–0.9)
B-GLOBULIN SERPL ELPH-MCNC: 0.9 G/DL (ref 0.6–1)
GAMMA GLOB SERPL ELPH-MCNC: 0.9 G/DL (ref 0.7–1.6)
LOCATION OF TASK: NORMAL
M PROTEIN SERPL ELPH-MCNC: 0 G/DL
PROT PATTERN SERPL ELPH-IMP: NORMAL
PTH RELATED PROT SERPL-SCNC: <2 PMOL/L

## 2024-03-19 NOTE — RESULT ENCOUNTER NOTE
Dear Candie,    Here is a summary of your recent test results:  -Calcium is near normal now.  -Parathyroid hormone level and other tests are normal.    -TSH was supposed to be rechecked in 6 weeks after the adjustment of your levothyroxine dose.  You should make a follow-up lab only appointment in about 1 to 2 months.    For additional lab test information, [email or web address] is a very good reference.    In addition, here is a list of due or overdue Health Maintenance reminders:  Zoster (Shingles) Vaccine(1 of 2) Never done    Please call us at [phone] (or use [employer]) to address the above recommendations if needed.           Thank you very much for trusting me and [facility].     Have a peaceful day.    Healthy regards,  Sonny Conde MD

## 2024-03-19 NOTE — RESULT ENCOUNTER NOTE
Dear Candie,    Here is a summary of your recent test results:  -Parathyroid related hormone level is normal.     For additional lab test information, www.[email or web address].com is a very good reference.    In addition, here is a list of due or overdue Health Maintenance reminders:  Zoster (Shingles) Vaccine(1 of 2) Never done    Please call us at [phone] (or use [employer]) to address the above recommendations if needed.           Thank you very much for trusting me and [facility].     Have a peaceful day.    Healthy regards,  Sonny Conde MD

## 2024-03-20 ENCOUNTER — MYC MEDICAL ADVICE (OUTPATIENT)
Dept: FAMILY MEDICINE | Facility: CLINIC | Age: 54
End: 2024-03-20
Payer: COMMERCIAL

## 2024-03-20 DIAGNOSIS — E66.01 SEVERE OBESITY (BMI 35.0-39.9) WITH COMORBIDITY (H): ICD-10-CM

## 2024-03-20 NOTE — TELEPHONE ENCOUNTER
Please see 3/4/24 encounter. Insurance has not changed since then. Weight loss medications are a plan exclusion and cannot be appealed.     Thank you,     Romulo Dorantes [facility]  Pharmacy Clinic Liaison   [email or web address] [email or web address]   Phone: [phone]  Fax: [phone]

## 2024-03-22 ENCOUNTER — TELEPHONE (OUTPATIENT)
Dept: FAMILY MEDICINE | Facility: CLINIC | Age: 54
End: 2024-03-22

## 2024-03-22 ENCOUNTER — TELEPHONE (OUTPATIENT)
Dept: FAMILY MEDICINE | Facility: CLINIC | Age: 54
End: 2024-03-22
Payer: COMMERCIAL

## 2024-03-22 NOTE — TELEPHONE ENCOUNTER
Medication Question or Refill    wegovy vs Semaglutide    What medication are you calling about (include dose and sig)?: [facility] - Cyanocobl / Semaglutide/cyanocobalam This pharmacy does not carry Wegovy.    Preferred Pharmacy:       [facility] - [place], [place] - [address]  [address] [address]  Phone: [phone] Fax: [phone]      Controlled Substance Agreement on file:   CSA -- Patient Level:    CSA: None found at the patient level.       Who prescribed the medication?: Amaya    Do you need a refill? Yes    When did you use the medication last? na    Patient offered an appointment? No    Do you have any questions or concerns?  No      Could we send this information to you in [facility] or would you prefer to receive a phone call?:   anna    Put in providers in box    Jaci PETE

## 2024-03-22 NOTE — TELEPHONE ENCOUNTER
A new prescription has been sent to the pharmacy as well as form signed that they sent to.  
Rosalba from [facility] is calling to report to PCP that they do not provide semaglutide auto pen injections. This pharmacy only has the compounded version which is a multi-dose vial. If PCP is okay with compounded version, a new rx needs to be sent with vial strength and mL for multi-dose. If not, need to send rx to another pharmacy. Please advise next steps.      
not applicable

## 2024-03-25 NOTE — TELEPHONE ENCOUNTER
Form completed and reviewed by Dr Conde  Also sent by [employer]  Faxed back to [phone]  Filed in [facility].

## 2024-04-04 ENCOUNTER — TRANSFERRED RECORDS (OUTPATIENT)
Dept: HEALTH INFORMATION MANAGEMENT | Facility: CLINIC | Age: 54
End: 2024-04-04
Payer: COMMERCIAL

## 2024-04-12 ENCOUNTER — TELEPHONE (OUTPATIENT)
Dept: FAMILY MEDICINE | Facility: CLINIC | Age: 54
End: 2024-04-12
Payer: COMMERCIAL

## 2024-04-12 DIAGNOSIS — E66.01 SEVERE OBESITY (BMI 35.0-39.9) WITH COMORBIDITY (H): Primary | ICD-10-CM

## 2024-04-12 NOTE — TELEPHONE ENCOUNTER
Prior Authorization Retail Medication Request    Medication/Dose: Wegovy 1mg/0.5ml auto injectors  Diagnosis and ICD code (if different than what is on RX):    New/renewal/insurance change PA/secondary ins. PA:  Previously Tried and Failed:    Rationale:      Insurance   Primary:   Insurance ID:      Secondary (if applicable):  Insurance ID:      Pharmacy Information (if different than what is on RX)  Name:  [facility]  Phone:  [phone]  Fax:[phone]      Started on cover my meds    Key [id number]

## 2024-04-25 NOTE — TELEPHONE ENCOUNTER
Central Prior Authorization Team   Phone: [phone]    PA Initiation    Medication: Wegovy 1mg/0.5ml auto injectors  Insurance Company: [employer] - Phone [phone] Fax [phone]  Pharmacy Filling the Rx: [facility] - [place], [place] - [address] [facility]  Filling Pharmacy Phone: [phone]  Filling Pharmacy Fax:    Start Date: 4/25/2024

## 2024-04-26 NOTE — TELEPHONE ENCOUNTER
PRIOR AUTHORIZATION DENIED    Medication: Wegovy 1mg/0.5ml auto injectors    Denial Date: 4/26/2024    Denial Rational: Medication is a plan exclusion           Appeal Information:  Drug exclusions can not be appealed.  This medication will not be covered by the prescription plan for any reason. The drug is not on formulary and there are no loopholes to gaining approval.

## 2024-05-10 ENCOUNTER — LAB (OUTPATIENT)
Dept: LAB | Facility: CLINIC | Age: 54
End: 2024-05-10
Attending: FAMILY MEDICINE
Payer: COMMERCIAL

## 2024-05-10 DIAGNOSIS — E03.9 HYPOTHYROIDISM, UNSPECIFIED TYPE: ICD-10-CM

## 2024-05-10 PROCEDURE — 84443 ASSAY THYROID STIM HORMONE: CPT

## 2024-05-10 PROCEDURE — 36415 COLL VENOUS BLD VENIPUNCTURE: CPT

## 2024-05-10 PROCEDURE — 84439 ASSAY OF FREE THYROXINE: CPT

## 2024-05-10 PROCEDURE — 84481 FREE ASSAY (FT-3): CPT

## 2024-05-11 LAB
T3FREE SERPL-MCNC: 2.6 PG/ML (ref 2–4.4)
T4 FREE SERPL-MCNC: 1.59 NG/DL (ref 0.9–1.7)
TSH SERPL DL<=0.005 MIU/L-ACNC: 0.07 UIU/ML (ref 0.3–4.2)

## 2024-05-13 RX ORDER — LEVOTHYROXINE SODIUM 112 UG/1
112 TABLET ORAL DAILY
Qty: 90 TABLET | Refills: 4 | Status: SHIPPED | OUTPATIENT
Start: 2024-05-12 | End: 2024-07-18

## 2024-05-13 NOTE — RESULT ENCOUNTER NOTE
Dear Candie,    Here is a summary of your recent test results:  -TSH (thyroid stimulating hormone) is abnormal suggesting you are currently overreplaced.  ADVISE: changing your medication dose to 112 micrograms/day (a prescription has been sent to your pharmacy) and rechecking your TSH with a lab appointment in 6-8 weeks.    For additional lab test information, www.[email or web address].com is a very good reference.    In addition, here is a list of due or overdue Health Maintenance reminders:  Zoster (Shingles) Vaccine(1 of 2) Never done    Please call us at [phone] (or use [email or web address]) to address the above recommendations if needed.           Thank you very much for trusting me and [facility].     Have a peaceful day.    Healthy regards,  Sonny Conde MD

## 2024-05-14 ENCOUNTER — MYC MEDICAL ADVICE (OUTPATIENT)
Dept: FAMILY MEDICINE | Facility: CLINIC | Age: 54
End: 2024-05-14
Payer: COMMERCIAL

## 2024-05-15 NOTE — TELEPHONE ENCOUNTER
Please see my chart message below     Please review and advise     Thank you     Ciera Ahumada RN, BSN  [place] [facility]

## 2024-06-03 ENCOUNTER — TELEPHONE (OUTPATIENT)
Dept: FAMILY MEDICINE | Facility: CLINIC | Age: 54
End: 2024-06-03
Payer: COMMERCIAL

## 2024-06-03 ENCOUNTER — TRANSFERRED RECORDS (OUTPATIENT)
Dept: HEALTH INFORMATION MANAGEMENT | Facility: CLINIC | Age: 54
End: 2024-06-03
Payer: COMMERCIAL

## 2024-06-03 NOTE — TELEPHONE ENCOUNTER
Prior Authorization Retail Medication Request    Medication/Dose: WEGOVY 1MG/0.5ML  Diagnosis and ICD code (if different than what is on RX):    New/renewal/insurance change PA/secondary ins. PA:  Previously Tried and Failed:    Rationale:      Insurance   Primary: [employer]   Insurance ID:  [id number]     Secondary (if applicable):  Insurance ID:      Pharmacy Information (if different than what is on RX)  Name:  [facility]  Phone:  [phone]  [phone]

## 2024-06-03 NOTE — TELEPHONE ENCOUNTER
Patient called stating she received a message from [facility] pharmacy that she should contact her doctors office.     Explained prior authorization encounter.   Patient states she is going to pay out of pocket, explained pa will be accepted or denied.     Updated pa not to say [facility] pharmacy, patient is not sure why [facility] is listed. - updated note.         Patient is going to call [facility] pharmacy.      Disp Refills Start End GEO   COMPOUNDED NON-CONTROLLED SUBSTANCE (CMPD RX) - PHARMACY TO MIX COMPOUNDED MEDICATION 1 mL 11 3/22/2024 -- No   Sig: Semaglutide/cyanocobalamin 5mg/0.5mg/ml - inject 0.2ml (1mg/0.1mg) subcutaneaous weekly   Sent to pharmacy as: COMPOUNDED NON-CONTROLLED SUBSTANCE - PHARMACY TO MIX COMPOUNDED MEDICATION   Class: E-Prescribe   Order: [id number]   E-Prescribing Status: Receipt confirmed by pharmacy (3/22/2024  3:07 PM CDT)     Semaglutide/cyanocobalamin 5mg/0.5mg/ml - inject 0.2ml (1mg/0.1mg) subcutaneaous weekly       [facility] - [place], [place] - [address] [facility]

## 2024-06-03 NOTE — TELEPHONE ENCOUNTER
Patient called back-see 6/3 telephone encounter.     [facility] - [place], [place] [facility] [address] [facility]

## 2024-06-13 ENCOUNTER — TRANSFERRED RECORDS (OUTPATIENT)
Dept: HEALTH INFORMATION MANAGEMENT | Facility: CLINIC | Age: 54
End: 2024-06-13
Payer: COMMERCIAL

## 2024-06-26 ENCOUNTER — TELEPHONE (OUTPATIENT)
Dept: FAMILY MEDICINE | Facility: CLINIC | Age: 54
End: 2024-06-26
Payer: COMMERCIAL

## 2024-06-26 NOTE — TELEPHONE ENCOUNTER
Prior Authorization Retail Medication Request    Medication/Dose: Wegovy 1MG/0.5ML Auto-injectors  Diagnosis and ICD code (if different than what is on RX):    New/renewal/insurance change PA/secondary ins. PA:  Previously Tried and Failed:    Rationale:      Insurance   Primary: [employer]  Insurance ID:  [id number]     Secondary (if applicable):  Insurance ID:      Pharmacy Information (if different than what is on RX)  Name:  [facility]  Phone:  [phone]  Fax:[phone]

## 2024-07-03 ENCOUNTER — TRANSFERRED RECORDS (OUTPATIENT)
Dept: HEALTH INFORMATION MANAGEMENT | Facility: CLINIC | Age: 54
End: 2024-07-03
Payer: COMMERCIAL

## 2024-07-15 ENCOUNTER — LAB (OUTPATIENT)
Dept: LAB | Facility: CLINIC | Age: 54
End: 2024-07-15
Attending: FAMILY MEDICINE
Payer: COMMERCIAL

## 2024-07-15 DIAGNOSIS — E03.9 HYPOTHYROIDISM, UNSPECIFIED TYPE: ICD-10-CM

## 2024-07-15 DIAGNOSIS — E83.52 HYPERCALCEMIA: ICD-10-CM

## 2024-07-15 PROCEDURE — 84481 FREE ASSAY (FT-3): CPT

## 2024-07-15 PROCEDURE — 84439 ASSAY OF FREE THYROXINE: CPT

## 2024-07-15 PROCEDURE — 84443 ASSAY THYROID STIM HORMONE: CPT

## 2024-07-15 PROCEDURE — 36415 COLL VENOUS BLD VENIPUNCTURE: CPT

## 2024-07-16 ENCOUNTER — APPOINTMENT (OUTPATIENT)
Dept: LAB | Facility: CLINIC | Age: 54
End: 2024-07-16
Payer: COMMERCIAL

## 2024-07-16 LAB
T3FREE SERPL-MCNC: 2.6 PG/ML (ref 2–4.4)
T4 FREE SERPL-MCNC: 1.53 NG/DL (ref 0.9–1.7)
TSH SERPL DL<=0.005 MIU/L-ACNC: 0.08 UIU/ML (ref 0.3–4.2)

## 2024-07-16 PROCEDURE — 81050 URINALYSIS VOLUME MEASURE: CPT | Performed by: PATHOLOGY

## 2024-07-16 PROCEDURE — 84166 PROTEIN E-PHORESIS/URINE/CSF: CPT | Performed by: PATHOLOGY

## 2024-07-18 LAB — PROT PATTERN UR ELPH-IMP: NORMAL

## 2024-07-18 RX ORDER — LEVOTHYROXINE SODIUM 100 UG/1
100 TABLET ORAL DAILY
Qty: 90 TABLET | Refills: 4 | Status: SHIPPED | OUTPATIENT
Start: 2024-07-18

## 2024-07-19 NOTE — RESULT ENCOUNTER NOTE
Dear Candie,    Here is a summary of your recent test results:  -TSH (thyroid stimulating hormone) is abnormal suggesting you are currently overreplaced.  ADVISE: changing your medication dose to 100 micrograms/day (a prescription has been sent to your pharmacy) and rechecking your TSH with a lab appointment in 6-8 weeks.  -Urine protein electrophoresis was normal.    For additional lab test information, www.[email or web address] is a very good reference.           Thank you very much for trusting me and [facility].     Have a peaceful day.    Healthy regards,  Sonny Conde MD

## 2024-08-29 ENCOUNTER — TELEPHONE (OUTPATIENT)
Dept: FAMILY MEDICINE | Facility: CLINIC | Age: 54
End: 2024-08-29
Payer: COMMERCIAL

## 2024-08-29 NOTE — TELEPHONE ENCOUNTER
Prior Authorization Retail Medication Request    Medication/Dose: Wegovy 1MG/0.5ML Auto Injectors  Diagnosis and ICD code (if different than what is on RX):  NA   New/renewal/insurance change PA/secondary ins. PA: NA  Previously Tried and Failed:  JAY JAY  Rationale:  NA    Insurance   Primary: [employer] ACCESS   Insurance ID:   [id number]         Pharmacy Information (if different than what is on RX)  Name:  [facility]  Phone:  [phone]  Fax:[phone]

## 2024-08-30 NOTE — TELEPHONE ENCOUNTER
PA Initiation    Medication: WEGOVY 1 MG/0.5ML SC SOAJ  Insurance Company: [employer] - Phone [phone] Fax [phone]  Pharmacy Filling the Rx: [facility] - [place], [place] - [address] [facility]  Filling Pharmacy Phone: [phone]  Filling Pharmacy Fax: [phone]  Start Date: 8/30/2024

## 2024-08-30 NOTE — TELEPHONE ENCOUNTER
PRIOR AUTHORIZATION DENIED    Medication: WEGOVY 1 MG/0.5ML SC SOAJ    Insurance Company: [employer] - Phone [phone] Fax [phone]    Denial Date: 8/30/2024    Denial Reason(s): Excluded    Appeal Information:

## 2024-09-04 ENCOUNTER — MYC MEDICAL ADVICE (OUTPATIENT)
Dept: FAMILY MEDICINE | Facility: CLINIC | Age: 54
End: 2024-09-04
Payer: COMMERCIAL

## 2024-09-04 DIAGNOSIS — E03.9 HYPOTHYROIDISM, UNSPECIFIED TYPE: Primary | ICD-10-CM

## 2024-09-24 ENCOUNTER — MYC MEDICAL ADVICE (OUTPATIENT)
Dept: FAMILY MEDICINE | Facility: CLINIC | Age: 54
End: 2024-09-24
Payer: COMMERCIAL

## 2024-09-24 DIAGNOSIS — E83.52 HYPERCALCEMIA: ICD-10-CM

## 2024-09-24 DIAGNOSIS — E03.9 HYPOTHYROIDISM, UNSPECIFIED TYPE: Primary | ICD-10-CM

## 2024-09-26 NOTE — TELEPHONE ENCOUNTER
Please print and fax referral to:    [facility]  Dr. Demarcus Parrish  [address] [address]  [phone]  Fax [phone]

## 2024-10-11 ENCOUNTER — MYC REFILL (OUTPATIENT)
Dept: FAMILY MEDICINE | Facility: CLINIC | Age: 54
End: 2024-10-11
Payer: COMMERCIAL

## 2024-10-11 ENCOUNTER — MYC MEDICAL ADVICE (OUTPATIENT)
Dept: FAMILY MEDICINE | Facility: CLINIC | Age: 54
End: 2024-10-11
Payer: COMMERCIAL

## 2024-10-11 DIAGNOSIS — E83.52 HYPERCALCEMIA: Primary | ICD-10-CM

## 2024-10-11 DIAGNOSIS — H10.13 ALLERGIC CONJUNCTIVITIS, BILATERAL: ICD-10-CM

## 2024-10-11 DIAGNOSIS — I10 HYPERTENSION GOAL BP (BLOOD PRESSURE) < 130/80: ICD-10-CM

## 2024-10-12 ENCOUNTER — MYC REFILL (OUTPATIENT)
Dept: LAB | Facility: CLINIC | Age: 54
End: 2024-10-12

## 2024-10-12 ENCOUNTER — LAB (OUTPATIENT)
Dept: LAB | Facility: CLINIC | Age: 54
End: 2024-10-12
Payer: COMMERCIAL

## 2024-10-12 DIAGNOSIS — I10 HYPERTENSION GOAL BP (BLOOD PRESSURE) < 130/80: ICD-10-CM

## 2024-10-12 DIAGNOSIS — E03.9 HYPOTHYROIDISM, UNSPECIFIED TYPE: ICD-10-CM

## 2024-10-12 DIAGNOSIS — E83.52 HYPERCALCEMIA: ICD-10-CM

## 2024-10-12 LAB
T3FREE SERPL-MCNC: 2.6 PG/ML (ref 2–4.4)
T4 FREE SERPL-MCNC: 1.38 NG/DL (ref 0.9–1.7)
TSH SERPL DL<=0.005 MIU/L-ACNC: 0.38 UIU/ML (ref 0.3–4.2)

## 2024-10-12 PROCEDURE — 36415 COLL VENOUS BLD VENIPUNCTURE: CPT

## 2024-10-12 PROCEDURE — 82306 VITAMIN D 25 HYDROXY: CPT

## 2024-10-12 PROCEDURE — 84443 ASSAY THYROID STIM HORMONE: CPT

## 2024-10-12 PROCEDURE — 83550 IRON BINDING TEST: CPT

## 2024-10-12 PROCEDURE — 84439 ASSAY OF FREE THYROXINE: CPT

## 2024-10-12 PROCEDURE — 83540 ASSAY OF IRON: CPT

## 2024-10-12 PROCEDURE — 84481 FREE ASSAY (FT-3): CPT

## 2024-10-14 ENCOUNTER — TELEPHONE (OUTPATIENT)
Dept: FAMILY MEDICINE | Facility: CLINIC | Age: 54
End: 2024-10-14
Payer: COMMERCIAL

## 2024-10-14 LAB
IRON BINDING CAPACITY (ROCHE): 318 UG/DL (ref 240–430)
IRON SATN MFR SERPL: 25 % (ref 15–46)
IRON SERPL-MCNC: 79 UG/DL (ref 37–145)
VIT D+METAB SERPL-MCNC: 43 NG/ML (ref 20–50)

## 2024-10-14 RX ORDER — OLOPATADINE HYDROCHLORIDE 1 MG/ML
1 SOLUTION/ DROPS OPHTHALMIC 2 TIMES DAILY
Qty: 5 ML | Refills: 4 | Status: SHIPPED | OUTPATIENT
Start: 2024-10-14

## 2024-10-14 RX ORDER — LEVOTHYROXINE SODIUM 100 UG/1
100 TABLET ORAL DAILY
Qty: 90 TABLET | Refills: 4 | OUTPATIENT
Start: 2024-10-14

## 2024-10-14 NOTE — TELEPHONE ENCOUNTER
Medication Question or Refill        What medication are you calling about (include dose and sig)?:      are any questions or concerns with current medications please address    Preferred Pharmacy:       [id number] - [place]  [address] [place] [address]  [address] [facility] [address]  Phone: [phone] Fax: [phone]      Controlled Substance Agreement on file:   CSA -- Patient Level:    CSA: None found at the patient level.       Who prescribed the medication?: Dr. Conde    Do you need a refill? Yes    When did you use the medication last? na    Patient offered an appointment? No    Do you have any questions or concerns?  No      Could we send this information to you in [employer] or would you prefer to receive a phone call?:   anna    Put in providers in box    Jaci PETE

## 2024-10-17 NOTE — RESULT ENCOUNTER NOTE
Dear Candie,    Here is a summary of your recent test results:  -TSH (thyroid stimulating hormone) level is normal which indicates normal thyroid function.  -Vitamin D level is normal and getting 1000 IU daily in your diet or supplements is recommended.   -Normal iron levels.      For additional lab test information, www.[email or web address] is a very good reference.    In addition, here is a list of due or overdue Health Maintenance reminders:  Zoster (Shingles) Vaccine(1 of 2) Never done  Flu Vaccine(1) due on 09/01/2024  ANNUAL REVIEW OF HM ORDERS due on 11/14/2024    Please call us at [phone] (or use [employer]) to address the above recommendations if needed.           Thank you very much for trusting me and [facility].     Have a peaceful day.    Healthy regards,  Sonny Conde MD

## 2024-10-22 ENCOUNTER — TRANSFERRED RECORDS (OUTPATIENT)
Dept: HEALTH INFORMATION MANAGEMENT | Facility: CLINIC | Age: 54
End: 2024-10-22
Payer: COMMERCIAL

## 2024-12-17 ENCOUNTER — PATIENT OUTREACH (OUTPATIENT)
Dept: CARE COORDINATION | Facility: CLINIC | Age: 54
End: 2024-12-17
Payer: COMMERCIAL

## 2024-12-31 ENCOUNTER — PATIENT OUTREACH (OUTPATIENT)
Dept: CARE COORDINATION | Facility: CLINIC | Age: 54
End: 2024-12-31
Payer: COMMERCIAL

## 2024-12-31 ENCOUNTER — TRANSFERRED RECORDS (OUTPATIENT)
Dept: HEALTH INFORMATION MANAGEMENT | Facility: CLINIC | Age: 54
End: 2024-12-31
Payer: COMMERCIAL

## 2025-01-29 ENCOUNTER — NURSE TRIAGE (OUTPATIENT)
Dept: NURSING | Facility: CLINIC | Age: 55
End: 2025-01-29
Payer: COMMERCIAL

## 2025-01-29 ENCOUNTER — TRANSFERRED RECORDS (OUTPATIENT)
Dept: HEALTH INFORMATION MANAGEMENT | Facility: CLINIC | Age: 55
End: 2025-01-29
Payer: COMMERCIAL

## 2025-01-30 NOTE — TELEPHONE ENCOUNTER
Call from patient who is currently out of state. She is requesting an rx for tamiflu at this time, writer advised patient that because she is not currently in the state of [place] that writer could not complete a triage for her at this time. Writer advised patient to reach out to a local healthcare provider or the number on the back of her insurance card for further assistance. Writer also to route encounter to provider for follow up as well.

## 2025-02-06 NOTE — PROGRESS NOTES
SUBJECTIVE:   CC: Christine Dubose is an 50 year old male who presents for preventive health visit.       HPI    Healthy Habits:    Do you get at least three servings of calcium containing foods daily (dairy, green leafy vegetables, etc.)? Yes, taking supplements     Amount of exercise or daily activities, outside of work: 3 day(s) per week    Problems taking medications regularly No    Medication side effects: No    Have you had an eye exam in the past two years? yes    Do you see a dentist twice per year? yes    Do you have sleep apnea, excessive snoring or daytime drowsiness?no      Hypertension Follow-up      Do you check your blood pressure regularly outside of the clinic? Yes     Are you following a low salt diet? Yes, no added salt     Are your blood pressures ever more than 140 on the top number (systolic) OR more   than 90 on the bottom number (diastolic), for example 140/90? No    Anxiety Follow-Up    How are you doing with your anxiety since your last visit? No change    Are you having other symptoms that might be associated with anxiety? No    Have you had a significant life event? No     Are you feeling depressed? No    Do you have any concerns with your use of alcohol or other drugs? No    Social History     Tobacco Use     Smoking status: Never Smoker     Smokeless tobacco: Never Used   Substance Use Topics     Alcohol use: Yes     Alcohol/week: 0.0 standard drinks     Comment: Socially     Drug use: No     JAYDE-7 SCORE 7/13/2018 7/23/2019 9/11/2020   Total Score - - -   Total Score 0 0 0     PHQ 7/13/2018 7/23/2019 9/11/2020   PHQ-9 Total Score 5 1 5   Q9: Thoughts of better off dead/self-harm past 2 weeks Not at all Not at all Not at all     Last PHQ-9 9/11/2020   1.  Little interest or pleasure in doing things 1   2.  Feeling down, depressed, or hopeless 1   3.  Trouble falling or staying asleep, or sleeping too much 1   4.  Feeling tired or having little energy 1   5.  Poor appetite or overeating 1    6.  Feeling bad about yourself 0   7.  Trouble concentrating 0   8.  Moving slowly or restless 0   Q9: Thoughts of better off dead/self-harm past 2 weeks 0   PHQ-9 Total Score 5   Difficulty at work, home, or with people Not difficult at all     JAYDE-7  9/11/2020   1. Feeling nervous, anxious, or on edge 0   2. Not being able to stop or control worrying 0   3. Worrying too much about different things 0   4. Trouble relaxing 0   5. Being so restless that it is hard to sit still 0   6. Becoming easily annoyed or irritable 0   7. Feeling afraid, as if something awful might happen 0   JAYDE-7 Total Score 0   If you checked any problems, how difficult have they made it for you to do your work, take care of things at home, or get along with other people? Not difficult at all       Hypothyroidism Follow-up      Since last visit, patient describes the following symptoms: Weight stable, no hair loss, no skin changes, no constipation, no loose stools      Today's PHQ-2 Score:   PHQ-2 ( 1999 [employer]) 9/8/2020 9/8/2020   Q1: Little interest or pleasure in doing things 1 1   Q2: Feeling down, depressed or hopeless 1 1   PHQ-2 Score 2 2   Q1: Little interest or pleasure in doing things Several days Several days   Q2: Feeling down, depressed or hopeless Several days Several days   PHQ-2 Score 2 2       Abuse: Current or Past(Physical, Sexual or Emotional)- No  Do you feel safe in your environment? Yes        Social History     Tobacco Use     Smoking status: Never Smoker     Smokeless tobacco: Never Used   Substance Use Topics     Alcohol use: Yes     Alcohol/week: 0.0 standard drinks     Comment: Socially     If you drink alcohol do you typically have >3 drinks per day or >7 drinks per week? No    Reviewed orders with patient. Reviewed health maintenance and updated orders accordingly - Yes      Pertinent mammograms are reviewed under the imaging tab.  History of abnormal Pap smear: NO - age 30-65 PAP every 5 years with negative  "HPV co-testing recommended  PAP / HPV Latest Ref Rng & Units 6/23/2017   PAP - NIL   HPV 16 DNA NEG Negative   HPV 18 DNA NEG Negative   OTHER HR HPV NEG Negative     Reviewed and updated as needed this visit by clinical staff  Tobacco  Allergies  Meds  Problems  Med Hx  Surg Hx  Fam Hx          Reviewed and updated as needed this visit by Provider  Tobacco  Allergies  Meds  Problems  Med Hx  Surg Hx  Fam Hx             ROS:  Constitutional, HEENT, cardiovascular, pulmonary, GI, , musculoskeletal, neuro, skin, endocrine and psych systems are negative, except as otherwise noted.     OBJECTIVE:   /78   Pulse 89   Temp 98  F (36.7  C) (Tympanic)   Ht 1.676 m (5' 6\")   Wt 106.6 kg (235 lb)   LMP 03/17/2014   SpO2 98%   Breastfeeding No   BMI 37.93 kg/m    EXAM:  GENERAL: healthy, alert and no distress  EYES: Eyes grossly normal to inspection, PERRL and conjunctivae and sclerae normal  HENT: ear canals and TM's normal, nose and mouth without ulcers or lesions  NECK: no adenopathy, no asymmetry, masses, or scars and thyroid normal to palpation  RESP: lungs clear to auscultation - no rales, rhonchi or wheezes  BREAST: normal without masses, tenderness or nipple discharge and no palpable axillary masses or adenopathy  CV: regular rate and rhythm, normal S1 S2, no S3 or S4, no murmur, click or rub, no peripheral edema and peripheral pulses strong  ABDOMEN: soft, nontender, no hepatosplenomegaly, no masses and bowel sounds normal  MS: no gross musculoskeletal defects noted, no edema  SKIN: no suspicious lesions or rashes  NEURO: Normal strength and tone, mentation intact and speech normal  PSYCH: mentation appears normal, affect normal/bright  LYMPH: no cervical, supraclavicular, axillary, or inguinal adenopathy  Breast and gyn at [facility]    Results for orders placed or performed in visit on 10/05/20   Lipid panel reflex to direct LDL Fasting     Status: Abnormal   Result Value Ref Range    " Cholesterol 191 <200 mg/dL    Triglycerides 201 (H) <150 mg/dL    HDL Cholesterol 34 (L) >49 mg/dL    LDL Cholesterol Calculated 117 (H) <100 mg/dL    Non HDL Cholesterol 157 (H) <130 mg/dL   Albumin Random Urine Quantitative with Creat Ratio     Status: None   Result Value Ref Range    Creatinine Urine 152 mg/dL    Albumin Urine mg/L 9 mg/L    Albumin Urine mg/g Cr 6.05 0 - 25 mg/g Cr   TSH with free T4 reflex     Status: None   Result Value Ref Range    TSH 0.87 0.40 - 4.00 mU/L   Comprehensive metabolic panel (BMP + Alb, Alk Phos, ALT, AST, Total. Bili, TP)     Status: None   Result Value Ref Range    Sodium 138 133 - 144 mmol/L    Potassium 4.3 3.4 - 5.3 mmol/L    Chloride 107 94 - 109 mmol/L    Carbon Dioxide 26 20 - 32 mmol/L    Anion Gap 5 3 - 14 mmol/L    Glucose 88 70 - 99 mg/dL    Urea Nitrogen 17 7 - 30 mg/dL    Creatinine 0.77 0.52 - 1.04 mg/dL    GFR Estimate 90 >60 mL/min/[1.73_m2]    GFR Estimate If Black >90 >60 mL/min/[1.73_m2]    Calcium 9.8 8.5 - 10.1 mg/dL    Bilirubin Total 0.6 0.2 - 1.3 mg/dL    Albumin 3.6 3.4 - 5.0 g/dL    Protein Total 7.2 6.8 - 8.8 g/dL    Alkaline Phosphatase 86 40 - 150 U/L    ALT 40 0 - 50 U/L    AST 20 0 - 45 U/L   CBC with platelets     Status: None   Result Value Ref Range    WBC 7.1 4.0 - 11.0 10e9/L    RBC Count 4.88 3.8 - 5.2 10e12/L    Hemoglobin 14.6 11.7 - 15.7 g/dL    Hematocrit 43.5 35.0 - 47.0 %    MCV 89 78 - 100 fl    MCH 29.9 26.5 - 33.0 pg    MCHC 33.6 31.5 - 36.5 g/dL    RDW 12.9 10.0 - 15.0 %    Platelet Count 214 150 - 450 10e9/L        ASSESSMENT/PLAN:   Routine general medical examination at a health care facility    Hypothyroidism, unspecified type  Controlled - continue medication.   - levothyroxine (SYNTHROID/LEVOTHROID) 137 MCG tablet  Dispense: 90 tablet; Refill: 1  - TSH with free T4 reflex  - Comprehensive metabolic panel (BMP + Alb, Alk Phos, ALT, AST, Total. Bili, TP)    Hypertension goal BP (blood pressure) < 130/80  Controlled - continue  "medication.   - Albumin Random Urine Quantitative with Creat Ratio  - lisinopril (ZESTRIL) 10 MG tablet  Dispense: 90 tablet; Refill: 3  - Comprehensive metabolic panel (BMP + Alb, Alk Phos, ALT, AST, Total. Bili, TP)  - CBC with platelets    Migraine without status migrainosus, not intractable, unspecified migraine type  Stable - continue medication(s)   - NIFEdipine ER OSMOTIC (PROCARDIA XL) 60 MG 24 hr tablet  Dispense: 90 tablet; Refill: 3    Anxiety  Stable - continue medication(s)   - escitalopram (LEXAPRO) 20 MG tablet  Dispense: 90 tablet; Refill: 1    Screening for hyperlipidemia  - Lipid panel reflex to direct LDL Fasting    Arthritis of right hand  That is helpful and will continue, caution regarding ulcers and renal issues and encouraged staying well-hydrated and taking with food.  - meloxicam (MOBIC) 15 MG tablet  Dispense: 90 tablet; Refill: 3        COUNSELING:   Reviewed preventive health counseling, as reflected in patient instructions    Estimated body mass index is 37.93 kg/m  as calculated from the following:    Height as of this encounter: 1.676 m (5' 6\").    Weight as of this encounter: 106.6 kg (235 lb).  Weight management plan: Discussed healthy diet and exercise guidelines     reports that she has never smoked. She has never used smokeless tobacco.      Return for Wellness Exam and fasting labs, in person, with Dr Sonny Conde.         Sonny Conde MD     [address] [address]  [email or web address]   Office: [phone]  Pager: [phone]     " No

## 2025-03-01 ENCOUNTER — HEALTH MAINTENANCE LETTER (OUTPATIENT)
Age: 55
End: 2025-03-01

## 2025-03-10 DIAGNOSIS — N39.41 URGENCY INCONTINENCE: ICD-10-CM

## 2025-03-10 RX ORDER — TOLTERODINE 2 MG/1
2 CAPSULE, EXTENDED RELEASE ORAL DAILY
Qty: 90 CAPSULE | Refills: 0 | Status: SHIPPED | OUTPATIENT
Start: 2025-03-10

## 2025-03-25 ENCOUNTER — PATIENT OUTREACH (OUTPATIENT)
Dept: CARE COORDINATION | Facility: CLINIC | Age: 55
End: 2025-03-25
Payer: COMMERCIAL

## 2025-04-18 ENCOUNTER — ANCILLARY PROCEDURE (OUTPATIENT)
Dept: MAMMOGRAPHY | Facility: CLINIC | Age: 55
End: 2025-04-18
Payer: COMMERCIAL

## 2025-04-18 DIAGNOSIS — Z12.31 VISIT FOR SCREENING MAMMOGRAM: ICD-10-CM

## 2025-04-18 PROCEDURE — 77063 BREAST TOMOSYNTHESIS BI: CPT | Mod: TC | Performed by: RADIOLOGY

## 2025-04-18 PROCEDURE — 77067 SCR MAMMO BI INCL CAD: CPT | Mod: TC | Performed by: RADIOLOGY

## 2025-04-19 ENCOUNTER — HEALTH MAINTENANCE LETTER (OUTPATIENT)
Age: 55
End: 2025-04-19

## 2025-04-21 ENCOUNTER — TELEPHONE (OUTPATIENT)
Dept: FAMILY MEDICINE | Facility: CLINIC | Age: 55
End: 2025-04-21

## 2025-04-21 ENCOUNTER — OFFICE VISIT (OUTPATIENT)
Dept: FAMILY MEDICINE | Facility: CLINIC | Age: 55
End: 2025-04-21
Attending: FAMILY MEDICINE
Payer: COMMERCIAL

## 2025-04-21 VITALS
HEART RATE: 83 BPM | BODY MASS INDEX: 34.23 KG/M2 | WEIGHT: 213 LBS | DIASTOLIC BLOOD PRESSURE: 84 MMHG | HEIGHT: 66 IN | OXYGEN SATURATION: 99 % | RESPIRATION RATE: 16 BRPM | SYSTOLIC BLOOD PRESSURE: 134 MMHG | TEMPERATURE: 97.6 F

## 2025-04-21 DIAGNOSIS — H10.13 ALLERGIC CONJUNCTIVITIS, BILATERAL: ICD-10-CM

## 2025-04-21 DIAGNOSIS — I10 HYPERTENSION GOAL BP (BLOOD PRESSURE) < 130/80: Primary | ICD-10-CM

## 2025-04-21 DIAGNOSIS — E66.01 SEVERE OBESITY (BMI 35.0-39.9) WITH COMORBIDITY (H): ICD-10-CM

## 2025-04-21 DIAGNOSIS — N39.41 URGENCY INCONTINENCE: ICD-10-CM

## 2025-04-21 DIAGNOSIS — M19.041 ARTHRITIS OF RIGHT HAND: ICD-10-CM

## 2025-04-21 DIAGNOSIS — Z13.6 SCREENING FOR CARDIOVASCULAR CONDITION: ICD-10-CM

## 2025-04-21 DIAGNOSIS — E03.9 HYPOTHYROIDISM, UNSPECIFIED TYPE: ICD-10-CM

## 2025-04-21 DIAGNOSIS — F41.9 ANXIETY: ICD-10-CM

## 2025-04-21 LAB
ERYTHROCYTE [DISTWIDTH] IN BLOOD BY AUTOMATED COUNT: 12.5 % (ref 10–15)
HCT VFR BLD AUTO: 41.6 % (ref 35–47)
HGB BLD-MCNC: 14.3 G/DL (ref 11.7–15.7)
MCH RBC QN AUTO: 29.5 PG (ref 26.5–33)
MCHC RBC AUTO-ENTMCNC: 34.4 G/DL (ref 31.5–36.5)
MCV RBC AUTO: 86 FL (ref 78–100)
PLATELET # BLD AUTO: 194 10E3/UL (ref 150–450)
RBC # BLD AUTO: 4.85 10E6/UL (ref 3.8–5.2)
WBC # BLD AUTO: 5.7 10E3/UL (ref 4–11)

## 2025-04-21 PROCEDURE — 80053 COMPREHEN METABOLIC PANEL: CPT | Performed by: FAMILY MEDICINE

## 2025-04-21 PROCEDURE — 85027 COMPLETE CBC AUTOMATED: CPT | Performed by: FAMILY MEDICINE

## 2025-04-21 PROCEDURE — 99214 OFFICE O/P EST MOD 30 MIN: CPT | Performed by: FAMILY MEDICINE

## 2025-04-21 PROCEDURE — 82570 ASSAY OF URINE CREATININE: CPT | Performed by: FAMILY MEDICINE

## 2025-04-21 PROCEDURE — 82043 UR ALBUMIN QUANTITATIVE: CPT | Performed by: FAMILY MEDICINE

## 2025-04-21 PROCEDURE — 3079F DIAST BP 80-89 MM HG: CPT | Performed by: FAMILY MEDICINE

## 2025-04-21 PROCEDURE — 3075F SYST BP GE 130 - 139MM HG: CPT | Performed by: FAMILY MEDICINE

## 2025-04-21 PROCEDURE — G2211 COMPLEX E/M VISIT ADD ON: HCPCS | Performed by: FAMILY MEDICINE

## 2025-04-21 PROCEDURE — 36415 COLL VENOUS BLD VENIPUNCTURE: CPT | Performed by: FAMILY MEDICINE

## 2025-04-21 PROCEDURE — 80061 LIPID PANEL: CPT | Performed by: FAMILY MEDICINE

## 2025-04-21 RX ORDER — LISINOPRIL 10 MG/1
10 TABLET ORAL DAILY
Qty: 90 TABLET | Refills: 4 | Status: SHIPPED | OUTPATIENT
Start: 2025-04-21

## 2025-04-21 RX ORDER — OLOPATADINE HYDROCHLORIDE 1 MG/ML
1 SOLUTION/ DROPS OPHTHALMIC 2 TIMES DAILY
Qty: 5 ML | Refills: 4 | Status: SHIPPED | OUTPATIENT
Start: 2025-04-21

## 2025-04-21 RX ORDER — LEVOTHYROXINE SODIUM 100 UG/1
100 TABLET ORAL DAILY
Qty: 90 TABLET | Refills: 4 | Status: SHIPPED | OUTPATIENT
Start: 2025-04-21

## 2025-04-21 RX ORDER — MELOXICAM 15 MG/1
15 TABLET ORAL DAILY
Qty: 90 TABLET | Refills: 4 | Status: SHIPPED | OUTPATIENT
Start: 2025-04-21

## 2025-04-21 RX ORDER — ESCITALOPRAM OXALATE 20 MG/1
20 TABLET ORAL DAILY
Qty: 90 TABLET | Refills: 4 | Status: SHIPPED | OUTPATIENT
Start: 2025-04-21

## 2025-04-21 RX ORDER — TOLTERODINE 2 MG/1
2 CAPSULE, EXTENDED RELEASE ORAL DAILY
Qty: 90 CAPSULE | Refills: 0 | Status: SHIPPED | OUTPATIENT
Start: 2025-04-21

## 2025-04-21 ASSESSMENT — PATIENT HEALTH QUESTIONNAIRE - PHQ9
SUM OF ALL RESPONSES TO PHQ QUESTIONS 1-9: 6
SUM OF ALL RESPONSES TO PHQ QUESTIONS 1-9: 6
10. IF YOU CHECKED OFF ANY PROBLEMS, HOW DIFFICULT HAVE THESE PROBLEMS MADE IT FOR YOU TO DO YOUR WORK, TAKE CARE OF THINGS AT HOME, OR GET ALONG WITH OTHER PEOPLE: SOMEWHAT DIFFICULT

## 2025-04-21 NOTE — TELEPHONE ENCOUNTER
Cha from [facility] in [place] calling to report, this pharmacy does not do compounded prescriptions.     Semglutide compounding rx would have to go elsewhere.     Cha did advise of [facility] in [place], [place] that compounds. Pharmacy desired attached if PCP would like to send there.

## 2025-04-21 NOTE — PROGRESS NOTES
Assessment & Plan     Hypertension goal BP (blood pressure) < 130/80  Controlled - continue medication(s).  - lisinopril (ZESTRIL) 10 MG tablet  Dispense: 90 tablet; Refill: 4  - COMPREHENSIVE METABOLIC PANEL  - Albumin Random Urine Quantitative with Creat Ratio  - CBC with Platelets  - COMPREHENSIVE METABOLIC PANEL  - Albumin Random Urine Quantitative with Creat Ratio  - CBC with Platelets    Urgency incontinence  Controlled - continue medication(s).  - tolterodine ER (DETROL LA) 2 MG 24 hr capsule  Dispense: 90 capsule; Refill: 0    Allergic conjunctivitis, bilateral  Seasonal and sent a prescription to fill later in the year when needed:  - olopatadine (PATANOL) 0.1 % ophthalmic solution  Dispense: 5 mL; Refill: 4    Hypothyroidism, unspecified type  Controlled - continue medication(s).  - levothyroxine (SYNTHROID/LEVOTHROID) 100 MCG tablet  Dispense: 90 tablet; Refill: 4    Anxiety  - Discuss potential increase in escitalopram dosage, but she opts to try managing without increasing the dose.  - Engage in non-pharmacological interventions such as volunteering or engaging in community activities.  - Risks and side effects: Potential risk of heart rhythm issues with higher doses of escitalopram.  - escitalopram (LEXAPRO) 20 MG tablet  Dispense: 90 tablet; Refill: 4    Arthritis of right hand  Stable - continue medication(s).  - meloxicam (MOBIC) 15 MG tablet  Dispense: 90 tablet; Refill: 4    Severe obesity (BMI 35.0-39.9) with comorbidity (H)   Current semaglutide dose may be insufficient as sugar cravings have returned.  - Increase semaglutide dose to 1.7 mg weekly.  - COMPOUNDED NON-CONTROLLED SUBSTANCE (CMPD RX) - PHARMACY TO MIX COMPOUNDED MEDICATION  Dispense: 1.36 mL; Refill: 11    Screening for cardiovascular condition  - Lipid panel reflex to direct LDL Non-fasting  - Lipid panel reflex to direct LDL Non-fasting      Consent was obtained from the patient to use an AI documentation tool in the creation of  this note.    Return in about 1 year (around 4/21/2026) for medication recheck.           Sonny Conde MD     [address] [address]  [facility]     Office: [phone]         Shanae Schreiber is a 55 year old, presenting for the following health issues:  Recheck Medication        4/21/2025     9:54 AM   Additional Questions   Roomed by Cathi JESUS CMA     Via the Health Maintenance questionnaire, the patient has reported the following services have been completed -Mammogram: [facility] 2025-04-18, this information has not been sent to the abstraction team.  History of Present Illness       Reason for visit:  Med refills    She eats 2-3 servings of fruits and vegetables daily.She consumes 0 sweetened beverage(s) daily.She exercises with enough effort to increase her heart rate 10 to 19 minutes per day.  She exercises with enough effort to increase her heart rate 4 days per week.   She is taking medications regularly.     She has a history of thyroid problems and has been dealing with fatigue since then. A thyroid function test was done on December 13, 2024, with a TSH level of 0.45. She has been diagnosed with Hashimoto's disease for about 10 years.    She is experiencing a lack of purpose and difficulty getting out of bed, especially since her third child moved out two months ago. She feels fine once she gets going but struggles with motivation in the mornings. She has been taking escitalopram but is unsure about its effectiveness.    She has been on semaglutide for about a year. Initially lost weight, going from 228 pounds to 202 pounds, possibly with the help of a keto diet plan. She is concerned about the long-term effects of semaglutide as she has gained some weight back.    Her third child moved out two months ago, and her last child, Garrick, is graduating in May. She feels a lack of purpose and is considering starting a small  business or volunteering.    She takes lisinopril for blood pressure and meloxicam for arthritis. Meloxicam helps when arthritis flares up. She is concerned about the long-term effects of semaglutide and its impact on weight management.    Her marriage is going fine, and she has a good relationship with her daughter-in-law. She helps watch her grandchild about once a week.    Medication Followup of Weight Management on Semaglutide/cyanocobalamin 5mg/0.5mg/ml - inject 0.2ml (1mg/0.1mg) subcutaneaous weekly   Taking Medication as prescribed: yes  Side Effects:  None  Medication Helping Symptoms:  does not feel it is working as well as I was.     Hypertension Follow-up    Do you check your blood pressure regularly outside of the clinic? No   Are you following a low salt diet? Yes  Are your blood pressures ever more than 140 on the top number (systolic) OR more   than 90 on the bottom number (diastolic), for example 140/90? N/A    BP Readings from Last 2 Encounters:   04/21/25 134/84   04/18/25 (!) 140/82     Anxiety   How are you doing with your anxiety since your last visit? No change  Are you having other symptoms that might be associated with anxiety? No  Have you had a significant life event? OTHER: children leaving the house.     Are you feeling depressed? No  Do you have any concerns with your use of alcohol or other drugs? No    Social History     Tobacco Use    Smoking status: Never    Smokeless tobacco: Never   Vaping Use    Vaping status: Never Used   Substance Use Topics    Alcohol use: Yes     Alcohol/week: 0.0 standard drinks of alcohol     Comment: Socially    Drug use: No         1/16/2024     2:45 PM 3/1/2024    12:33 PM 4/18/2025     2:04 PM   JAYDE-7 SCORE   Total Score  0 (minimal anxiety)    Total Score 0 0 0         3/1/2024    12:32 PM 4/18/2025     2:04 PM 4/21/2025     9:43 AM   PHQ   PHQ-9 Total Score 3 10 6    Q9: Thoughts of better off dead/self-harm past 2 weeks Not at all Not at all Not at  "all       Patient-reported         4/21/2025     9:43 AM   Last PHQ-9   1.  Little interest or pleasure in doing things 1   2.  Feeling down, depressed, or hopeless 0   3.  Trouble falling or staying asleep, or sleeping too much 2   4.  Feeling tired or having little energy 1   5.  Poor appetite or overeating 1   6.  Feeling bad about yourself 0   7.  Trouble concentrating 1   8.  Moving slowly or restless 0   Q9: Thoughts of better off dead/self-harm past 2 weeks 0   PHQ-9 Total Score 6        Patient-reported         4/18/2025     2:04 PM   JAYDE-7    1. Feeling nervous, anxious, or on edge 0   2. Not being able to stop or control worrying 0   3. Worrying too much about different things 0   4. Trouble relaxing 0   5. Being so restless that it is hard to sit still 0   6. Becoming easily annoyed or irritable 0   7. Feeling afraid, as if something awful might happen 0   JAYDE-7 Total Score 0   If you checked any problems, how difficult have they made it for you to do your work, take care of things at home, or get along with other people? Not difficult at all       Hypothyroidism Follow-up    Since last visit, patient describes the following symptoms: Weight stable, no hair loss, no skin changes, no constipation, no loose stools          Objective    /84   Pulse 83   Temp 97.6  F (36.4  C) (Tympanic)   Resp 16   Ht 1.664 m (5' 5.5\")   Wt 96.6 kg (213 lb)   LMP 03/17/2014   SpO2 99%   BMI 34.91 kg/m    Body mass index is 34.91 kg/m .  Physical Exam   GENERAL: alert and no distress  EYES: Eyes grossly normal to inspection, PERRL and conjunctivae and sclerae normal  HENT: ear canals and TM's normal, nose and mouth without ulcers or lesions  NECK: no adenopathy, no asymmetry, masses, or scars  RESP: lungs clear to auscultation - no rales, rhonchi or wheezes  CV: regular rate and rhythm, normal S1 S2, no S3 or S4, no murmur, click or rub, no peripheral edema  ABDOMEN: soft, nontender, no hepatosplenomegaly, no " masses and bowel sounds normal  MS: no gross musculoskeletal defects noted, no edema  SKIN: no suspicious lesions or rashes  NEURO: Normal strength and tone, mentation intact and speech normal  BACK: no CVA tenderness, no paralumbar tenderness  PSYCH: mentation appears normal, affect normal/bright        The longitudinal plan of care for the diagnosis(es)/condition(s) as documented were addressed during this visit. Due to the added complexity in care, I will continue to support Candie in the subsequent management and with ongoing continuity of care.    Signed Electronically by: Prudencio Conde MD

## 2025-04-22 LAB
ALBUMIN SERPL BCG-MCNC: 4.1 G/DL (ref 3.5–5.2)
ALP SERPL-CCNC: 82 U/L (ref 40–150)
ALT SERPL W P-5'-P-CCNC: 19 U/L (ref 0–50)
ANION GAP SERPL CALCULATED.3IONS-SCNC: 8 MMOL/L (ref 7–15)
AST SERPL W P-5'-P-CCNC: 19 U/L (ref 0–45)
BILIRUB SERPL-MCNC: 0.5 MG/DL
BUN SERPL-MCNC: 17.3 MG/DL (ref 6–20)
CALCIUM SERPL-MCNC: 10.3 MG/DL (ref 8.8–10.4)
CHLORIDE SERPL-SCNC: 105 MMOL/L (ref 98–107)
CHOLEST SERPL-MCNC: 204 MG/DL
CREAT SERPL-MCNC: 0.77 MG/DL (ref 0.51–0.95)
CREAT UR-MCNC: 43.5 MG/DL
EGFRCR SERPLBLD CKD-EPI 2021: >90 ML/MIN/1.73M2
FASTING STATUS PATIENT QL REPORTED: YES
FASTING STATUS PATIENT QL REPORTED: YES
GLUCOSE SERPL-MCNC: 88 MG/DL (ref 70–99)
HCO3 SERPL-SCNC: 29 MMOL/L (ref 22–29)
HDLC SERPL-MCNC: 47 MG/DL
LDLC SERPL CALC-MCNC: 136 MG/DL
MICROALBUMIN UR-MCNC: <12 MG/L
MICROALBUMIN/CREAT UR: NORMAL MG/G{CREAT}
NONHDLC SERPL-MCNC: 157 MG/DL
POTASSIUM SERPL-SCNC: 4.6 MMOL/L (ref 3.4–5.3)
PROT SERPL-MCNC: 6.6 G/DL (ref 6.4–8.3)
SODIUM SERPL-SCNC: 142 MMOL/L (ref 135–145)
TRIGL SERPL-MCNC: 104 MG/DL

## 2025-04-22 NOTE — RESULT ENCOUNTER NOTE
Dearosio Schreiber,    Here is a summary of your recent test results:  -Normal red blood cell (hgb) levels, normal white blood cell count and normal platelet levels.  -Lipid panel: The ASCVD risk score returns the percentage likelihood of a first time Atherosclerotic Cardiovascular Disease (ASCVD) event.    The 10-year ASCVD risk score (Jacinta ADAMS, et al., 2019) is: 3.5%    Values used to calculate the score:      Age: 55 years      Sex: Female      Is Non- : No      Diabetic: No      Tobacco smoker: No      Systolic Blood Pressure: 134 mmHg      Is BP treated: Yes      HDL Cholesterol: 47 mg/dL      Total Cholesterol: 204 mg/dL    - 3.5% of patients that have a similar cholesterol profile to you will have a stroke, heart attack or death (related to heart disease) within the next 10 years and that is considered a low risk and cholesterol lowering medications are not  recommended at this time (high risk is >10%, or >7.5% if other risk factors such has high blood pressure or other heart disease risk factors).    -LDL(bad) cholesterol level is elevated, HDL(good) cholesterol level is low which can increase your heart disease risk.  A diet high in fat and simple carbohydrates, genetics and being overweight can contribute to this. ADVISE: exercising 150 minutes of aerobic exercise per week (30 minutes for 5 days per week or 50 minutes for 3 days per week are options) and eating a low saturated fat/low carbohydrate diet are helpful to improve this.  -Liver and gallbladder tests are normal (ALT,AST, Alk phos, bilirubin), kidney function is normal (Cr, GFR), sodium is normal, potassium is normal, calcium is normal, glucose is normal.  -Microalbumin (urine protein) test is normal.  ADVISE: rechecking this annually.    For additional lab test information, www.testing.com is a very good reference.    In addition, here is a list of due or overdue Health Maintenance reminders:  Pneumococcal Vaccine(1 of 1 - PCV)  Never done  Zoster (Shingles) Vaccine(1 of 2) Never done    Please call us at [phone] (or use [employer]) to address the above recommendations if needed.           Thank you very much for trusting me and [facility].     Have a peaceful day.    Healthy regards,  Sonny Conde MD

## 2025-04-24 ENCOUNTER — TELEPHONE (OUTPATIENT)
Dept: FAMILY MEDICINE | Facility: CLINIC | Age: 55
End: 2025-04-24
Payer: COMMERCIAL

## 2025-04-24 DIAGNOSIS — E66.01 SEVERE OBESITY (BMI 35.0-39.9) WITH COMORBIDITY (H): ICD-10-CM

## 2025-04-24 NOTE — TELEPHONE ENCOUNTER
S-(situation): pharmacy calls.     B-(background): n/a    A-(assessment): pharmacy saying they cannot dispense the 1.36 mL of semaglutide. They have 1 ml or 2.5 ml     R-(recommendations): Routing to provider to review and advise.     ADONIS NESBITT RN on 4/24/2025 at 4:24 PM   [facility]

## 2025-06-11 ENCOUNTER — TRANSFERRED RECORDS (OUTPATIENT)
Dept: HEALTH INFORMATION MANAGEMENT | Facility: CLINIC | Age: 55
End: 2025-06-11
Payer: COMMERCIAL

## 2025-06-26 ENCOUNTER — MYC MEDICAL ADVICE (OUTPATIENT)
Dept: FAMILY MEDICINE | Facility: CLINIC | Age: 55
End: 2025-06-26
Payer: COMMERCIAL

## (undated) DEVICE — SOL NACL 0.9% INJ 1000ML BAG 2B1324X

## (undated) DEVICE — SU FIBERWIRE 2-0 TAPER CUT AR-7220

## (undated) DEVICE — SOL NACL 0.9% IRRIG 1000ML BOTTLE 2F7124

## (undated) DEVICE — PREP DURAPREP 26ML APL 8630

## (undated) DEVICE — SOL WATER IRRIG 1000ML BOTTLE 2F7114

## (undated) DEVICE — BNDG ROLLER GAUZE CONFORM 4"X4YD 41-54

## (undated) DEVICE — DRAPE MINI C-ARM 4003

## (undated) DEVICE — EVAC SYSTEM CLEAR FLOW SC082500

## (undated) DEVICE — SU VICRYL 3-0 SH 27" J316H

## (undated) DEVICE — ENDO TROCAR FIRST ENTRY KII FIOS ADV FIX 05X100MM CFF03

## (undated) DEVICE — LINEN GOWN XLG 5407

## (undated) DEVICE — ESU HOLDER LAP INST DISP PURPLE LONG 330MM H-PRO-330

## (undated) DEVICE — SUCTION CANISTER MEDIVAC LINER 3000ML W/LID 65651-530

## (undated) DEVICE — GLOVE PROTEXIS W/NEU-THERA 7.5  2D73TE75

## (undated) DEVICE — GLOVE PROTEXIS W/NEU-THERA 6.5  2D73TE65

## (undated) DEVICE — SYR 10ML FINGER CONTROL W/O NDL 309695

## (undated) DEVICE — CATH TRAY FOLEY SURESTEP 16FR W/URNE MTR STLK LATEX A303316A

## (undated) DEVICE — NDL 27GA 1.25" 305136

## (undated) DEVICE — BNDG ELASTIC 6" DBL LENGTH UNSTERILE 6611-16

## (undated) DEVICE — LINEN TOWEL PACK X5 5464

## (undated) DEVICE — SUCTION IRR STRYKERFLOW II W/TIP 250-070-520

## (undated) DEVICE — DECANTER VIAL 2006S

## (undated) DEVICE — SU VICRYL 4-0 PS-2 18" UND J496H

## (undated) DEVICE — NDL 18GA 1.5" 305196

## (undated) DEVICE — BLADE KNIFE SURG 15 371115

## (undated) DEVICE — DRAPE SHEET REV FOLD 3/4 9349

## (undated) DEVICE — BLADE SAW OSCILLATING STRYK MED 9.0X25X0.38MM 2296-003-111

## (undated) DEVICE — DEVICE SUTURE GRASPER TROCAR CLOSURE 14GA PMITCSG

## (undated) DEVICE — DRSG KERLIX FLUFFS X5

## (undated) DEVICE — NDL 25GA 1.5" 305127

## (undated) DEVICE — ESU GROUND PAD UNIVERSAL W/O CORD

## (undated) DEVICE — ENDO TROCAR SLEEVE KII ADV FIXATION 05X100MM CFS02

## (undated) DEVICE — Device

## (undated) DEVICE — ESU CORD MONOPOLAR 10'  E0510

## (undated) DEVICE — CAST PADDING 4" UNSTERILE 9044

## (undated) DEVICE — IMM LIMB ELEVATOR DC40-0203

## (undated) DEVICE — NDL INSUFFLATION 13GA 120MM C2201

## (undated) DEVICE — CAST PADDING 4" STERILE 9044S

## (undated) DEVICE — ENDO TROCAR FIRST ENTRY KII FIOS Z-THRD 11X100MM CTF33

## (undated) DEVICE — BLADE CLIPPER 4406

## (undated) DEVICE — ESU HANDPIECE BIPOLAR THUNDERBEAT FC 5MMX35CM TB-0535FC

## (undated) DEVICE — BNDG ELASTIC 4"X5YDS UNSTERILE 6611-40

## (undated) DEVICE — ENDO POUCH UNIVERSAL RETRIEVAL SYSTEM INZII 5MM CD003

## (undated) DEVICE — GLOVE PROTEXIS W/NEU-THERA 6.0  2D73TE60

## (undated) DEVICE — ENDO SCOPE WARMER LF TM500

## (undated) DEVICE — SU VICRYL 5-0 PS-3 18" UND J500G

## (undated) DEVICE — PREP CHLORAPREP 26ML TINTED HI-LITE ORANGE 930815

## (undated) DEVICE — BNDG ELASTIC 4" DBL LENGTH UNSTERILE 6611-14

## (undated) DEVICE — ESU GROUND PAD ADULT W/CORD E7507

## (undated) DEVICE — PACK EXTREMITY SOP15EXFSD

## (undated) DEVICE — SU VICRYL 0 UR-6 27" J603H

## (undated) RX ORDER — PROPOFOL 10 MG/ML
INJECTION, EMULSION INTRAVENOUS
Status: DISPENSED
Start: 2022-12-28

## (undated) RX ORDER — DEXAMETHASONE SODIUM PHOSPHATE 4 MG/ML
INJECTION, SOLUTION INTRA-ARTICULAR; INTRALESIONAL; INTRAMUSCULAR; INTRAVENOUS; SOFT TISSUE
Status: DISPENSED
Start: 2020-07-23

## (undated) RX ORDER — FENTANYL CITRATE 50 UG/ML
INJECTION, SOLUTION INTRAMUSCULAR; INTRAVENOUS
Status: DISPENSED
Start: 2020-07-23

## (undated) RX ORDER — DEXAMETHASONE SODIUM PHOSPHATE 4 MG/ML
INJECTION, SOLUTION INTRA-ARTICULAR; INTRALESIONAL; INTRAMUSCULAR; INTRAVENOUS; SOFT TISSUE
Status: DISPENSED
Start: 2022-12-28

## (undated) RX ORDER — CEFAZOLIN SODIUM 2 G/100ML
INJECTION, SOLUTION INTRAVENOUS
Status: DISPENSED
Start: 2020-07-23

## (undated) RX ORDER — ONDANSETRON 2 MG/ML
INJECTION INTRAMUSCULAR; INTRAVENOUS
Status: DISPENSED
Start: 2020-07-23

## (undated) RX ORDER — HYDROCODONE BITARTRATE AND ACETAMINOPHEN 5; 325 MG/1; MG/1
TABLET ORAL
Status: DISPENSED
Start: 2020-07-23

## (undated) RX ORDER — HYDROMORPHONE HYDROCHLORIDE 1 MG/ML
INJECTION, SOLUTION INTRAMUSCULAR; INTRAVENOUS; SUBCUTANEOUS
Status: DISPENSED
Start: 2020-07-23

## (undated) RX ORDER — EPHEDRINE SULFATE 50 MG/ML
INJECTION, SOLUTION INTRAMUSCULAR; INTRAVENOUS; SUBCUTANEOUS
Status: DISPENSED
Start: 2022-12-28

## (undated) RX ORDER — FENTANYL CITRATE 0.05 MG/ML
INJECTION, SOLUTION INTRAMUSCULAR; INTRAVENOUS
Status: DISPENSED
Start: 2022-12-28

## (undated) RX ORDER — PROPOFOL 10 MG/ML
INJECTION, EMULSION INTRAVENOUS
Status: DISPENSED
Start: 2020-07-23

## (undated) RX ORDER — GLYCOPYRROLATE 0.2 MG/ML
INJECTION, SOLUTION INTRAMUSCULAR; INTRAVENOUS
Status: DISPENSED
Start: 2020-07-23

## (undated) RX ORDER — LIDOCAINE HYDROCHLORIDE 20 MG/ML
INJECTION, SOLUTION EPIDURAL; INFILTRATION; INTRACAUDAL; PERINEURAL
Status: DISPENSED
Start: 2020-07-23

## (undated) RX ORDER — FENTANYL CITRATE 0.05 MG/ML
INJECTION, SOLUTION INTRAMUSCULAR; INTRAVENOUS
Status: DISPENSED
Start: 2020-07-23

## (undated) RX ORDER — ACETAMINOPHEN 325 MG/1
TABLET ORAL
Status: DISPENSED
Start: 2020-07-23

## (undated) RX ORDER — ONDANSETRON 2 MG/ML
INJECTION INTRAMUSCULAR; INTRAVENOUS
Status: DISPENSED
Start: 2022-12-28

## (undated) RX ORDER — FENTANYL CITRATE 50 UG/ML
INJECTION, SOLUTION INTRAMUSCULAR; INTRAVENOUS
Status: DISPENSED
Start: 2022-12-28

## (undated) RX ORDER — NEOSTIGMINE METHYLSULFATE 1 MG/ML
VIAL (ML) INJECTION
Status: DISPENSED
Start: 2020-07-23

## (undated) RX ORDER — BUPIVACAINE HYDROCHLORIDE AND EPINEPHRINE 5; 5 MG/ML; UG/ML
INJECTION, SOLUTION EPIDURAL; INTRACAUDAL; PERINEURAL
Status: DISPENSED
Start: 2020-07-23